# Patient Record
Sex: MALE | Race: WHITE | Employment: OTHER | URBAN - METROPOLITAN AREA
[De-identification: names, ages, dates, MRNs, and addresses within clinical notes are randomized per-mention and may not be internally consistent; named-entity substitution may affect disease eponyms.]

---

## 2021-01-01 ENCOUNTER — HOSPITAL ENCOUNTER (OUTPATIENT)
Dept: INFUSION THERAPY | Age: 76
Discharge: HOME | End: 2021-11-12
Payer: COMMERCIAL

## 2021-01-01 ENCOUNTER — TELEPHONE (OUTPATIENT)
Dept: ONCOLOGY | Age: 76
End: 2021-01-01

## 2021-01-01 ENCOUNTER — HOSPITAL ENCOUNTER (INPATIENT)
Age: 76
LOS: 1 days | DRG: 853 | End: 2021-12-14
Attending: EMERGENCY MEDICINE | Admitting: FAMILY MEDICINE
Payer: COMMERCIAL

## 2021-01-01 ENCOUNTER — APPOINTMENT (OUTPATIENT)
Dept: GENERAL RADIOLOGY | Age: 76
DRG: 853 | End: 2021-01-01
Payer: COMMERCIAL

## 2021-01-01 ENCOUNTER — HOSPITAL ENCOUNTER (OUTPATIENT)
Dept: MRI IMAGING | Age: 76
Discharge: HOME | End: 2021-06-10
Payer: COMMERCIAL

## 2021-01-01 ENCOUNTER — HOSPITAL ENCOUNTER (OUTPATIENT)
Dept: INFUSION THERAPY | Age: 76
Discharge: HOME | End: 2021-10-29
Payer: COMMERCIAL

## 2021-01-01 ENCOUNTER — OFFICE VISIT (OUTPATIENT)
Dept: ONCOLOGY | Age: 76
End: 2021-01-01
Payer: COMMERCIAL

## 2021-01-01 ENCOUNTER — HOSPITAL ENCOUNTER (OUTPATIENT)
Dept: RADIATION ONCOLOGY | Age: 76
Discharge: HOME | End: 2021-09-01
Payer: COMMERCIAL

## 2021-01-01 ENCOUNTER — TELEPHONE (OUTPATIENT)
Dept: INFUSION THERAPY | Age: 76
End: 2021-01-01

## 2021-01-01 ENCOUNTER — HOSPITAL ENCOUNTER (OUTPATIENT)
Dept: GENERAL RADIOLOGY | Age: 76
Discharge: HOME | End: 2021-08-17
Payer: COMMERCIAL

## 2021-01-01 ENCOUNTER — HOSPITAL ENCOUNTER (OUTPATIENT)
Dept: INFUSION THERAPY | Age: 76
Discharge: HOME | End: 2021-12-03
Payer: COMMERCIAL

## 2021-01-01 ENCOUNTER — OFFICE VISIT (OUTPATIENT)
Dept: SURGERY | Age: 76
End: 2021-01-01
Payer: COMMERCIAL

## 2021-01-01 ENCOUNTER — ANESTHESIA (OUTPATIENT)
Dept: OPERATING ROOM | Age: 76
DRG: 853 | End: 2021-01-01
Payer: COMMERCIAL

## 2021-01-01 ENCOUNTER — HOSPITAL ENCOUNTER (OUTPATIENT)
Dept: INFUSION THERAPY | Age: 76
Discharge: HOME | End: 2021-08-04
Payer: COMMERCIAL

## 2021-01-01 ENCOUNTER — CLINICAL DOCUMENTATION (OUTPATIENT)
Dept: INFUSION THERAPY | Age: 76
End: 2021-01-01

## 2021-01-01 ENCOUNTER — CLINICAL DOCUMENTATION (OUTPATIENT)
Dept: ONCOLOGY | Age: 76
End: 2021-01-01

## 2021-01-01 ENCOUNTER — HOSPITAL ENCOUNTER (OUTPATIENT)
Dept: INFUSION THERAPY | Age: 76
Discharge: HOME | End: 2021-12-07
Payer: COMMERCIAL

## 2021-01-01 ENCOUNTER — HOSPITAL ENCOUNTER (OUTPATIENT)
Dept: CT IMAGING | Age: 76
Discharge: HOME | End: 2021-08-17
Payer: COMMERCIAL

## 2021-01-01 ENCOUNTER — HOSPITAL ENCOUNTER (OUTPATIENT)
Dept: INFUSION THERAPY | Age: 76
Discharge: HOME | End: 2021-10-22
Payer: COMMERCIAL

## 2021-01-01 ENCOUNTER — APPOINTMENT (OUTPATIENT)
Dept: RADIATION ONCOLOGY | Age: 76
End: 2021-01-01
Attending: RADIOLOGY
Payer: COMMERCIAL

## 2021-01-01 ENCOUNTER — INITIAL CONSULT (OUTPATIENT)
Dept: ONCOLOGY | Age: 76
End: 2021-01-01
Payer: COMMERCIAL

## 2021-01-01 ENCOUNTER — HOSPITAL ENCOUNTER (INPATIENT)
Age: 76
LOS: 1 days | Discharge: HOME | DRG: 386 | End: 2021-11-29
Attending: STUDENT IN AN ORGANIZED HEALTH CARE EDUCATION/TRAINING PROGRAM | Admitting: INTERNAL MEDICINE
Payer: COMMERCIAL

## 2021-01-01 ENCOUNTER — HOSPITAL ENCOUNTER (OUTPATIENT)
Dept: INFUSION THERAPY | Age: 76
Discharge: HOME | End: 2021-10-01
Payer: COMMERCIAL

## 2021-01-01 ENCOUNTER — HOSPITAL ENCOUNTER (OUTPATIENT)
Dept: PET IMAGING | Age: 76
Discharge: HOME | End: 2021-08-02
Payer: COMMERCIAL

## 2021-01-01 ENCOUNTER — HOSPITAL ENCOUNTER (OUTPATIENT)
Dept: INFUSION THERAPY | Age: 76
Discharge: HOME | End: 2021-09-22
Payer: COMMERCIAL

## 2021-01-01 ENCOUNTER — HOSPITAL ENCOUNTER (OUTPATIENT)
Dept: INFUSION THERAPY | Age: 76
Discharge: HOME | End: 2021-09-17
Payer: COMMERCIAL

## 2021-01-01 ENCOUNTER — HOSPITAL ENCOUNTER (OUTPATIENT)
Age: 76
Setting detail: SPECIMEN
Discharge: HOME | End: 2021-05-12
Payer: COMMERCIAL

## 2021-01-01 ENCOUNTER — HOSPITAL ENCOUNTER (OUTPATIENT)
Dept: RADIATION ONCOLOGY | Age: 76
Discharge: HOME | End: 2021-09-15
Attending: RADIOLOGY
Payer: COMMERCIAL

## 2021-01-01 ENCOUNTER — ANESTHESIA EVENT (OUTPATIENT)
Dept: OPERATING ROOM | Age: 76
DRG: 853 | End: 2021-01-01
Payer: COMMERCIAL

## 2021-01-01 ENCOUNTER — HOSPITAL ENCOUNTER (OUTPATIENT)
Dept: RADIATION ONCOLOGY | Age: 76
Discharge: HOME | End: 2021-10-06
Attending: RADIOLOGY
Payer: COMMERCIAL

## 2021-01-01 ENCOUNTER — HOSPITAL ENCOUNTER (OUTPATIENT)
Dept: GENERAL RADIOLOGY | Age: 76
Discharge: HOME | End: 2021-12-03
Payer: COMMERCIAL

## 2021-01-01 ENCOUNTER — TELEPHONE (OUTPATIENT)
Dept: SURGERY | Age: 76
End: 2021-01-01

## 2021-01-01 ENCOUNTER — HOSPITAL ENCOUNTER (OUTPATIENT)
Dept: INFUSION THERAPY | Age: 76
Discharge: HOME | End: 2021-09-20
Payer: COMMERCIAL

## 2021-01-01 ENCOUNTER — APPOINTMENT (OUTPATIENT)
Dept: CT IMAGING | Age: 76
DRG: 386 | End: 2021-01-01
Payer: COMMERCIAL

## 2021-01-01 ENCOUNTER — HOSPITAL ENCOUNTER (OUTPATIENT)
Dept: INFUSION THERAPY | Age: 76
Discharge: HOME | End: 2021-12-01
Payer: COMMERCIAL

## 2021-01-01 ENCOUNTER — HOSPITAL ENCOUNTER (OUTPATIENT)
Dept: INFUSION THERAPY | Age: 76
Discharge: HOME | End: 2021-09-07
Payer: COMMERCIAL

## 2021-01-01 ENCOUNTER — HOSPITAL ENCOUNTER (OUTPATIENT)
Age: 76
Discharge: HOME | End: 2021-12-03
Payer: COMMERCIAL

## 2021-01-01 ENCOUNTER — APPOINTMENT (OUTPATIENT)
Dept: CT IMAGING | Age: 76
DRG: 853 | End: 2021-01-01
Payer: COMMERCIAL

## 2021-01-01 ENCOUNTER — APPOINTMENT (OUTPATIENT)
Dept: INFUSION THERAPY | Age: 76
End: 2021-01-01
Payer: COMMERCIAL

## 2021-01-01 ENCOUNTER — HOSPITAL ENCOUNTER (OUTPATIENT)
Dept: INFUSION THERAPY | Age: 76
Discharge: HOME | End: 2021-07-21
Payer: COMMERCIAL

## 2021-01-01 ENCOUNTER — HOSPITAL ENCOUNTER (OUTPATIENT)
Dept: RADIATION ONCOLOGY | Age: 76
Discharge: HOME | End: 2021-09-21
Attending: RADIOLOGY
Payer: COMMERCIAL

## 2021-01-01 ENCOUNTER — HOSPITAL ENCOUNTER (OUTPATIENT)
Dept: MRI IMAGING | Age: 76
Discharge: HOME | End: 2021-06-18
Payer: COMMERCIAL

## 2021-01-01 ENCOUNTER — NURSE ONLY (OUTPATIENT)
Dept: ONCOLOGY | Age: 76
End: 2021-01-01

## 2021-01-01 ENCOUNTER — HOSPITAL ENCOUNTER (OUTPATIENT)
Dept: INFUSION THERAPY | Age: 76
Discharge: HOME | End: 2021-08-20
Payer: COMMERCIAL

## 2021-01-01 ENCOUNTER — HOSPITAL ENCOUNTER (OUTPATIENT)
Dept: CT IMAGING | Age: 76
Discharge: HOME | End: 2021-06-10
Payer: COMMERCIAL

## 2021-01-01 ENCOUNTER — HOSPITAL ENCOUNTER (OUTPATIENT)
Dept: RADIATION ONCOLOGY | Age: 76
Discharge: HOME | End: 2021-10-08
Attending: RADIOLOGY
Payer: COMMERCIAL

## 2021-01-01 ENCOUNTER — HOSPITAL ENCOUNTER (OUTPATIENT)
Dept: RADIATION ONCOLOGY | Age: 76
Discharge: HOME | End: 2021-07-22
Payer: COMMERCIAL

## 2021-01-01 ENCOUNTER — HOSPITAL ENCOUNTER (OUTPATIENT)
Dept: INFUSION THERAPY | Age: 76
Discharge: HOME | End: 2021-11-01
Payer: COMMERCIAL

## 2021-01-01 ENCOUNTER — HOSPITAL ENCOUNTER (OUTPATIENT)
Dept: INTERVENTIONAL RADIOLOGY/VASCULAR | Age: 76
Discharge: HOME | End: 2021-08-10
Payer: COMMERCIAL

## 2021-01-01 ENCOUNTER — HOSPITAL ENCOUNTER (OUTPATIENT)
Dept: CT IMAGING | Age: 76
Discharge: HOME | End: 2021-05-25
Payer: COMMERCIAL

## 2021-01-01 ENCOUNTER — HOSPITAL ENCOUNTER (OUTPATIENT)
Dept: INFUSION THERAPY | Age: 76
Discharge: HOME | End: 2021-06-24
Payer: COMMERCIAL

## 2021-01-01 ENCOUNTER — HOSPITAL ENCOUNTER (OUTPATIENT)
Dept: RADIATION ONCOLOGY | Age: 76
Discharge: HOME | End: 2021-10-11
Attending: RADIOLOGY
Payer: COMMERCIAL

## 2021-01-01 ENCOUNTER — HOSPITAL ENCOUNTER (OUTPATIENT)
Dept: INFUSION THERAPY | Age: 76
Discharge: HOME | End: 2021-10-04
Payer: COMMERCIAL

## 2021-01-01 ENCOUNTER — CLINICAL DOCUMENTATION (OUTPATIENT)
Dept: RADIATION ONCOLOGY | Age: 76
End: 2021-01-01

## 2021-01-01 ENCOUNTER — HOSPITAL ENCOUNTER (OUTPATIENT)
Dept: RADIATION ONCOLOGY | Age: 76
Discharge: HOME | End: 2021-09-09
Attending: RADIOLOGY
Payer: COMMERCIAL

## 2021-01-01 ENCOUNTER — APPOINTMENT (OUTPATIENT)
Dept: GENERAL RADIOLOGY | Age: 76
DRG: 386 | End: 2021-01-01
Payer: COMMERCIAL

## 2021-01-01 ENCOUNTER — HOSPITAL ENCOUNTER (OUTPATIENT)
Dept: INFUSION THERAPY | Age: 76
Discharge: HOME | End: 2021-06-01
Payer: COMMERCIAL

## 2021-01-01 ENCOUNTER — HOSPITAL ENCOUNTER (OUTPATIENT)
Dept: INFUSION THERAPY | Age: 76
Discharge: HOME | End: 2021-11-03
Payer: COMMERCIAL

## 2021-01-01 VITALS
HEART RATE: 70 BPM | HEIGHT: 69 IN | WEIGHT: 195.4 LBS | BODY MASS INDEX: 28.94 KG/M2 | DIASTOLIC BLOOD PRESSURE: 58 MMHG | OXYGEN SATURATION: 98 % | SYSTOLIC BLOOD PRESSURE: 118 MMHG | RESPIRATION RATE: 16 BRPM | TEMPERATURE: 96.9 F

## 2021-01-01 VITALS
DIASTOLIC BLOOD PRESSURE: 54 MMHG | SYSTOLIC BLOOD PRESSURE: 90 MMHG | OXYGEN SATURATION: 94 % | OXYGEN SATURATION: 99 % | HEIGHT: 71 IN | WEIGHT: 200.8 LBS | DIASTOLIC BLOOD PRESSURE: 50 MMHG | HEART RATE: 64 BPM | BODY MASS INDEX: 28.11 KG/M2 | SYSTOLIC BLOOD PRESSURE: 98 MMHG | TEMPERATURE: 96.3 F | RESPIRATION RATE: 14 BRPM | TEMPERATURE: 95.4 F

## 2021-01-01 VITALS — HEIGHT: 70 IN | BODY MASS INDEX: 30.21 KG/M2 | WEIGHT: 211 LBS

## 2021-01-01 VITALS
HEIGHT: 63 IN | DIASTOLIC BLOOD PRESSURE: 51 MMHG | SYSTOLIC BLOOD PRESSURE: 91 MMHG | BODY MASS INDEX: 33.66 KG/M2 | TEMPERATURE: 96.7 F | RESPIRATION RATE: 12 BRPM | HEART RATE: 76 BPM | OXYGEN SATURATION: 98 % | WEIGHT: 190 LBS

## 2021-01-01 VITALS
TEMPERATURE: 97.3 F | DIASTOLIC BLOOD PRESSURE: 51 MMHG | HEART RATE: 73 BPM | OXYGEN SATURATION: 96 % | HEIGHT: 71 IN | BODY MASS INDEX: 28.53 KG/M2 | SYSTOLIC BLOOD PRESSURE: 86 MMHG | WEIGHT: 203.8 LBS | RESPIRATION RATE: 16 BRPM

## 2021-01-01 VITALS
BODY MASS INDEX: 27.3 KG/M2 | DIASTOLIC BLOOD PRESSURE: 62 MMHG | HEART RATE: 82 BPM | SYSTOLIC BLOOD PRESSURE: 104 MMHG | HEIGHT: 71 IN | RESPIRATION RATE: 16 BRPM | TEMPERATURE: 98.2 F | OXYGEN SATURATION: 99 % | WEIGHT: 195 LBS

## 2021-01-01 VITALS
HEART RATE: 69 BPM | HEIGHT: 71 IN | SYSTOLIC BLOOD PRESSURE: 134 MMHG | BODY MASS INDEX: 30.52 KG/M2 | RESPIRATION RATE: 16 BRPM | TEMPERATURE: 98.3 F | DIASTOLIC BLOOD PRESSURE: 66 MMHG | WEIGHT: 218 LBS | OXYGEN SATURATION: 96 %

## 2021-01-01 VITALS
DIASTOLIC BLOOD PRESSURE: 52 MMHG | HEIGHT: 70 IN | HEART RATE: 53 BPM | OXYGEN SATURATION: 99 % | SYSTOLIC BLOOD PRESSURE: 108 MMHG | WEIGHT: 197.3 LBS | BODY MASS INDEX: 28.24 KG/M2

## 2021-01-01 VITALS
HEIGHT: 71 IN | BODY MASS INDEX: 30.04 KG/M2 | DIASTOLIC BLOOD PRESSURE: 56 MMHG | SYSTOLIC BLOOD PRESSURE: 104 MMHG | OXYGEN SATURATION: 94 % | WEIGHT: 214.6 LBS | TEMPERATURE: 97 F | RESPIRATION RATE: 16 BRPM | HEART RATE: 72 BPM

## 2021-01-01 VITALS
BODY MASS INDEX: 31.87 KG/M2 | WEIGHT: 215.83 LBS | DIASTOLIC BLOOD PRESSURE: 45 MMHG | SYSTOLIC BLOOD PRESSURE: 98 MMHG | OXYGEN SATURATION: 92 % | HEART RATE: 25 BPM | TEMPERATURE: 97.6 F

## 2021-01-01 VITALS
DIASTOLIC BLOOD PRESSURE: 66 MMHG | OXYGEN SATURATION: 99 % | RESPIRATION RATE: 16 BRPM | SYSTOLIC BLOOD PRESSURE: 94 MMHG | BODY MASS INDEX: 29.96 KG/M2 | HEIGHT: 71 IN | WEIGHT: 214 LBS | HEART RATE: 86 BPM | TEMPERATURE: 96.9 F

## 2021-01-01 VITALS
WEIGHT: 195 LBS | RESPIRATION RATE: 16 BRPM | SYSTOLIC BLOOD PRESSURE: 118 MMHG | OXYGEN SATURATION: 98 % | BODY MASS INDEX: 28.88 KG/M2 | DIASTOLIC BLOOD PRESSURE: 58 MMHG | HEART RATE: 70 BPM | TEMPERATURE: 96.9 F | HEIGHT: 69 IN

## 2021-01-01 VITALS
TEMPERATURE: 97.3 F | BODY MASS INDEX: 30.3 KG/M2 | OXYGEN SATURATION: 98 % | WEIGHT: 216.4 LBS | DIASTOLIC BLOOD PRESSURE: 61 MMHG | SYSTOLIC BLOOD PRESSURE: 111 MMHG | HEART RATE: 64 BPM | HEIGHT: 71 IN

## 2021-01-01 VITALS
TEMPERATURE: 96.9 F | BODY MASS INDEX: 30.97 KG/M2 | OXYGEN SATURATION: 97 % | DIASTOLIC BLOOD PRESSURE: 61 MMHG | RESPIRATION RATE: 16 BRPM | HEART RATE: 91 BPM | SYSTOLIC BLOOD PRESSURE: 112 MMHG | HEIGHT: 71 IN | WEIGHT: 221.2 LBS

## 2021-01-01 VITALS
HEART RATE: 68 BPM | OXYGEN SATURATION: 94 % | DIASTOLIC BLOOD PRESSURE: 52 MMHG | TEMPERATURE: 96.1 F | WEIGHT: 194.6 LBS | BODY MASS INDEX: 27.86 KG/M2 | HEIGHT: 70 IN | SYSTOLIC BLOOD PRESSURE: 98 MMHG | RESPIRATION RATE: 16 BRPM

## 2021-01-01 VITALS
SYSTOLIC BLOOD PRESSURE: 113 MMHG | HEART RATE: 83 BPM | WEIGHT: 181.34 LBS | BODY MASS INDEX: 25.96 KG/M2 | OXYGEN SATURATION: 98 % | RESPIRATION RATE: 16 BRPM | TEMPERATURE: 96.3 F | DIASTOLIC BLOOD PRESSURE: 67 MMHG | HEIGHT: 70 IN

## 2021-01-01 VITALS
DIASTOLIC BLOOD PRESSURE: 55 MMHG | RESPIRATION RATE: 16 BRPM | BODY MASS INDEX: 28.53 KG/M2 | HEART RATE: 61 BPM | OXYGEN SATURATION: 96 % | TEMPERATURE: 97.3 F | WEIGHT: 203.8 LBS | HEIGHT: 71 IN | SYSTOLIC BLOOD PRESSURE: 108 MMHG

## 2021-01-01 VITALS
HEART RATE: 61 BPM | RESPIRATION RATE: 20 BRPM | SYSTOLIC BLOOD PRESSURE: 181 MMHG | OXYGEN SATURATION: 97 % | WEIGHT: 218 LBS | BODY MASS INDEX: 30.52 KG/M2 | TEMPERATURE: 96.8 F | HEIGHT: 71 IN | DIASTOLIC BLOOD PRESSURE: 75 MMHG

## 2021-01-01 VITALS
WEIGHT: 217.8 LBS | TEMPERATURE: 96.1 F | RESPIRATION RATE: 16 BRPM | HEART RATE: 90 BPM | OXYGEN SATURATION: 95 % | BODY MASS INDEX: 30.49 KG/M2 | DIASTOLIC BLOOD PRESSURE: 57 MMHG | SYSTOLIC BLOOD PRESSURE: 100 MMHG | HEIGHT: 71 IN

## 2021-01-01 VITALS
DIASTOLIC BLOOD PRESSURE: 52 MMHG | HEIGHT: 69 IN | BODY MASS INDEX: 27.37 KG/M2 | OXYGEN SATURATION: 96 % | TEMPERATURE: 97.8 F | WEIGHT: 184.8 LBS | HEART RATE: 79 BPM | SYSTOLIC BLOOD PRESSURE: 109 MMHG

## 2021-01-01 VITALS
HEART RATE: 70 BPM | OXYGEN SATURATION: 98 % | WEIGHT: 186.31 LBS | HEIGHT: 63 IN | DIASTOLIC BLOOD PRESSURE: 64 MMHG | SYSTOLIC BLOOD PRESSURE: 112 MMHG | BODY MASS INDEX: 33.01 KG/M2

## 2021-01-01 VITALS
DIASTOLIC BLOOD PRESSURE: 57 MMHG | HEIGHT: 69 IN | TEMPERATURE: 96.6 F | BODY MASS INDEX: 27.4 KG/M2 | SYSTOLIC BLOOD PRESSURE: 124 MMHG | HEART RATE: 89 BPM | RESPIRATION RATE: 18 BRPM | WEIGHT: 185 LBS

## 2021-01-01 DIAGNOSIS — C34.12 PRIMARY ADENOCARCINOMA OF UPPER LOBE OF LEFT LUNG (HCC): Primary | ICD-10-CM

## 2021-01-01 DIAGNOSIS — C20 RECTAL CANCER (HCC): ICD-10-CM

## 2021-01-01 DIAGNOSIS — C34.12 PRIMARY ADENOCARCINOMA OF UPPER LOBE OF LEFT LUNG (HCC): ICD-10-CM

## 2021-01-01 DIAGNOSIS — C20 MALIGNANT NEOPLASM OF RECTUM (HCC): ICD-10-CM

## 2021-01-01 DIAGNOSIS — R10.9 ABDOMINAL PAIN, UNSPECIFIED ABDOMINAL LOCATION: ICD-10-CM

## 2021-01-01 DIAGNOSIS — C20 RECTAL CANCER (HCC): Primary | ICD-10-CM

## 2021-01-01 DIAGNOSIS — C80.1 CANCER (HCC): ICD-10-CM

## 2021-01-01 DIAGNOSIS — Z71.89 ADVANCE DIRECTIVE DISCUSSED WITH PATIENT: Primary | ICD-10-CM

## 2021-01-01 DIAGNOSIS — D64.9 ANEMIA, UNSPECIFIED TYPE: ICD-10-CM

## 2021-01-01 DIAGNOSIS — K63.1 BOWEL PERFORATION (HCC): Primary | ICD-10-CM

## 2021-01-01 DIAGNOSIS — A09 DIARRHEA OF INFECTIOUS ORIGIN: Primary | ICD-10-CM

## 2021-01-01 DIAGNOSIS — C20 MALIGNANT NEOPLASM OF RECTUM (HCC): Primary | ICD-10-CM

## 2021-01-01 DIAGNOSIS — E87.20 LACTIC ACIDOSIS: ICD-10-CM

## 2021-01-01 DIAGNOSIS — K59.1 FUNCTIONAL DIARRHEA: ICD-10-CM

## 2021-01-01 DIAGNOSIS — R91.1 LESION OF LUNG: ICD-10-CM

## 2021-01-01 DIAGNOSIS — E87.6 HYPOKALEMIA: ICD-10-CM

## 2021-01-01 DIAGNOSIS — K52.9 COLITIS: Primary | ICD-10-CM

## 2021-01-01 DIAGNOSIS — R91.1 LUNG NODULE: ICD-10-CM

## 2021-01-01 DIAGNOSIS — K52.9 COLITIS: ICD-10-CM

## 2021-01-01 DIAGNOSIS — R60.0 EDEMA OF LEFT LOWER EXTREMITY: ICD-10-CM

## 2021-01-01 LAB
ABO/RH: NORMAL
ADENOVIRUS F 40 41 PCR: NOT DETECTED
ALBUMIN SERPL-MCNC: 2.4 GM/DL (ref 3.4–5)
ALBUMIN SERPL-MCNC: 2.8 GM/DL (ref 3.4–5)
ALBUMIN SERPL-MCNC: 3.1 GM/DL (ref 3.4–5)
ALBUMIN SERPL-MCNC: 3.4 GM/DL (ref 3.4–5)
ALBUMIN SERPL-MCNC: 3.6 GM/DL (ref 3.4–5)
ALBUMIN SERPL-MCNC: 3.7 GM/DL (ref 3.4–5)
ALBUMIN SERPL-MCNC: 3.7 GM/DL (ref 3.4–5)
ALBUMIN SERPL-MCNC: 4 GM/DL (ref 3.4–5)
ALP BLD-CCNC: 103 IU/L (ref 40–129)
ALP BLD-CCNC: 50 IU/L (ref 40–129)
ALP BLD-CCNC: 70 IU/L (ref 40–129)
ALP BLD-CCNC: 72 IU/L (ref 40–129)
ALP BLD-CCNC: 75 IU/L (ref 40–128)
ALP BLD-CCNC: 75 IU/L (ref 40–128)
ALP BLD-CCNC: 76 IU/L (ref 40–129)
ALP BLD-CCNC: 78 IU/L (ref 40–129)
ALP BLD-CCNC: 95 IU/L (ref 40–129)
ALP BLD-CCNC: 97 IU/L (ref 40–129)
ALT SERPL-CCNC: 11 U/L (ref 10–40)
ALT SERPL-CCNC: 15 U/L (ref 10–40)
ALT SERPL-CCNC: 5 U/L (ref 10–40)
ALT SERPL-CCNC: 6 U/L (ref 10–40)
ALT SERPL-CCNC: 6 U/L (ref 10–40)
ALT SERPL-CCNC: 7 U/L (ref 10–40)
ALT SERPL-CCNC: 8 U/L (ref 10–40)
ALT SERPL-CCNC: 8 U/L (ref 10–40)
ANION GAP SERPL CALCULATED.3IONS-SCNC: 10 MMOL/L (ref 4–16)
ANION GAP SERPL CALCULATED.3IONS-SCNC: 11 MMOL/L (ref 4–16)
ANION GAP SERPL CALCULATED.3IONS-SCNC: 12 MMOL/L (ref 4–16)
ANION GAP SERPL CALCULATED.3IONS-SCNC: 13 MMOL/L (ref 4–16)
ANION GAP SERPL CALCULATED.3IONS-SCNC: 14 MMOL/L (ref 4–16)
ANION GAP SERPL CALCULATED.3IONS-SCNC: 2 MMOL/L (ref 4–16)
ANION GAP SERPL CALCULATED.3IONS-SCNC: 7 MMOL/L (ref 4–16)
ANION GAP SERPL CALCULATED.3IONS-SCNC: 8 MMOL/L (ref 4–16)
ANION GAP SERPL CALCULATED.3IONS-SCNC: 9 MMOL/L (ref 4–16)
ANISOCYTOSIS: ABNORMAL
ANTIBODY SCREEN: NEGATIVE
APTT: 31.2 SECONDS (ref 25.1–37.1)
AST SERPL-CCNC: 11 IU/L (ref 15–37)
AST SERPL-CCNC: 14 IU/L (ref 15–37)
AST SERPL-CCNC: 14 IU/L (ref 15–37)
AST SERPL-CCNC: 15 IU/L (ref 15–37)
AST SERPL-CCNC: 15 IU/L (ref 15–37)
AST SERPL-CCNC: 16 IU/L (ref 15–37)
AST SERPL-CCNC: 21 IU/L (ref 15–37)
AST SERPL-CCNC: 21 IU/L (ref 15–37)
AST SERPL-CCNC: 23 IU/L (ref 15–37)
AST SERPL-CCNC: 27 IU/L (ref 15–37)
ASTROVIRUS PCR: NOT DETECTED
BACTERIA: NEGATIVE /HPF
BANDED NEUTROPHILS ABSOLUTE COUNT: 0.23 K/CU MM
BANDED NEUTROPHILS ABSOLUTE COUNT: 1.91 K/CU MM
BANDED NEUTROPHILS ABSOLUTE COUNT: 2.12 K/CU MM
BANDED NEUTROPHILS ABSOLUTE COUNT: 4.93 K/CU MM
BANDED NEUTROPHILS RELATIVE PERCENT: 10 % (ref 5–11)
BANDED NEUTROPHILS RELATIVE PERCENT: 22 % (ref 5–11)
BANDED NEUTROPHILS RELATIVE PERCENT: 23 % (ref 5–11)
BANDED NEUTROPHILS RELATIVE PERCENT: 61 % (ref 5–11)
BASOPHILS ABSOLUTE: 0 K/CU MM
BASOPHILS ABSOLUTE: 0.1 K/CU MM
BASOPHILS RELATIVE PERCENT: 0.5 % (ref 0–1)
BASOPHILS RELATIVE PERCENT: 0.5 % (ref 0–1)
BASOPHILS RELATIVE PERCENT: 0.6 % (ref 0–1)
BASOPHILS RELATIVE PERCENT: 0.8 % (ref 0–1)
BASOPHILS RELATIVE PERCENT: 0.9 % (ref 0–1)
BASOPHILS RELATIVE PERCENT: 0.9 % (ref 0–1)
BASOPHILS RELATIVE PERCENT: 1 % (ref 0–1)
BASOPHILS RELATIVE PERCENT: 1.1 % (ref 0–1)
BILIRUB SERPL-MCNC: 0.4 MG/DL (ref 0–1)
BILIRUB SERPL-MCNC: 0.5 MG/DL (ref 0–1)
BILIRUB SERPL-MCNC: 0.6 MG/DL (ref 0–1)
BILIRUB SERPL-MCNC: 0.6 MG/DL (ref 0–1)
BILIRUB SERPL-MCNC: 0.8 MG/DL (ref 0–1)
BILIRUBIN URINE: NEGATIVE MG/DL
BLOOD, URINE: NEGATIVE
BUN BLDV-MCNC: 14 MG/DL (ref 6–23)
BUN BLDV-MCNC: 17 MG/DL (ref 6–23)
BUN BLDV-MCNC: 19 MG/DL (ref 6–23)
BUN BLDV-MCNC: 20 MG/DL (ref 6–23)
BUN BLDV-MCNC: 22 MG/DL (ref 6–23)
BUN BLDV-MCNC: 22 MG/DL (ref 6–23)
BUN BLDV-MCNC: 23 MG/DL (ref 6–23)
BUN BLDV-MCNC: 26 MG/DL (ref 6–23)
BUN BLDV-MCNC: 28 MG/DL (ref 6–23)
BUN BLDV-MCNC: 33 MG/DL (ref 6–23)
BUN BLDV-MCNC: 48 MG/DL (ref 6–23)
BURR CELLS: ABNORMAL
CALCIUM IONIZED: 4.28 MG/DL (ref 4.48–5.28)
CALCIUM SERPL-MCNC: 7.4 MG/DL (ref 8.3–10.6)
CALCIUM SERPL-MCNC: 8.2 MG/DL (ref 8.3–10.6)
CALCIUM SERPL-MCNC: 8.2 MG/DL (ref 8.3–10.6)
CALCIUM SERPL-MCNC: 8.4 MG/DL (ref 8.3–10.6)
CALCIUM SERPL-MCNC: 8.4 MG/DL (ref 8.3–10.6)
CALCIUM SERPL-MCNC: 8.5 MG/DL (ref 8.3–10.6)
CALCIUM SERPL-MCNC: 8.6 MG/DL (ref 8.3–10.6)
CALCIUM SERPL-MCNC: 8.6 MG/DL (ref 8.3–10.6)
CALCIUM SERPL-MCNC: 8.7 MG/DL (ref 8.3–10.6)
CALCIUM SERPL-MCNC: 8.9 MG/DL (ref 8.3–10.6)
CALCIUM SERPL-MCNC: 8.9 MG/DL (ref 8.3–10.6)
CAMPYLOBACTER PCR: NOT DETECTED
CEA: 3.5 NG/ML
CEA: 4.2 NG/ML
CHLORIDE BLD-SCNC: 100 MMOL/L (ref 99–110)
CHLORIDE BLD-SCNC: 101 MMOL/L (ref 99–110)
CHLORIDE BLD-SCNC: 102 MMOL/L (ref 99–110)
CHLORIDE BLD-SCNC: 103 MMOL/L (ref 99–110)
CHLORIDE BLD-SCNC: 103 MMOL/L (ref 99–110)
CHLORIDE BLD-SCNC: 104 MMOL/L (ref 99–110)
CHLORIDE BLD-SCNC: 105 MMOL/L (ref 99–110)
CHLORIDE BLD-SCNC: 105 MMOL/L (ref 99–110)
CHLORIDE BLD-SCNC: 106 MMOL/L (ref 99–110)
CHLORIDE BLD-SCNC: 98 MMOL/L (ref 99–110)
CHLORIDE BLD-SCNC: 99 MMOL/L (ref 99–110)
CLARITY: CLEAR
CLOSTRIDIUM DIFFICILE, PCR: NORMAL
CO2: 14 MMOL/L (ref 21–32)
CO2: 23 MMOL/L (ref 21–32)
CO2: 23 MMOL/L (ref 21–32)
CO2: 25 MMOL/L (ref 21–32)
CO2: 25 MMOL/L (ref 21–32)
CO2: 26 MMOL/L (ref 21–32)
CO2: 26 MMOL/L (ref 21–32)
CO2: 27 MMOL/L (ref 21–32)
CO2: 28 MMOL/L (ref 21–32)
CO2: 28 MMOL/L (ref 21–32)
CO2: 30 MMOL/L (ref 21–32)
COLOR: YELLOW
CREAT SERPL-MCNC: 0.8 MG/DL (ref 0.9–1.3)
CREAT SERPL-MCNC: 0.8 MG/DL (ref 0.9–1.3)
CREAT SERPL-MCNC: 0.9 MG/DL (ref 0.9–1.3)
CREAT SERPL-MCNC: 0.9 MG/DL (ref 0.9–1.3)
CREAT SERPL-MCNC: 1 MG/DL (ref 0.9–1.3)
CREAT SERPL-MCNC: 1.1 MG/DL (ref 0.9–1.3)
CREAT SERPL-MCNC: 1.1 MG/DL (ref 0.9–1.3)
CREAT SERPL-MCNC: 1.2 MG/DL (ref 0.9–1.3)
CREAT SERPL-MCNC: 1.3 MG/DL (ref 0.9–1.3)
CREAT SERPL-MCNC: 1.4 MG/DL (ref 0.9–1.3)
CREAT SERPL-MCNC: 1.4 MG/DL (ref 0.9–1.3)
CRYPTOSPORIDIUM PCR: NOT DETECTED
CYCLOSPORA CAYETANENSIS PCR: NOT DETECTED
DIFFERENTIAL TYPE: ABNORMAL
DOHLE BODIES: PRESENT
DOHLE BODIES: PRESENT
E COLI 0157 PCR: NOT DETECTED
E COLI ENTEROAGGREGATIVE PCR: NOT DETECTED
E COLI ENTEROPATHOGENIC PCR: NOT DETECTED
E COLI ENTEROTOXIGENIC PCR: NOT DETECTED
E COLI SHIGA LIKE TOXIN PCR: NOT DETECTED
E COLI SHIGELLA/ENTEROINVASIVE PCR: NOT DETECTED
ENTAMOEBA HISTOLYTICA PCR: NOT DETECTED
EOSINOPHILS ABSOLUTE: 0.1 K/CU MM
EOSINOPHILS ABSOLUTE: 0.2 K/CU MM
EOSINOPHILS RELATIVE PERCENT: 1 % (ref 0–3)
EOSINOPHILS RELATIVE PERCENT: 2.9 % (ref 0–3)
EOSINOPHILS RELATIVE PERCENT: 3 % (ref 0–3)
EOSINOPHILS RELATIVE PERCENT: 3.4 % (ref 0–3)
EOSINOPHILS RELATIVE PERCENT: 3.6 % (ref 0–3)
EOSINOPHILS RELATIVE PERCENT: 3.6 % (ref 0–3)
EOSINOPHILS RELATIVE PERCENT: 3.8 % (ref 0–3)
EOSINOPHILS RELATIVE PERCENT: 4.4 % (ref 0–3)
EOSINOPHILS RELATIVE PERCENT: 4.5 % (ref 0–3)
FERRITIN: 145 NG/ML (ref 30–400)
FERRITIN: 186 NG/ML (ref 30–400)
FERRITIN: 195 NG/ML (ref 30–400)
FERRITIN: 301 NG/ML (ref 30–400)
FOLATE: 4.2 NG/ML (ref 3.1–17.5)
GFR AFRICAN AMERICAN: 53 ML/MIN/1.73M2
GFR AFRICAN AMERICAN: 53 ML/MIN/1.73M2
GFR AFRICAN AMERICAN: 59 ML/MIN/1.73M2
GFR AFRICAN AMERICAN: 59 ML/MIN/1.73M2
GFR AFRICAN AMERICAN: >60 ML/MIN/1.73M2
GFR NON-AFRICAN AMERICAN: 44 ML/MIN/1.73M2
GFR NON-AFRICAN AMERICAN: 44 ML/MIN/1.73M2
GFR NON-AFRICAN AMERICAN: 49 ML/MIN/1.73M2
GFR NON-AFRICAN AMERICAN: 49 ML/MIN/1.73M2
GFR NON-AFRICAN AMERICAN: 54 ML/MIN/1.73M2
GFR NON-AFRICAN AMERICAN: 59 ML/MIN/1.73M2
GFR NON-AFRICAN AMERICAN: >60 ML/MIN/1.73M2
GIARDIA LAMBLIA PCR: NOT DETECTED
GLUCOSE BLD-MCNC: 100 MG/DL (ref 70–99)
GLUCOSE BLD-MCNC: 101 MG/DL (ref 70–99)
GLUCOSE BLD-MCNC: 102 MG/DL (ref 70–99)
GLUCOSE BLD-MCNC: 102 MG/DL (ref 70–99)
GLUCOSE BLD-MCNC: 106 MG/DL (ref 70–99)
GLUCOSE BLD-MCNC: 113 MG/DL (ref 70–99)
GLUCOSE BLD-MCNC: 117 MG/DL (ref 70–99)
GLUCOSE BLD-MCNC: 161 MG/DL (ref 70–99)
GLUCOSE BLD-MCNC: 76 MG/DL (ref 70–99)
GLUCOSE BLD-MCNC: 83 MG/DL (ref 70–99)
GLUCOSE BLD-MCNC: 84 MG/DL (ref 70–99)
GLUCOSE BLD-MCNC: 86 MG/DL (ref 70–99)
GLUCOSE, URINE: NEGATIVE MG/DL
HCT VFR BLD CALC: 30.9 % (ref 42–52)
HCT VFR BLD CALC: 32.3 % (ref 42–52)
HCT VFR BLD CALC: 32.7 % (ref 42–52)
HCT VFR BLD CALC: 33.6 % (ref 42–52)
HCT VFR BLD CALC: 33.6 % (ref 42–52)
HCT VFR BLD CALC: 34.3 % (ref 42–52)
HCT VFR BLD CALC: 35.3 % (ref 42–52)
HCT VFR BLD CALC: 36.2 % (ref 42–52)
HCT VFR BLD CALC: 36.3 % (ref 42–52)
HCT VFR BLD CALC: 36.5 % (ref 42–52)
HCT VFR BLD CALC: 37.8 % (ref 42–52)
HCT VFR BLD CALC: 39.7 % (ref 42–52)
HCT VFR BLD CALC: 41.6 % (ref 42–52)
HEMOGLOBIN: 10.1 GM/DL (ref 13.5–18)
HEMOGLOBIN: 10.5 GM/DL (ref 13.5–18)
HEMOGLOBIN: 10.5 GM/DL (ref 13.5–18)
HEMOGLOBIN: 10.7 GM/DL (ref 13.5–18)
HEMOGLOBIN: 10.9 GM/DL (ref 13.5–18)
HEMOGLOBIN: 11.1 GM/DL (ref 13.5–18)
HEMOGLOBIN: 11.1 GM/DL (ref 13.5–18)
HEMOGLOBIN: 11.2 GM/DL (ref 13.5–18)
HEMOGLOBIN: 11.9 GM/DL (ref 13.5–18)
HEMOGLOBIN: 12 GM/DL (ref 13.5–18)
HEMOGLOBIN: 13.5 GM/DL (ref 13.5–18)
HEMOGLOBIN: 9.8 GM/DL (ref 13.5–18)
HEMOGLOBIN: 9.9 GM/DL (ref 13.5–18)
IMMATURE NEUTROPHIL %: 1.9 % (ref 0–0.43)
INR BLD: 1.07 INDEX
IONIZED CA: 1.07 MMOL/L (ref 1.12–1.32)
IRON: 51 UG/DL (ref 59–158)
IRON: 71 UG/DL (ref 59–158)
IRON: 76 UG/DL (ref 59–158)
KETONES, URINE: NEGATIVE MG/DL
LACTATE: 1.3 MMOL/L (ref 0.4–2)
LACTATE: 4.1 MMOL/L (ref 0.4–2)
LACTATE: 8.2 MMOL/L (ref 0.4–2)
LEUKOCYTE ESTERASE, URINE: NEGATIVE
LIPASE: 11 IU/L (ref 13–60)
LIPASE: 13 IU/L (ref 13–60)
LYMPHOCYTES ABSOLUTE: 0.7 K/CU MM
LYMPHOCYTES ABSOLUTE: 0.8 K/CU MM
LYMPHOCYTES ABSOLUTE: 0.9 K/CU MM
LYMPHOCYTES ABSOLUTE: 1.1 K/CU MM
LYMPHOCYTES ABSOLUTE: 1.3 K/CU MM
LYMPHOCYTES ABSOLUTE: 1.3 K/CU MM
LYMPHOCYTES ABSOLUTE: 1.4 K/CU MM
LYMPHOCYTES ABSOLUTE: 1.5 K/CU MM
LYMPHOCYTES ABSOLUTE: 1.5 K/CU MM
LYMPHOCYTES ABSOLUTE: 1.8 K/CU MM
LYMPHOCYTES RELATIVE PERCENT: 16 % (ref 24–44)
LYMPHOCYTES RELATIVE PERCENT: 16.2 % (ref 24–44)
LYMPHOCYTES RELATIVE PERCENT: 16.9 % (ref 24–44)
LYMPHOCYTES RELATIVE PERCENT: 23.2 % (ref 24–44)
LYMPHOCYTES RELATIVE PERCENT: 25.8 % (ref 24–44)
LYMPHOCYTES RELATIVE PERCENT: 30 % (ref 24–44)
LYMPHOCYTES RELATIVE PERCENT: 31.9 % (ref 24–44)
LYMPHOCYTES RELATIVE PERCENT: 32.3 % (ref 24–44)
LYMPHOCYTES RELATIVE PERCENT: 50.4 % (ref 24–44)
LYMPHOCYTES RELATIVE PERCENT: 61.4 % (ref 24–44)
LYMPHOCYTES RELATIVE PERCENT: 9 % (ref 24–44)
LYMPHOCYTES RELATIVE PERCENT: 9 % (ref 24–44)
MACROCYTES: ABNORMAL
MACROCYTES: ABNORMAL
MAGNESIUM: 1.6 MG/DL (ref 1.8–2.4)
MAGNESIUM: 1.8 MG/DL (ref 1.8–2.4)
MAGNESIUM: 1.9 MG/DL (ref 1.8–2.4)
MAGNESIUM: 2 MG/DL (ref 1.8–2.4)
MAGNESIUM: 2 MG/DL (ref 1.8–2.4)
MAGNESIUM: 2.1 MG/DL (ref 1.8–2.4)
MAGNESIUM: 2.1 MG/DL (ref 1.8–2.4)
MCH RBC QN AUTO: 31 PG (ref 27–31)
MCH RBC QN AUTO: 31.1 PG (ref 27–31)
MCH RBC QN AUTO: 31.2 PG (ref 27–31)
MCH RBC QN AUTO: 31.6 PG (ref 27–31)
MCH RBC QN AUTO: 31.7 PG (ref 27–31)
MCH RBC QN AUTO: 32 PG (ref 27–31)
MCH RBC QN AUTO: 32 PG (ref 27–31)
MCH RBC QN AUTO: 32.3 PG (ref 27–31)
MCH RBC QN AUTO: 32.7 PG (ref 27–31)
MCHC RBC AUTO-ENTMCNC: 29.5 % (ref 32–36)
MCHC RBC AUTO-ENTMCNC: 30.1 % (ref 32–36)
MCHC RBC AUTO-ENTMCNC: 30.2 % (ref 32–36)
MCHC RBC AUTO-ENTMCNC: 30.4 % (ref 32–36)
MCHC RBC AUTO-ENTMCNC: 30.7 % (ref 32–36)
MCHC RBC AUTO-ENTMCNC: 30.9 % (ref 32–36)
MCHC RBC AUTO-ENTMCNC: 31.3 % (ref 32–36)
MCHC RBC AUTO-ENTMCNC: 31.3 % (ref 32–36)
MCHC RBC AUTO-ENTMCNC: 31.5 % (ref 32–36)
MCHC RBC AUTO-ENTMCNC: 31.7 % (ref 32–36)
MCHC RBC AUTO-ENTMCNC: 31.7 % (ref 32–36)
MCHC RBC AUTO-ENTMCNC: 32.4 % (ref 32–36)
MCHC RBC AUTO-ENTMCNC: 32.5 % (ref 32–36)
MCV RBC AUTO: 100 FL (ref 78–100)
MCV RBC AUTO: 100.3 FL (ref 78–100)
MCV RBC AUTO: 102.4 FL (ref 78–100)
MCV RBC AUTO: 103.2 FL (ref 78–100)
MCV RBC AUTO: 103.4 FL (ref 78–100)
MCV RBC AUTO: 103.5 FL (ref 78–100)
MCV RBC AUTO: 103.7 FL (ref 78–100)
MCV RBC AUTO: 106.1 FL (ref 78–100)
MCV RBC AUTO: 107 FL (ref 78–100)
MCV RBC AUTO: 111 FL (ref 78–100)
MCV RBC AUTO: 95.6 FL (ref 78–100)
MCV RBC AUTO: 97.7 FL (ref 78–100)
MCV RBC AUTO: 98.1 FL (ref 78–100)
METAMYELOCYTES ABSOLUTE COUNT: 0.09 K/CU MM
METAMYELOCYTES ABSOLUTE COUNT: 0.17 K/CU MM
METAMYELOCYTES ABSOLUTE COUNT: 0.57 K/CU MM
METAMYELOCYTES PERCENT: 1 %
METAMYELOCYTES PERCENT: 2 %
METAMYELOCYTES PERCENT: 7 %
MONOCYTES ABSOLUTE: 0.1 K/CU MM
MONOCYTES ABSOLUTE: 0.2 K/CU MM
MONOCYTES ABSOLUTE: 0.3 K/CU MM
MONOCYTES ABSOLUTE: 0.4 K/CU MM
MONOCYTES ABSOLUTE: 0.4 K/CU MM
MONOCYTES ABSOLUTE: 0.5 K/CU MM
MONOCYTES ABSOLUTE: 0.6 K/CU MM
MONOCYTES ABSOLUTE: 0.7 K/CU MM
MONOCYTES ABSOLUTE: 0.9 K/CU MM
MONOCYTES ABSOLUTE: 1.2 K/CU MM
MONOCYTES RELATIVE PERCENT: 10 % (ref 0–4)
MONOCYTES RELATIVE PERCENT: 10.5 % (ref 0–4)
MONOCYTES RELATIVE PERCENT: 10.6 % (ref 0–4)
MONOCYTES RELATIVE PERCENT: 10.9 % (ref 0–4)
MONOCYTES RELATIVE PERCENT: 11.8 % (ref 0–4)
MONOCYTES RELATIVE PERCENT: 13 % (ref 0–4)
MONOCYTES RELATIVE PERCENT: 15 % (ref 0–4)
MONOCYTES RELATIVE PERCENT: 5 % (ref 0–4)
MONOCYTES RELATIVE PERCENT: 6 % (ref 0–4)
MONOCYTES RELATIVE PERCENT: 7.9 % (ref 0–4)
MONOCYTES RELATIVE PERCENT: 9 % (ref 0–4)
MONOCYTES RELATIVE PERCENT: 9.4 % (ref 0–4)
NITRITE URINE, QUANTITATIVE: NEGATIVE
NOROVIRUS GI GII PCR: NOT DETECTED
NUCLEATED RBC %: 0 %
NUCLEATED RBC %: 0 %
NUCLEATED RED BLOOD CELLS: 5
OVALOCYTES: ABNORMAL
PCT TRANSFERRIN: 27 % (ref 10–44)
PCT TRANSFERRIN: 30 % (ref 10–44)
PCT TRANSFERRIN: 39 % (ref 10–44)
PDW BLD-RTO: 13.5 % (ref 11.7–14.9)
PDW BLD-RTO: 13.7 % (ref 11.7–14.9)
PDW BLD-RTO: 14 % (ref 11.7–14.9)
PDW BLD-RTO: 14.3 % (ref 11.7–14.9)
PDW BLD-RTO: 14.3 % (ref 11.7–14.9)
PDW BLD-RTO: 14.6 % (ref 11.7–14.9)
PDW BLD-RTO: 14.9 % (ref 11.7–14.9)
PDW BLD-RTO: 15.4 % (ref 11.7–14.9)
PDW BLD-RTO: 15.5 % (ref 11.7–14.9)
PDW BLD-RTO: 15.9 % (ref 11.7–14.9)
PDW BLD-RTO: 16.2 % (ref 11.7–14.9)
PDW BLD-RTO: 16.6 % (ref 11.7–14.9)
PDW BLD-RTO: 17.2 % (ref 11.7–14.9)
PH, URINE: 5 (ref 5–8)
PHOSPHORUS: 3.7 MG/DL (ref 2.5–4.9)
PLATELET # BLD: 116 K/CU MM (ref 140–440)
PLATELET # BLD: 118 K/CU MM (ref 140–440)
PLATELET # BLD: 132 K/CU MM (ref 140–440)
PLATELET # BLD: 135 K/CU MM (ref 140–440)
PLATELET # BLD: 140 K/CU MM (ref 140–440)
PLATELET # BLD: 142 K/CU MM (ref 140–440)
PLATELET # BLD: 146 K/CU MM (ref 140–440)
PLATELET # BLD: 158 K/CU MM (ref 140–440)
PLATELET # BLD: 165 K/CU MM (ref 140–440)
PLATELET # BLD: 189 K/CU MM (ref 140–440)
PLATELET # BLD: 223 K/CU MM (ref 140–440)
PLATELET # BLD: 242 K/CU MM (ref 140–440)
PLATELET # BLD: 247 K/CU MM (ref 140–440)
PLESIOMONAS SHIGELLOIDES PCR: NOT DETECTED
PLT MORPHOLOGY: ABNORMAL
PMV BLD AUTO: 10 FL (ref 7.5–11.1)
PMV BLD AUTO: 10.1 FL (ref 7.5–11.1)
PMV BLD AUTO: 10.1 FL (ref 7.5–11.1)
PMV BLD AUTO: 10.2 FL (ref 7.5–11.1)
PMV BLD AUTO: 10.4 FL (ref 7.5–11.1)
PMV BLD AUTO: 10.8 FL (ref 7.5–11.1)
PMV BLD AUTO: 12.1 FL (ref 7.5–11.1)
PMV BLD AUTO: 9.2 FL (ref 7.5–11.1)
PMV BLD AUTO: 9.4 FL (ref 7.5–11.1)
PMV BLD AUTO: 9.7 FL (ref 7.5–11.1)
PMV BLD AUTO: 9.7 FL (ref 7.5–11.1)
PMV BLD AUTO: 9.8 FL (ref 7.5–11.1)
PMV BLD AUTO: 9.9 FL (ref 7.5–11.1)
POC BUN: 15 MG/DL (ref 8–26)
POC CALCIUM: 1.18 MMOL/L (ref 1.12–1.32)
POC CHLORIDE: 105 MMOL/L (ref 98–109)
POC CO2: 29 MMOL/L (ref 21–32)
POC CREATININE: 1.5 MG/DL (ref 0.9–1.3)
POC CREATININE: 1.5 MG/DL (ref 0.9–1.3)
POLYCHROMASIA: ABNORMAL
POTASSIUM SERPL-SCNC: 2.8 MMOL/L (ref 3.5–5.1)
POTASSIUM SERPL-SCNC: 3.4 MMOL/L (ref 3.5–5.1)
POTASSIUM SERPL-SCNC: 3.5 MMOL/L (ref 3.5–5.1)
POTASSIUM SERPL-SCNC: 4 MMOL/L (ref 3.5–4.5)
POTASSIUM SERPL-SCNC: 4.2 MMOL/L (ref 3.5–5.1)
POTASSIUM SERPL-SCNC: 4.2 MMOL/L (ref 3.5–5.1)
POTASSIUM SERPL-SCNC: 4.3 MMOL/L (ref 3.5–5.1)
POTASSIUM SERPL-SCNC: 4.3 MMOL/L (ref 3.5–5.1)
POTASSIUM SERPL-SCNC: 4.6 MMOL/L (ref 3.5–5.1)
POTASSIUM SERPL-SCNC: 4.9 MMOL/L (ref 3.5–5.1)
PROTEIN UA: NEGATIVE MG/DL
PROTHROMBIN TIME: 13.8 SECONDS (ref 11.7–14.5)
RBC # BLD: 3.09 M/CU MM (ref 4.6–6.2)
RBC # BLD: 3.13 M/CU MM (ref 4.6–6.2)
RBC # BLD: 3.16 M/CU MM (ref 4.6–6.2)
RBC # BLD: 3.25 M/CU MM (ref 4.6–6.2)
RBC # BLD: 3.27 M/CU MM (ref 4.6–6.2)
RBC # BLD: 3.28 M/CU MM (ref 4.6–6.2)
RBC # BLD: 3.44 M/CU MM (ref 4.6–6.2)
RBC # BLD: 3.49 M/CU MM (ref 4.6–6.2)
RBC # BLD: 3.51 M/CU MM (ref 4.6–6.2)
RBC # BLD: 3.6 M/CU MM (ref 4.6–6.2)
RBC # BLD: 3.71 M/CU MM (ref 4.6–6.2)
RBC # BLD: 3.77 M/CU MM (ref 4.6–6.2)
RBC # BLD: 4.35 M/CU MM (ref 4.6–6.2)
RBC URINE: ABNORMAL /HPF (ref 0–3)
ROTAVIRUS A PCR: NOT DETECTED
SALMONELLA PCR: NOT DETECTED
SAPOVIRUS PCR: NOT DETECTED
SARS-COV-2, NAAT: NOT DETECTED
SEGMENTED NEUTROPHILS ABSOLUTE COUNT: 0.5 K/CU MM
SEGMENTED NEUTROPHILS ABSOLUTE COUNT: 0.8 K/CU MM
SEGMENTED NEUTROPHILS ABSOLUTE COUNT: 1.3 K/CU MM
SEGMENTED NEUTROPHILS ABSOLUTE COUNT: 1.3 K/CU MM
SEGMENTED NEUTROPHILS ABSOLUTE COUNT: 2.2 K/CU MM
SEGMENTED NEUTROPHILS ABSOLUTE COUNT: 2.4 K/CU MM
SEGMENTED NEUTROPHILS ABSOLUTE COUNT: 2.4 K/CU MM
SEGMENTED NEUTROPHILS ABSOLUTE COUNT: 3.1 K/CU MM
SEGMENTED NEUTROPHILS ABSOLUTE COUNT: 3.5 K/CU MM
SEGMENTED NEUTROPHILS ABSOLUTE COUNT: 4.3 K/CU MM
SEGMENTED NEUTROPHILS ABSOLUTE COUNT: 4.9 K/CU MM
SEGMENTED NEUTROPHILS ABSOLUTE COUNT: 4.9 K/CU MM
SEGMENTED NEUTROPHILS RELATIVE PERCENT: 16 % (ref 36–66)
SEGMENTED NEUTROPHILS RELATIVE PERCENT: 22.8 % (ref 36–66)
SEGMENTED NEUTROPHILS RELATIVE PERCENT: 30.8 % (ref 36–66)
SEGMENTED NEUTROPHILS RELATIVE PERCENT: 47 % (ref 36–66)
SEGMENTED NEUTROPHILS RELATIVE PERCENT: 51.2 % (ref 36–66)
SEGMENTED NEUTROPHILS RELATIVE PERCENT: 55 % (ref 36–66)
SEGMENTED NEUTROPHILS RELATIVE PERCENT: 55.2 % (ref 36–66)
SEGMENTED NEUTROPHILS RELATIVE PERCENT: 57 % (ref 36–66)
SEGMENTED NEUTROPHILS RELATIVE PERCENT: 58.3 % (ref 36–66)
SEGMENTED NEUTROPHILS RELATIVE PERCENT: 64.4 % (ref 36–66)
SEGMENTED NEUTROPHILS RELATIVE PERCENT: 67.8 % (ref 36–66)
SEGMENTED NEUTROPHILS RELATIVE PERCENT: 68.9 % (ref 36–66)
SODIUM BLD-SCNC: 132 MMOL/L (ref 135–145)
SODIUM BLD-SCNC: 133 MMOL/L (ref 135–145)
SODIUM BLD-SCNC: 133 MMOL/L (ref 135–145)
SODIUM BLD-SCNC: 136 MMOL/L (ref 135–145)
SODIUM BLD-SCNC: 137 MMOL/L (ref 135–145)
SODIUM BLD-SCNC: 137 MMOL/L (ref 135–145)
SODIUM BLD-SCNC: 138 MMOL/L (ref 135–145)
SODIUM BLD-SCNC: 139 MMOL/L (ref 135–145)
SODIUM BLD-SCNC: 139 MMOL/L (ref 135–145)
SODIUM BLD-SCNC: 140 MMOL/L (ref 135–145)
SODIUM BLD-SCNC: 140 MMOL/L (ref 138–146)
SODIUM BLD-SCNC: 141 MMOL/L (ref 135–145)
SOURCE, BLOOD GAS: ABNORMAL
SOURCE: NORMAL
SPECIFIC GRAVITY UA: 1.02 (ref 1–1.03)
TARGET CELLS: ABNORMAL
TOTAL IMMATURE NEUTOROPHIL: 0.09 K/CU MM
TOTAL IRON BINDING CAPACITY: 182 UG/DL (ref 250–450)
TOTAL IRON BINDING CAPACITY: 192 UG/DL (ref 250–450)
TOTAL IRON BINDING CAPACITY: 254 UG/DL (ref 250–450)
TOTAL NUCLEATED RBC: 0 K/CU MM
TOTAL NUCLEATED RBC: 0 K/CU MM
TOTAL PROTEIN: 3.8 GM/DL (ref 6.4–8.2)
TOTAL PROTEIN: 5.2 GM/DL (ref 6.4–8.2)
TOTAL PROTEIN: 5.5 GM/DL (ref 6.4–8.2)
TOTAL PROTEIN: 5.6 GM/DL (ref 6.4–8.2)
TOTAL PROTEIN: 5.7 GM/DL (ref 6.4–8.2)
TOTAL PROTEIN: 5.8 GM/DL (ref 6.4–8.2)
TOXIC GRANULATION: PRESENT
TRICHOMONAS: ABNORMAL /HPF
UNSATURATED IRON BINDING CAPACITY: 111 UG/DL (ref 110–370)
UNSATURATED IRON BINDING CAPACITY: 141 UG/DL (ref 110–370)
UNSATURATED IRON BINDING CAPACITY: 178 UG/DL (ref 110–370)
UROBILINOGEN, URINE: 2 MG/DL (ref 0.2–1)
VIBRIO CHOLERAE PCR: NOT DETECTED
VIBRIO PCR: NOT DETECTED
VITAMIN B-12: >2000 PG/ML (ref 211–911)
WBC # BLD: 2.3 K/CU MM (ref 4–10.5)
WBC # BLD: 2.3 K/CU MM (ref 4–10.5)
WBC # BLD: 2.7 K/CU MM (ref 4–10.5)
WBC # BLD: 3.9 K/CU MM (ref 4–10.5)
WBC # BLD: 4.2 K/CU MM (ref 4–10.5)
WBC # BLD: 4.2 K/CU MM (ref 4–10.5)
WBC # BLD: 4.5 K/CU MM (ref 4–10.5)
WBC # BLD: 4.7 K/CU MM (ref 4–10.5)
WBC # BLD: 5.2 K/CU MM (ref 4–10.5)
WBC # BLD: 7.6 K/CU MM (ref 4–10.5)
WBC # BLD: 8.1 K/CU MM (ref 4–10.5)
WBC # BLD: 8.7 K/CU MM (ref 4–10.5)
WBC # BLD: 9.2 K/CU MM (ref 4–10.5)
WBC # BLD: ABNORMAL 10*3/UL
WBC UA: ABNORMAL /HPF (ref 0–2)
YERSINIA ENTEROCOLITICA PCR: NOT DETECTED

## 2021-01-01 PROCEDURE — 2580000003 HC RX 258: Performed by: PHYSICIAN ASSISTANT

## 2021-01-01 PROCEDURE — 2700000000 HC OXYGEN THERAPY PER DAY

## 2021-01-01 PROCEDURE — 6360000002 HC RX W HCPCS: Performed by: FAMILY MEDICINE

## 2021-01-01 PROCEDURE — 96375 TX/PRO/DX INJ NEW DRUG ADDON: CPT

## 2021-01-01 PROCEDURE — 88305 TISSUE EXAM BY PATHOLOGIST: CPT

## 2021-01-01 PROCEDURE — 77300 RADIATION THERAPY DOSE PLAN: CPT | Performed by: RADIOLOGY

## 2021-01-01 PROCEDURE — G0378 HOSPITAL OBSERVATION PER HR: HCPCS

## 2021-01-01 PROCEDURE — 72197 MRI PELVIS W/O & W/DYE: CPT

## 2021-01-01 PROCEDURE — 80053 COMPREHEN METABOLIC PANEL: CPT

## 2021-01-01 PROCEDURE — 85025 COMPLETE CBC W/AUTO DIFF WBC: CPT

## 2021-01-01 PROCEDURE — 81001 URINALYSIS AUTO W/SCOPE: CPT

## 2021-01-01 PROCEDURE — 7100000000 HC PACU RECOVERY - FIRST 15 MIN: Performed by: SURGERY

## 2021-01-01 PROCEDURE — 6360000002 HC RX W HCPCS: Performed by: NURSE PRACTITIONER

## 2021-01-01 PROCEDURE — 71260 CT THORAX DX C+: CPT

## 2021-01-01 PROCEDURE — 99204 OFFICE O/P NEW MOD 45 MIN: CPT | Performed by: RADIOLOGY

## 2021-01-01 PROCEDURE — 83735 ASSAY OF MAGNESIUM: CPT

## 2021-01-01 PROCEDURE — 3600000014 HC SURGERY LEVEL 4 ADDTL 15MIN: Performed by: SURGERY

## 2021-01-01 PROCEDURE — 99204 OFFICE O/P NEW MOD 45 MIN: CPT | Performed by: INTERNAL MEDICINE

## 2021-01-01 PROCEDURE — A9552 F18 FDG: HCPCS | Performed by: INTERNAL MEDICINE

## 2021-01-01 PROCEDURE — 82330 ASSAY OF CALCIUM: CPT

## 2021-01-01 PROCEDURE — 2580000003 HC RX 258: Performed by: RADIOLOGY

## 2021-01-01 PROCEDURE — 77373 STRTCTC BDY RAD THER TX DLVR: CPT | Performed by: RADIOLOGY

## 2021-01-01 PROCEDURE — 2580000003 HC RX 258: Performed by: INTERNAL MEDICINE

## 2021-01-01 PROCEDURE — 96368 THER/DIAG CONCURRENT INF: CPT

## 2021-01-01 PROCEDURE — 2500000003 HC RX 250 WO HCPCS: Performed by: ANESTHESIOLOGY

## 2021-01-01 PROCEDURE — 82378 CARCINOEMBRYONIC ANTIGEN: CPT

## 2021-01-01 PROCEDURE — A9579 GAD-BASE MR CONTRAST NOS,1ML: HCPCS | Performed by: INTERNAL MEDICINE

## 2021-01-01 PROCEDURE — 85027 COMPLETE CBC AUTOMATED: CPT

## 2021-01-01 PROCEDURE — 2580000003 HC RX 258: Performed by: FAMILY MEDICINE

## 2021-01-01 PROCEDURE — 6360000002 HC RX W HCPCS

## 2021-01-01 PROCEDURE — 82607 VITAMIN B-12: CPT

## 2021-01-01 PROCEDURE — 83540 ASSAY OF IRON: CPT

## 2021-01-01 PROCEDURE — 77301 RADIOTHERAPY DOSE PLAN IMRT: CPT | Performed by: RADIOLOGY

## 2021-01-01 PROCEDURE — 73030 X-RAY EXAM OF SHOULDER: CPT

## 2021-01-01 PROCEDURE — 96523 IRRIG DRUG DELIVERY DEVICE: CPT

## 2021-01-01 PROCEDURE — 83690 ASSAY OF LIPASE: CPT

## 2021-01-01 PROCEDURE — 96374 THER/PROPH/DIAG INJ IV PUSH: CPT

## 2021-01-01 PROCEDURE — 77263 THER RADIOLOGY TX PLNG CPLX: CPT | Performed by: RADIOLOGY

## 2021-01-01 PROCEDURE — 6360000004 HC RX CONTRAST MEDICATION: Performed by: INTERNAL MEDICINE

## 2021-01-01 PROCEDURE — 7100000010 HC PHASE II RECOVERY - FIRST 15 MIN

## 2021-01-01 PROCEDURE — 96361 HYDRATE IV INFUSION ADD-ON: CPT

## 2021-01-01 PROCEDURE — C1788 PORT, INDWELLING, IMP: HCPCS

## 2021-01-01 PROCEDURE — 99211 OFF/OP EST MAY X REQ PHY/QHP: CPT

## 2021-01-01 PROCEDURE — 71045 X-RAY EXAM CHEST 1 VIEW: CPT

## 2021-01-01 PROCEDURE — 3430000000 HC RX DIAGNOSTIC RADIOPHARMACEUTICAL: Performed by: INTERNAL MEDICINE

## 2021-01-01 PROCEDURE — 96417 CHEMO IV INFUS EACH ADDL SEQ: CPT

## 2021-01-01 PROCEDURE — 77001 FLUOROGUIDE FOR VEIN DEVICE: CPT

## 2021-01-01 PROCEDURE — 2500000003 HC RX 250 WO HCPCS: Performed by: NURSE ANESTHETIST, CERTIFIED REGISTERED

## 2021-01-01 PROCEDURE — 83550 IRON BINDING TEST: CPT

## 2021-01-01 PROCEDURE — 2500000003 HC RX 250 WO HCPCS: Performed by: INTERNAL MEDICINE

## 2021-01-01 PROCEDURE — 96411 CHEMO IV PUSH ADDL DRUG: CPT

## 2021-01-01 PROCEDURE — 88108 CYTOPATH CONCENTRATE TECH: CPT

## 2021-01-01 PROCEDURE — 96365 THER/PROPH/DIAG IV INF INIT: CPT

## 2021-01-01 PROCEDURE — 88333 PATH CONSLTJ SURG CYTO XM 1: CPT | Performed by: PATHOLOGY

## 2021-01-01 PROCEDURE — 99214 OFFICE O/P EST MOD 30 MIN: CPT | Performed by: INTERNAL MEDICINE

## 2021-01-01 PROCEDURE — 83605 ASSAY OF LACTIC ACID: CPT

## 2021-01-01 PROCEDURE — 87070 CULTURE OTHR SPECIMN AEROBIC: CPT

## 2021-01-01 PROCEDURE — 44160 REMOVAL OF COLON: CPT | Performed by: PHYSICIAN ASSISTANT

## 2021-01-01 PROCEDURE — 3700000001 HC ADD 15 MINUTES (ANESTHESIA): Performed by: SURGERY

## 2021-01-01 PROCEDURE — 2709999900 HC NON-CHARGEABLE SUPPLY

## 2021-01-01 PROCEDURE — 74177 CT ABD & PELVIS W/CONTRAST: CPT

## 2021-01-01 PROCEDURE — 82746 ASSAY OF FOLIC ACID SERUM: CPT

## 2021-01-01 PROCEDURE — 77293 RESPIRATOR MOTION MGMT SIMUL: CPT | Performed by: RADIOLOGY

## 2021-01-01 PROCEDURE — 85007 BL SMEAR W/DIFF WBC COUNT: CPT

## 2021-01-01 PROCEDURE — 99285 EMERGENCY DEPT VISIT HI MDM: CPT

## 2021-01-01 PROCEDURE — 44310 ILEOSTOMY/JEJUNOSTOMY: CPT | Performed by: SURGERY

## 2021-01-01 PROCEDURE — 6360000002 HC RX W HCPCS: Performed by: EMERGENCY MEDICINE

## 2021-01-01 PROCEDURE — 99214 OFFICE O/P EST MOD 30 MIN: CPT | Performed by: SURGERY

## 2021-01-01 PROCEDURE — C9113 INJ PANTOPRAZOLE SODIUM, VIA: HCPCS | Performed by: FAMILY MEDICINE

## 2021-01-01 PROCEDURE — 86901 BLOOD TYPING SEROLOGIC RH(D): CPT

## 2021-01-01 PROCEDURE — 2580000003 HC RX 258: Performed by: ANESTHESIOLOGY

## 2021-01-01 PROCEDURE — 0D1B0Z4 BYPASS ILEUM TO CUTANEOUS, OPEN APPROACH: ICD-10-PCS | Performed by: SURGERY

## 2021-01-01 PROCEDURE — 82728 ASSAY OF FERRITIN: CPT

## 2021-01-01 PROCEDURE — 6360000002 HC RX W HCPCS: Performed by: PHYSICIAN ASSISTANT

## 2021-01-01 PROCEDURE — 44160 REMOVAL OF COLON: CPT | Performed by: SURGERY

## 2021-01-01 PROCEDURE — 6360000002 HC RX W HCPCS: Performed by: INTERNAL MEDICINE

## 2021-01-01 PROCEDURE — 6360000002 HC RX W HCPCS: Performed by: ANESTHESIOLOGY

## 2021-01-01 PROCEDURE — 2580000003 HC RX 258: Performed by: EMERGENCY MEDICINE

## 2021-01-01 PROCEDURE — 99202 OFFICE O/P NEW SF 15 MIN: CPT

## 2021-01-01 PROCEDURE — 76937 US GUIDE VASCULAR ACCESS: CPT

## 2021-01-01 PROCEDURE — 74176 CT ABD & PELVIS W/O CONTRAST: CPT

## 2021-01-01 PROCEDURE — 77334 RADIATION TREATMENT AID(S): CPT | Performed by: RADIOLOGY

## 2021-01-01 PROCEDURE — 36415 COLL VENOUS BLD VENIPUNCTURE: CPT

## 2021-01-01 PROCEDURE — 94761 N-INVAS EAR/PLS OXIMETRY MLT: CPT

## 2021-01-01 PROCEDURE — 88341 IMHCHEM/IMCYTCHM EA ADD ANTB: CPT | Performed by: PATHOLOGY

## 2021-01-01 PROCEDURE — 99213 OFFICE O/P EST LOW 20 MIN: CPT

## 2021-01-01 PROCEDURE — 77336 RADIATION PHYSICS CONSULT: CPT | Performed by: RADIOLOGY

## 2021-01-01 PROCEDURE — 77290 THER RAD SIMULAJ FIELD CPLX: CPT | Performed by: RADIOLOGY

## 2021-01-01 PROCEDURE — 2000000000 HC ICU R&B

## 2021-01-01 PROCEDURE — 2500000003 HC RX 250 WO HCPCS: Performed by: NURSE PRACTITIONER

## 2021-01-01 PROCEDURE — 87075 CULTR BACTERIA EXCEPT BLOOD: CPT

## 2021-01-01 PROCEDURE — 77338 DESIGN MLC DEVICE FOR IMRT: CPT | Performed by: RADIOLOGY

## 2021-01-01 PROCEDURE — 2720000010 HC SURG SUPPLY STERILE: Performed by: SURGERY

## 2021-01-01 PROCEDURE — 87635 SARS-COV-2 COVID-19 AMP PRB: CPT

## 2021-01-01 PROCEDURE — 96415 CHEMO IV INFUSION ADDL HR: CPT

## 2021-01-01 PROCEDURE — 86900 BLOOD TYPING SEROLOGIC ABO: CPT

## 2021-01-01 PROCEDURE — C1894 INTRO/SHEATH, NON-LASER: HCPCS

## 2021-01-01 PROCEDURE — C1751 CATH, INF, PER/CENT/MIDLINE: HCPCS | Performed by: SURGERY

## 2021-01-01 PROCEDURE — 6360000002 HC RX W HCPCS: Performed by: SURGERY

## 2021-01-01 PROCEDURE — 87077 CULTURE AEROBIC IDENTIFY: CPT

## 2021-01-01 PROCEDURE — APPNB180 APP NON BILLABLE TIME > 60 MINS: Performed by: PHYSICIAN ASSISTANT

## 2021-01-01 PROCEDURE — 97605 NEG PRS WND THER DME<=50SQCM: CPT | Performed by: SURGERY

## 2021-01-01 PROCEDURE — 99999 PR OFFICE/OUTPT VISIT,PROCEDURE ONLY: CPT | Performed by: RADIOLOGY

## 2021-01-01 PROCEDURE — 99291 CRITICAL CARE FIRST HOUR: CPT | Performed by: SURGERY

## 2021-01-01 PROCEDURE — 6360000002 HC RX W HCPCS: Performed by: NURSE ANESTHETIST, CERTIFIED REGISTERED

## 2021-01-01 PROCEDURE — 2580000003 HC RX 258: Performed by: NURSE PRACTITIONER

## 2021-01-01 PROCEDURE — 36561 INSERT TUNNELED CV CATH: CPT

## 2021-01-01 PROCEDURE — 96413 CHEMO IV INFUSION 1 HR: CPT

## 2021-01-01 PROCEDURE — 7100000011 HC PHASE II RECOVERY - ADDTL 15 MIN

## 2021-01-01 PROCEDURE — 1200000000 HC SEMI PRIVATE

## 2021-01-01 PROCEDURE — P9047 ALBUMIN (HUMAN), 25%, 50ML: HCPCS

## 2021-01-01 PROCEDURE — 2580000003 HC RX 258: Performed by: NURSE ANESTHETIST, CERTIFIED REGISTERED

## 2021-01-01 PROCEDURE — 78815 PET IMAGE W/CT SKULL-THIGH: CPT

## 2021-01-01 PROCEDURE — P9045 ALBUMIN (HUMAN), 5%, 250 ML: HCPCS | Performed by: NURSE ANESTHETIST, CERTIFIED REGISTERED

## 2021-01-01 PROCEDURE — 3600000004 HC SURGERY LEVEL 4 BASE: Performed by: SURGERY

## 2021-01-01 PROCEDURE — 6360000004 HC RX CONTRAST MEDICATION: Performed by: PHYSICIAN ASSISTANT

## 2021-01-01 PROCEDURE — 96360 HYDRATION IV INFUSION INIT: CPT

## 2021-01-01 PROCEDURE — 87324 CLOSTRIDIUM AG IA: CPT

## 2021-01-01 PROCEDURE — 44310 ILEOSTOMY/JEJUNOSTOMY: CPT | Performed by: PHYSICIAN ASSISTANT

## 2021-01-01 PROCEDURE — 88342 IMHCHEM/IMCYTCHM 1ST ANTB: CPT | Performed by: PATHOLOGY

## 2021-01-01 PROCEDURE — 88307 TISSUE EXAM BY PATHOLOGIST: CPT

## 2021-01-01 PROCEDURE — 7100000001 HC PACU RECOVERY - ADDTL 15 MIN: Performed by: SURGERY

## 2021-01-01 PROCEDURE — 88334 PATH CONSLTJ SURG CYTO XM EA: CPT | Performed by: PATHOLOGY

## 2021-01-01 PROCEDURE — 3700000000 HC ANESTHESIA ATTENDED CARE: Performed by: SURGERY

## 2021-01-01 PROCEDURE — 87205 SMEAR GRAM STAIN: CPT

## 2021-01-01 PROCEDURE — 99215 OFFICE O/P EST HI 40 MIN: CPT | Performed by: NURSE PRACTITIONER

## 2021-01-01 PROCEDURE — 84100 ASSAY OF PHOSPHORUS: CPT

## 2021-01-01 PROCEDURE — 51798 US URINE CAPACITY MEASURE: CPT

## 2021-01-01 PROCEDURE — P9047 ALBUMIN (HUMAN), 25%, 50ML: HCPCS | Performed by: FAMILY MEDICINE

## 2021-01-01 PROCEDURE — 86850 RBC ANTIBODY SCREEN: CPT

## 2021-01-01 PROCEDURE — 85730 THROMBOPLASTIN TIME PARTIAL: CPT

## 2021-01-01 PROCEDURE — 32408 CORE NDL BX LNG/MED PERQ: CPT

## 2021-01-01 PROCEDURE — 6370000000 HC RX 637 (ALT 250 FOR IP): Performed by: RADIOLOGY

## 2021-01-01 PROCEDURE — 0DTF0ZZ RESECTION OF RIGHT LARGE INTESTINE, OPEN APPROACH: ICD-10-PCS | Performed by: SURGERY

## 2021-01-01 PROCEDURE — 85610 PROTHROMBIN TIME: CPT

## 2021-01-01 PROCEDURE — 87186 SC STD MICRODIL/AGAR DIL: CPT

## 2021-01-01 PROCEDURE — 99204 OFFICE O/P NEW MOD 45 MIN: CPT | Performed by: SURGERY

## 2021-01-01 PROCEDURE — 6360000002 HC RX W HCPCS: Performed by: RADIOLOGY

## 2021-01-01 PROCEDURE — 77435 SBRT MANAGEMENT: CPT | Performed by: RADIOLOGY

## 2021-01-01 PROCEDURE — 02HV33Z INSERTION OF INFUSION DEVICE INTO SUPERIOR VENA CAVA, PERCUTANEOUS APPROACH: ICD-10-PCS | Performed by: SURGERY

## 2021-01-01 PROCEDURE — 87507 IADNA-DNA/RNA PROBE TQ 12-25: CPT

## 2021-01-01 PROCEDURE — 88342 IMHCHEM/IMCYTCHM 1ST ANTB: CPT

## 2021-01-01 PROCEDURE — 96367 TX/PROPH/DG ADDL SEQ IV INF: CPT

## 2021-01-01 PROCEDURE — 77470 SPECIAL RADIATION TREATMENT: CPT | Performed by: RADIOLOGY

## 2021-01-01 PROCEDURE — 2709999900 HC NON-CHARGEABLE SUPPLY: Performed by: SURGERY

## 2021-01-01 PROCEDURE — 80048 BASIC METABOLIC PNL TOTAL CA: CPT

## 2021-01-01 PROCEDURE — 88305 TISSUE EXAM BY PATHOLOGIST: CPT | Performed by: PATHOLOGY

## 2021-01-01 RX ORDER — HEPARIN SODIUM (PORCINE) LOCK FLUSH IV SOLN 100 UNIT/ML 100 UNIT/ML
500 SOLUTION INTRAVENOUS PRN
Status: CANCELLED | OUTPATIENT
Start: 2021-01-01

## 2021-01-01 RX ORDER — ONDANSETRON HYDROCHLORIDE 8 MG/1
8 TABLET, FILM COATED ORAL EVERY 8 HOURS PRN
Qty: 30 TABLET | Refills: 1 | Status: SHIPPED | OUTPATIENT
Start: 2021-01-01

## 2021-01-01 RX ORDER — ONDANSETRON 2 MG/ML
4 INJECTION INTRAMUSCULAR; INTRAVENOUS ONCE
Status: COMPLETED | OUTPATIENT
Start: 2021-01-01 | End: 2021-01-01

## 2021-01-01 RX ORDER — KETOROLAC TROMETHAMINE 30 MG/ML
30 INJECTION, SOLUTION INTRAMUSCULAR; INTRAVENOUS EVERY 6 HOURS PRN
Status: DISCONTINUED | OUTPATIENT
Start: 2021-01-01 | End: 2021-01-01

## 2021-01-01 RX ORDER — DEXTROSE MONOHYDRATE 50 MG/ML
INJECTION, SOLUTION INTRAVENOUS ONCE
Status: DISCONTINUED | OUTPATIENT
Start: 2021-01-01 | End: 2021-01-01 | Stop reason: HOSPADM

## 2021-01-01 RX ORDER — METHYLPREDNISOLONE SODIUM SUCCINATE 125 MG/2ML
125 INJECTION, POWDER, LYOPHILIZED, FOR SOLUTION INTRAMUSCULAR; INTRAVENOUS ONCE
Status: CANCELLED | OUTPATIENT
Start: 2021-01-01 | End: 2021-01-01

## 2021-01-01 RX ORDER — ONDANSETRON 2 MG/ML
4 INJECTION INTRAMUSCULAR; INTRAVENOUS EVERY 6 HOURS PRN
Status: DISCONTINUED | OUTPATIENT
Start: 2021-01-01 | End: 2021-01-01 | Stop reason: HOSPADM

## 2021-01-01 RX ORDER — ONDANSETRON 2 MG/ML
8 INJECTION INTRAMUSCULAR; INTRAVENOUS ONCE
Status: COMPLETED | OUTPATIENT
Start: 2021-01-01 | End: 2021-01-01

## 2021-01-01 RX ORDER — 0.9 % SODIUM CHLORIDE 0.9 %
1000 INTRAVENOUS SOLUTION INTRAVENOUS ONCE
Status: COMPLETED | OUTPATIENT
Start: 2021-01-01 | End: 2021-01-01

## 2021-01-01 RX ORDER — DRONABINOL 2.5 MG/1
2.5 CAPSULE ORAL
Qty: 60 CAPSULE | Refills: 0 | Status: SHIPPED | OUTPATIENT
Start: 2021-01-01 | End: 2021-12-31

## 2021-01-01 RX ORDER — DEXAMETHASONE 4 MG/1
TABLET ORAL
Qty: 16 TABLET | Refills: 3 | Status: SHIPPED | OUTPATIENT
Start: 2021-01-01

## 2021-01-01 RX ORDER — CLORAZEPATE DIPOTASSIUM 7.5 MG/1
TABLET ORAL
COMMUNITY
Start: 2021-01-01

## 2021-01-01 RX ORDER — SODIUM CHLORIDE 0.9 % (FLUSH) 0.9 %
5-40 SYRINGE (ML) INJECTION PRN
Status: CANCELLED | OUTPATIENT
Start: 2021-01-01

## 2021-01-01 RX ORDER — GABAPENTIN 300 MG/1
300 CAPSULE ORAL NIGHTLY
Status: DISCONTINUED | OUTPATIENT
Start: 2021-01-01 | End: 2021-01-01 | Stop reason: HOSPADM

## 2021-01-01 RX ORDER — MIDAZOLAM HYDROCHLORIDE 2 MG/2ML
0.5 INJECTION, SOLUTION INTRAMUSCULAR; INTRAVENOUS ONCE
Status: COMPLETED | OUTPATIENT
Start: 2021-01-01 | End: 2021-01-01

## 2021-01-01 RX ORDER — HYDROCODONE BITARTRATE AND ACETAMINOPHEN 5; 325 MG/1; MG/1
1 TABLET ORAL EVERY 8 HOURS PRN
Qty: 45 TABLET | Refills: 0 | Status: SHIPPED | OUTPATIENT
Start: 2021-01-01 | End: 2021-01-01

## 2021-01-01 RX ORDER — SODIUM CHLORIDE 9 MG/ML
25 INJECTION, SOLUTION INTRAVENOUS PRN
Status: CANCELLED | OUTPATIENT
Start: 2021-01-01

## 2021-01-01 RX ORDER — NOREPINEPHRINE BIT/0.9 % NACL 16MG/250ML
2-100 INFUSION BOTTLE (ML) INTRAVENOUS CONTINUOUS
Status: DISCONTINUED | OUTPATIENT
Start: 2021-01-01 | End: 2021-01-01 | Stop reason: CLARIF

## 2021-01-01 RX ORDER — ALBUMIN (HUMAN) 12.5 G/50ML
SOLUTION INTRAVENOUS
Status: COMPLETED
Start: 2021-01-01 | End: 2021-01-01

## 2021-01-01 RX ORDER — MORPHINE SULFATE 2 MG/ML
2 INJECTION, SOLUTION INTRAMUSCULAR; INTRAVENOUS EVERY 4 HOURS PRN
Status: DISCONTINUED | OUTPATIENT
Start: 2021-01-01 | End: 2021-01-01 | Stop reason: HOSPADM

## 2021-01-01 RX ORDER — POTASSIUM CHLORIDE 7.45 MG/ML
40 INJECTION INTRAVENOUS ONCE
Status: COMPLETED | OUTPATIENT
Start: 2021-01-01 | End: 2021-01-01

## 2021-01-01 RX ORDER — HEPARIN SODIUM (PORCINE) LOCK FLUSH IV SOLN 100 UNIT/ML 100 UNIT/ML
500 SOLUTION INTRAVENOUS PRN
Status: DISCONTINUED | OUTPATIENT
Start: 2021-01-01 | End: 2021-01-01 | Stop reason: HOSPADM

## 2021-01-01 RX ORDER — FENTANYL CITRATE 50 UG/ML
50 INJECTION, SOLUTION INTRAMUSCULAR; INTRAVENOUS ONCE
Status: DISCONTINUED | OUTPATIENT
Start: 2021-01-01 | End: 2021-01-01 | Stop reason: HOSPADM

## 2021-01-01 RX ORDER — PROPOFOL 10 MG/ML
INJECTION, EMULSION INTRAVENOUS PRN
Status: DISCONTINUED | OUTPATIENT
Start: 2021-01-01 | End: 2021-01-01 | Stop reason: SDUPTHER

## 2021-01-01 RX ORDER — IPRATROPIUM BROMIDE AND ALBUTEROL SULFATE 2.5; .5 MG/3ML; MG/3ML
1 SOLUTION RESPIRATORY (INHALATION) EVERY 4 HOURS PRN
Status: DISCONTINUED | OUTPATIENT
Start: 2021-01-01 | End: 2021-01-01 | Stop reason: HOSPADM

## 2021-01-01 RX ORDER — ONDANSETRON HYDROCHLORIDE 8 MG/1
8 TABLET, FILM COATED ORAL EVERY 8 HOURS PRN
Qty: 30 TABLET | Refills: 1 | Status: SHIPPED | OUTPATIENT
Start: 2021-01-01 | End: 2021-01-01 | Stop reason: SDUPTHER

## 2021-01-01 RX ORDER — DEXAMETHASONE SODIUM PHOSPHATE 4 MG/ML
8 INJECTION, SOLUTION INTRA-ARTICULAR; INTRALESIONAL; INTRAMUSCULAR; INTRAVENOUS; SOFT TISSUE ONCE
Status: COMPLETED | OUTPATIENT
Start: 2021-01-01 | End: 2021-01-01

## 2021-01-01 RX ORDER — LOPERAMIDE HYDROCHLORIDE 2 MG/1
CAPSULE ORAL
Qty: 60 CAPSULE | Refills: 1 | Status: SHIPPED | OUTPATIENT
Start: 2021-01-01

## 2021-01-01 RX ORDER — 0.9 % SODIUM CHLORIDE 0.9 %
1000 INTRAVENOUS SOLUTION INTRAVENOUS ONCE
Status: CANCELLED | OUTPATIENT
Start: 2021-01-01 | End: 2021-01-01

## 2021-01-01 RX ORDER — SODIUM CHLORIDE 0.9 % (FLUSH) 0.9 %
5-40 SYRINGE (ML) INJECTION EVERY 12 HOURS SCHEDULED
Status: DISCONTINUED | OUTPATIENT
Start: 2021-01-01 | End: 2021-01-01 | Stop reason: HOSPADM

## 2021-01-01 RX ORDER — OXYCODONE HCL 40 MG/1
40 TABLET, FILM COATED, EXTENDED RELEASE ORAL EVERY 12 HOURS
COMMUNITY
End: 2021-01-01

## 2021-01-01 RX ORDER — POLYETHYLENE GLYCOL 3350 17 G/17G
17 POWDER, FOR SOLUTION ORAL DAILY PRN
Status: DISCONTINUED | OUTPATIENT
Start: 2021-01-01 | End: 2021-01-01 | Stop reason: HOSPADM

## 2021-01-01 RX ORDER — HYDROCODONE BITARTRATE AND ACETAMINOPHEN 5; 325 MG/1; MG/1
1 TABLET ORAL EVERY 6 HOURS PRN
Qty: 120 TABLET | Refills: 0 | Status: SHIPPED | OUTPATIENT
Start: 2021-01-01 | End: 2021-01-01

## 2021-01-01 RX ORDER — PRAVASTATIN SODIUM 40 MG
TABLET ORAL
COMMUNITY
Start: 2021-01-01 | End: 2021-01-01 | Stop reason: HOSPADM

## 2021-01-01 RX ORDER — CIPROFLOXACIN 2 MG/ML
400 INJECTION, SOLUTION INTRAVENOUS EVERY 12 HOURS
Status: DISCONTINUED | OUTPATIENT
Start: 2021-01-01 | End: 2021-01-01 | Stop reason: HOSPADM

## 2021-01-01 RX ORDER — DEXAMETHASONE 4 MG/1
TABLET ORAL
Qty: 16 TABLET | Refills: 0 | Status: SHIPPED | OUTPATIENT
Start: 2021-01-01 | End: 2021-01-01 | Stop reason: SDUPTHER

## 2021-01-01 RX ORDER — SODIUM CHLORIDE 9 MG/ML
25 INJECTION, SOLUTION INTRAVENOUS PRN
Status: DISCONTINUED | OUTPATIENT
Start: 2021-01-01 | End: 2021-01-01 | Stop reason: HOSPADM

## 2021-01-01 RX ORDER — DEXAMETHASONE SODIUM PHOSPHATE 10 MG/ML
8 INJECTION, SOLUTION INTRAMUSCULAR; INTRAVENOUS ONCE
Status: CANCELLED
Start: 2021-01-01 | End: 2021-01-01

## 2021-01-01 RX ORDER — ATENOLOL 25 MG/1
TABLET ORAL
COMMUNITY
Start: 2021-01-01

## 2021-01-01 RX ORDER — ASPIRIN 81 MG/1
81 TABLET, CHEWABLE ORAL DAILY
Status: DISCONTINUED | OUTPATIENT
Start: 2021-01-01 | End: 2021-01-01 | Stop reason: HOSPADM

## 2021-01-01 RX ORDER — ALBUMIN, HUMAN INJ 5% 5 %
12.5 SOLUTION INTRAVENOUS ONCE
Status: CANCELLED | OUTPATIENT
Start: 2021-01-01 | End: 2021-01-01

## 2021-01-01 RX ORDER — ATORVASTATIN CALCIUM 20 MG/1
20 TABLET, FILM COATED ORAL DAILY
Status: DISCONTINUED | OUTPATIENT
Start: 2021-01-01 | End: 2021-01-01 | Stop reason: HOSPADM

## 2021-01-01 RX ORDER — HYDROCODONE BITARTRATE AND ACETAMINOPHEN 5; 325 MG/1; MG/1
1 TABLET ORAL EVERY 8 HOURS PRN
Qty: 45 TABLET | Refills: 0 | Status: SHIPPED | OUTPATIENT
Start: 2021-01-01 | End: 2021-12-18

## 2021-01-01 RX ORDER — MORPHINE SULFATE 2 MG/ML
4 INJECTION, SOLUTION INTRAMUSCULAR; INTRAVENOUS EVERY 30 MIN PRN
Status: DISCONTINUED | OUTPATIENT
Start: 2021-01-01 | End: 2021-01-01

## 2021-01-01 RX ORDER — METRONIDAZOLE 500 MG/1
500 TABLET ORAL 3 TIMES DAILY
Qty: 30 TABLET | Refills: 0 | Status: SHIPPED | OUTPATIENT
Start: 2021-01-01 | End: 2021-01-01

## 2021-01-01 RX ORDER — SODIUM CHLORIDE, SODIUM LACTATE, POTASSIUM CHLORIDE, CALCIUM CHLORIDE 600; 310; 30; 20 MG/100ML; MG/100ML; MG/100ML; MG/100ML
INJECTION, SOLUTION INTRAVENOUS CONTINUOUS
Status: DISCONTINUED | OUTPATIENT
Start: 2021-01-01 | End: 2021-01-01 | Stop reason: HOSPADM

## 2021-01-01 RX ORDER — TRAZODONE HYDROCHLORIDE 100 MG/1
TABLET ORAL
COMMUNITY
Start: 2021-01-01

## 2021-01-01 RX ORDER — OLANZAPINE 5 MG/1
TABLET ORAL
Qty: 24 TABLET | Refills: 0 | Status: SHIPPED | OUTPATIENT
Start: 2021-01-01

## 2021-01-01 RX ORDER — HYDROCODONE BITARTRATE AND ACETAMINOPHEN 5; 325 MG/1; MG/1
1 TABLET ORAL EVERY 6 HOURS PRN
Status: DISCONTINUED | OUTPATIENT
Start: 2021-01-01 | End: 2021-01-01 | Stop reason: HOSPADM

## 2021-01-01 RX ORDER — SODIUM CHLORIDE 0.9 % (FLUSH) 0.9 %
10 SYRINGE (ML) INJECTION PRN
Status: COMPLETED | OUTPATIENT
Start: 2021-01-01 | End: 2021-01-01

## 2021-01-01 RX ORDER — ONDANSETRON 2 MG/ML
4 INJECTION INTRAMUSCULAR; INTRAVENOUS EVERY 30 MIN PRN
Status: DISCONTINUED | OUTPATIENT
Start: 2021-01-01 | End: 2021-01-01 | Stop reason: SDUPTHER

## 2021-01-01 RX ORDER — KETOROLAC TROMETHAMINE 30 MG/ML
15 INJECTION, SOLUTION INTRAMUSCULAR; INTRAVENOUS EVERY 6 HOURS PRN
Status: DISCONTINUED | OUTPATIENT
Start: 2021-01-01 | End: 2021-01-01 | Stop reason: HOSPADM

## 2021-01-01 RX ORDER — SODIUM CHLORIDE 0.9 % (FLUSH) 0.9 %
10 SYRINGE (ML) INJECTION PRN
Status: DISCONTINUED | OUTPATIENT
Start: 2021-01-01 | End: 2021-01-01 | Stop reason: HOSPADM

## 2021-01-01 RX ORDER — HYDROMORPHONE HCL 110MG/55ML
1 PATIENT CONTROLLED ANALGESIA SYRINGE INTRAVENOUS
Status: DISCONTINUED | OUTPATIENT
Start: 2021-01-01 | End: 2021-01-01 | Stop reason: HOSPADM

## 2021-01-01 RX ORDER — SODIUM CHLORIDE 0.9 % (FLUSH) 0.9 %
5-40 SYRINGE (ML) INJECTION PRN
Status: DISCONTINUED | OUTPATIENT
Start: 2021-01-01 | End: 2021-01-01 | Stop reason: HOSPADM

## 2021-01-01 RX ORDER — HEPARIN SODIUM (PORCINE) LOCK FLUSH IV SOLN 100 UNIT/ML 100 UNIT/ML
SOLUTION INTRAVENOUS
Status: COMPLETED
Start: 2021-01-01 | End: 2021-01-01

## 2021-01-01 RX ORDER — SODIUM CHLORIDE 9 MG/ML
INJECTION, SOLUTION INTRAVENOUS CONTINUOUS
Status: DISCONTINUED | OUTPATIENT
Start: 2021-01-01 | End: 2021-01-01

## 2021-01-01 RX ORDER — MAGNESIUM SULFATE IN WATER 40 MG/ML
2000 INJECTION, SOLUTION INTRAVENOUS PRN
Status: DISCONTINUED | OUTPATIENT
Start: 2021-01-01 | End: 2021-01-01 | Stop reason: HOSPADM

## 2021-01-01 RX ORDER — FENTANYL CITRATE 50 UG/ML
25 INJECTION, SOLUTION INTRAMUSCULAR; INTRAVENOUS ONCE
Status: COMPLETED | OUTPATIENT
Start: 2021-01-01 | End: 2021-01-01

## 2021-01-01 RX ORDER — ONDANSETRON 4 MG/1
4 TABLET, ORALLY DISINTEGRATING ORAL EVERY 8 HOURS PRN
Status: DISCONTINUED | OUTPATIENT
Start: 2021-01-01 | End: 2021-01-01 | Stop reason: HOSPADM

## 2021-01-01 RX ORDER — ETOMIDATE 2 MG/ML
INJECTION INTRAVENOUS PRN
Status: DISCONTINUED | OUTPATIENT
Start: 2021-01-01 | End: 2021-01-01 | Stop reason: SDUPTHER

## 2021-01-01 RX ORDER — CIPROFLOXACIN 500 MG/1
500 TABLET, FILM COATED ORAL 2 TIMES DAILY
Qty: 20 TABLET | Refills: 0 | Status: SHIPPED | OUTPATIENT
Start: 2021-01-01 | End: 2021-01-01

## 2021-01-01 RX ORDER — ROCURONIUM BROMIDE 10 MG/ML
INJECTION, SOLUTION INTRAVENOUS PRN
Status: DISCONTINUED | OUTPATIENT
Start: 2021-01-01 | End: 2021-01-01 | Stop reason: SDUPTHER

## 2021-01-01 RX ORDER — ATENOLOL 50 MG/1
50 TABLET ORAL ONCE
Status: COMPLETED | OUTPATIENT
Start: 2021-01-01 | End: 2021-01-01

## 2021-01-01 RX ORDER — PHENYLEPHRINE HYDROCHLORIDE 10 MG/ML
INJECTION INTRAVENOUS PRN
Status: DISCONTINUED | OUTPATIENT
Start: 2021-01-01 | End: 2021-01-01 | Stop reason: SDUPTHER

## 2021-01-01 RX ORDER — PANTOPRAZOLE SODIUM 40 MG/10ML
40 INJECTION, POWDER, LYOPHILIZED, FOR SOLUTION INTRAVENOUS DAILY
Status: DISCONTINUED | OUTPATIENT
Start: 2021-01-01 | End: 2021-01-01 | Stop reason: HOSPADM

## 2021-01-01 RX ORDER — ONDANSETRON 2 MG/ML
8 INJECTION INTRAMUSCULAR; INTRAVENOUS ONCE
Status: CANCELLED
Start: 2021-01-01 | End: 2021-01-01

## 2021-01-01 RX ORDER — PALONOSETRON 0.05 MG/ML
0.25 INJECTION, SOLUTION INTRAVENOUS ONCE
Status: COMPLETED | OUTPATIENT
Start: 2021-01-01 | End: 2021-01-01

## 2021-01-01 RX ORDER — HYDROCODONE BITARTRATE AND ACETAMINOPHEN 5; 325 MG/1; MG/1
1 TABLET ORAL EVERY 6 HOURS PRN
Qty: 120 TABLET | Refills: 0 | Status: CANCELLED | OUTPATIENT
Start: 2021-01-01 | End: 2021-01-01

## 2021-01-01 RX ORDER — FLUDEOXYGLUCOSE F 18 200 MCI/ML
17.04 INJECTION, SOLUTION INTRAVENOUS
Status: COMPLETED | OUTPATIENT
Start: 2021-01-01 | End: 2021-01-01

## 2021-01-01 RX ORDER — NOREPINEPHRINE BIT/0.9 % NACL 16MG/250ML
2-100 INFUSION BOTTLE (ML) INTRAVENOUS CONTINUOUS
Status: DISCONTINUED | OUTPATIENT
Start: 2021-01-01 | End: 2021-01-01 | Stop reason: SDUPTHER

## 2021-01-01 RX ORDER — LORAZEPAM 2 MG/ML
1 INJECTION INTRAMUSCULAR EVERY 4 HOURS PRN
Status: DISCONTINUED | OUTPATIENT
Start: 2021-01-01 | End: 2021-01-01 | Stop reason: HOSPADM

## 2021-01-01 RX ORDER — NICOTINE POLACRILEX 2 MG
GUM BUCCAL
COMMUNITY
Start: 2021-01-01

## 2021-01-01 RX ORDER — DIPHENOXYLATE HYDROCHLORIDE AND ATROPINE SULFATE 2.5; .025 MG/1; MG/1
1 TABLET ORAL 4 TIMES DAILY PRN
Qty: 60 TABLET | Refills: 1 | Status: SHIPPED | OUTPATIENT
Start: 2021-01-01 | End: 2021-12-18

## 2021-01-01 RX ORDER — LACTOBACILLUS RHAMNOSUS GG 10B CELL
1 CAPSULE ORAL DAILY
Status: DISCONTINUED | OUTPATIENT
Start: 2021-01-01 | End: 2021-01-01 | Stop reason: HOSPADM

## 2021-01-01 RX ORDER — LIDOCAINE HYDROCHLORIDE 20 MG/ML
INJECTION, SOLUTION INTRAVENOUS PRN
Status: DISCONTINUED | OUTPATIENT
Start: 2021-01-01 | End: 2021-01-01 | Stop reason: SDUPTHER

## 2021-01-01 RX ORDER — MIDAZOLAM HYDROCHLORIDE 2 MG/2ML
1 INJECTION, SOLUTION INTRAMUSCULAR; INTRAVENOUS ONCE
Status: DISCONTINUED | OUTPATIENT
Start: 2021-01-01 | End: 2021-01-01 | Stop reason: HOSPADM

## 2021-01-01 RX ORDER — PHENYLEPHRINE HCL IN 0.9% NACL 1 MG/10 ML
SYRINGE (ML) INTRAVENOUS PRN
Status: DISCONTINUED | OUTPATIENT
Start: 2021-01-01 | End: 2021-01-01 | Stop reason: SDUPTHER

## 2021-01-01 RX ORDER — HYDROCODONE BITARTRATE AND ACETAMINOPHEN 5; 325 MG/1; MG/1
1 TABLET ORAL EVERY 6 HOURS PRN
Qty: 120 TABLET | Refills: 0 | Status: SHIPPED | OUTPATIENT
Start: 2021-01-01 | End: 2021-01-01 | Stop reason: SDUPTHER

## 2021-01-01 RX ORDER — SODIUM CHLORIDE 9 MG/ML
INJECTION, SOLUTION INTRAVENOUS CONTINUOUS
Status: DISCONTINUED | OUTPATIENT
Start: 2021-01-01 | End: 2021-01-01 | Stop reason: SDUPTHER

## 2021-01-01 RX ORDER — POTASSIUM CHLORIDE 29.8 MG/ML
20 INJECTION INTRAVENOUS ONCE
Status: COMPLETED | OUTPATIENT
Start: 2021-01-01 | End: 2021-01-01

## 2021-01-01 RX ORDER — CEPHALEXIN 500 MG/1
500 CAPSULE ORAL 3 TIMES DAILY
Qty: 30 CAPSULE | Refills: 0 | Status: SHIPPED | OUTPATIENT
Start: 2021-01-01

## 2021-01-01 RX ORDER — MORPHINE SULFATE 2 MG/ML
2 INJECTION, SOLUTION INTRAMUSCULAR; INTRAVENOUS ONCE
Status: DISCONTINUED | OUTPATIENT
Start: 2021-01-01 | End: 2021-01-01 | Stop reason: HOSPADM

## 2021-01-01 RX ORDER — VASOPRESSIN 20 U/ML
INJECTION PARENTERAL PRN
Status: DISCONTINUED | OUTPATIENT
Start: 2021-01-01 | End: 2021-01-01 | Stop reason: SDUPTHER

## 2021-01-01 RX ORDER — DIPHENOXYLATE HYDROCHLORIDE AND ATROPINE SULFATE 2.5; .025 MG/1; MG/1
1 TABLET ORAL 4 TIMES DAILY PRN
Qty: 60 TABLET | Refills: 1 | Status: SHIPPED | OUTPATIENT
Start: 2021-01-01 | End: 2021-01-01 | Stop reason: SDUPTHER

## 2021-01-01 RX ORDER — CIPROFLOXACIN 250 MG/1
500 TABLET, FILM COATED ORAL 2 TIMES DAILY
Qty: 40 TABLET | Refills: 0 | Status: SHIPPED | OUTPATIENT
Start: 2021-01-01 | End: 2021-01-01

## 2021-01-01 RX ORDER — ACETAMINOPHEN 325 MG/1
650 TABLET ORAL EVERY 6 HOURS PRN
Status: DISCONTINUED | OUTPATIENT
Start: 2021-01-01 | End: 2021-01-01 | Stop reason: HOSPADM

## 2021-01-01 RX ORDER — FENTANYL CITRATE 50 UG/ML
INJECTION, SOLUTION INTRAMUSCULAR; INTRAVENOUS PRN
Status: DISCONTINUED | OUTPATIENT
Start: 2021-01-01 | End: 2021-01-01 | Stop reason: SDUPTHER

## 2021-01-01 RX ORDER — ACETAMINOPHEN 650 MG/1
650 SUPPOSITORY RECTAL EVERY 6 HOURS PRN
Status: DISCONTINUED | OUTPATIENT
Start: 2021-01-01 | End: 2021-01-01 | Stop reason: HOSPADM

## 2021-01-01 RX ORDER — POTASSIUM CHLORIDE 29.8 MG/ML
20 INJECTION INTRAVENOUS PRN
Status: DISCONTINUED | OUTPATIENT
Start: 2021-01-01 | End: 2021-01-01 | Stop reason: HOSPADM

## 2021-01-01 RX ORDER — CHOLESTYRAMINE 4 G/9G
1 POWDER, FOR SUSPENSION ORAL 2 TIMES DAILY
Qty: 90 PACKET | Refills: 3 | Status: SHIPPED | OUTPATIENT
Start: 2021-01-01

## 2021-01-01 RX ORDER — NOREPINEPHRINE BIT/0.9 % NACL 16MG/250ML
2-100 INFUSION BOTTLE (ML) INTRAVENOUS CONTINUOUS
Status: DISCONTINUED | OUTPATIENT
Start: 2021-01-01 | End: 2021-01-01 | Stop reason: HOSPADM

## 2021-01-01 RX ORDER — LEVOFLOXACIN 5 MG/ML
INJECTION, SOLUTION INTRAVENOUS PRN
Status: DISCONTINUED | OUTPATIENT
Start: 2021-01-01 | End: 2021-01-01 | Stop reason: SDUPTHER

## 2021-01-01 RX ORDER — ATENOLOL 25 MG/1
50 TABLET ORAL DAILY
Status: DISCONTINUED | OUTPATIENT
Start: 2021-01-01 | End: 2021-01-01 | Stop reason: HOSPADM

## 2021-01-01 RX ORDER — CILOSTAZOL 50 MG/1
50 TABLET ORAL 2 TIMES DAILY
Status: DISCONTINUED | OUTPATIENT
Start: 2021-01-01 | End: 2021-01-01 | Stop reason: HOSPADM

## 2021-01-01 RX ORDER — SODIUM CHLORIDE 9 MG/ML
INJECTION, SOLUTION INTRAVENOUS ONCE
Status: DISCONTINUED | OUTPATIENT
Start: 2021-01-01 | End: 2021-01-01 | Stop reason: HOSPADM

## 2021-01-01 RX ORDER — ALBUMIN (HUMAN) 12.5 G/50ML
25 SOLUTION INTRAVENOUS ONCE
Status: COMPLETED | OUTPATIENT
Start: 2021-01-01 | End: 2021-01-01

## 2021-01-01 RX ORDER — CAPECITABINE 500 MG/1
1500 TABLET, FILM COATED ORAL 2 TIMES DAILY
Qty: 150 TABLET | Refills: 0 | Status: SHIPPED | OUTPATIENT
Start: 2021-01-01 | End: 2021-12-26

## 2021-01-01 RX ORDER — 0.9 % SODIUM CHLORIDE 0.9 %
500 INTRAVENOUS SOLUTION INTRAVENOUS ONCE
Status: COMPLETED | OUTPATIENT
Start: 2021-01-01 | End: 2021-01-01

## 2021-01-01 RX ORDER — DIPHENHYDRAMINE HYDROCHLORIDE 50 MG/ML
50 INJECTION INTRAMUSCULAR; INTRAVENOUS ONCE
Status: CANCELLED | OUTPATIENT
Start: 2021-01-01 | End: 2021-01-01

## 2021-01-01 RX ORDER — POTASSIUM CHLORIDE AND SODIUM CHLORIDE 900; 300 MG/100ML; MG/100ML
INJECTION, SOLUTION INTRAVENOUS ONCE
Status: DISCONTINUED | OUTPATIENT
Start: 2021-01-01 | End: 2021-01-01 | Stop reason: ALTCHOICE

## 2021-01-01 RX ORDER — OLANZAPINE 5 MG/1
TABLET ORAL
Qty: 24 TABLET | Refills: 0 | Status: SHIPPED | OUTPATIENT
Start: 2021-01-01 | End: 2021-01-01 | Stop reason: SDUPTHER

## 2021-01-01 RX ORDER — POTASSIUM CHLORIDE 7.45 MG/ML
10 INJECTION INTRAVENOUS PRN
Status: DISCONTINUED | OUTPATIENT
Start: 2021-01-01 | End: 2021-01-01 | Stop reason: HOSPADM

## 2021-01-01 RX ORDER — ALBUMIN, HUMAN INJ 5% 5 %
SOLUTION INTRAVENOUS PRN
Status: DISCONTINUED | OUTPATIENT
Start: 2021-01-01 | End: 2021-01-01 | Stop reason: SDUPTHER

## 2021-01-01 RX ORDER — EPINEPHRINE 1 MG/ML
0.3 INJECTION, SOLUTION, CONCENTRATE INTRAVENOUS PRN
Status: CANCELLED | OUTPATIENT
Start: 2021-01-01

## 2021-01-01 RX ORDER — SODIUM CHLORIDE 9 MG/ML
INJECTION, SOLUTION INTRAVENOUS CONTINUOUS
Status: CANCELLED | OUTPATIENT
Start: 2021-01-01

## 2021-01-01 RX ORDER — SUCCINYLCHOLINE/SOD CL,ISO/PF 100 MG/5ML
SYRINGE (ML) INTRAVENOUS PRN
Status: DISCONTINUED | OUTPATIENT
Start: 2021-01-01 | End: 2021-01-01 | Stop reason: SDUPTHER

## 2021-01-01 RX ORDER — SODIUM CHLORIDE 9 MG/ML
INJECTION, SOLUTION INTRAVENOUS ONCE
Status: COMPLETED | OUTPATIENT
Start: 2021-01-01 | End: 2021-01-01

## 2021-01-01 RX ADMIN — ALBUMIN (HUMAN) 12.5 G: 12.5 INJECTION, SOLUTION INTRAVENOUS at 09:52

## 2021-01-01 RX ADMIN — Medication 100 MG: at 09:21

## 2021-01-01 RX ADMIN — SODIUM CHLORIDE 1000 ML: 9 INJECTION, SOLUTION INTRAVENOUS at 04:27

## 2021-01-01 RX ADMIN — SODIUM CHLORIDE, POTASSIUM CHLORIDE, SODIUM LACTATE AND CALCIUM CHLORIDE 125 ML/HR: 600; 310; 30; 20 INJECTION, SOLUTION INTRAVENOUS at 15:27

## 2021-01-01 RX ADMIN — CIPROFLOXACIN 400 MG: 2 INJECTION, SOLUTION INTRAVENOUS at 21:33

## 2021-01-01 RX ADMIN — HEPARIN 500 UNITS: 100 SYRINGE at 13:28

## 2021-01-01 RX ADMIN — PHENYLEPHRINE HYDROCHLORIDE 220 MCG/MIN: 10 INJECTION INTRAVENOUS at 08:18

## 2021-01-01 RX ADMIN — ONDANSETRON 4 MG: 2 INJECTION INTRAMUSCULAR; INTRAVENOUS at 04:26

## 2021-01-01 RX ADMIN — HEPARIN 500 UNITS: 100 SYRINGE at 11:50

## 2021-01-01 RX ADMIN — LEVOFLOXACIN 500 MG: 5 INJECTION, SOLUTION INTRAVENOUS at 09:21

## 2021-01-01 RX ADMIN — HEPARIN SODIUM (PORCINE) LOCK FLUSH IV SOLN 100 UNIT/ML 500 UNITS: 100 SOLUTION at 12:31

## 2021-01-01 RX ADMIN — FLUDEOXYGLUCOSE F 18 17.04 MILLICURIE: 200 INJECTION, SOLUTION INTRAVENOUS at 14:49

## 2021-01-01 RX ADMIN — KETOROLAC TROMETHAMINE 30 MG: 30 INJECTION, SOLUTION INTRAMUSCULAR at 14:35

## 2021-01-01 RX ADMIN — GADOTERIDOL 20 ML: 279.3 INJECTION, SOLUTION INTRAVENOUS at 19:05

## 2021-01-01 RX ADMIN — PROPOFOL 10 MG: 10 INJECTION, EMULSION INTRAVENOUS at 09:32

## 2021-01-01 RX ADMIN — MORPHINE SULFATE 2 MG: 2 INJECTION, SOLUTION INTRAMUSCULAR; INTRAVENOUS at 04:48

## 2021-01-01 RX ADMIN — PHENYLEPHRINE HYDROCHLORIDE 200 MCG: 10 INJECTION INTRAVENOUS at 09:31

## 2021-01-01 RX ADMIN — MORPHINE SULFATE 4 MG: 2 INJECTION, SOLUTION INTRAMUSCULAR; INTRAVENOUS at 04:27

## 2021-01-01 RX ADMIN — Medication 70 MCG/MIN: at 02:32

## 2021-01-01 RX ADMIN — ALBUMIN (HUMAN) 25 G: 0.25 INJECTION, SOLUTION INTRAVENOUS at 19:40

## 2021-01-01 RX ADMIN — FENTANYL CITRATE 25 MCG: 50 INJECTION, SOLUTION INTRAMUSCULAR; INTRAVENOUS at 09:32

## 2021-01-01 RX ADMIN — PHENYLEPHRINE HYDROCHLORIDE 50 MCG/MIN: 10 INJECTION INTRAVENOUS at 09:44

## 2021-01-01 RX ADMIN — VASOPRESSIN 1 UNITS: 20 INJECTION INTRAVENOUS at 10:48

## 2021-01-01 RX ADMIN — PIPERACILLIN AND TAZOBACTAM 3375 MG: 3; .375 INJECTION, POWDER, FOR SOLUTION INTRAVENOUS at 07:10

## 2021-01-01 RX ADMIN — POTASSIUM CHLORIDE 20 MEQ: 29.8 INJECTION INTRAVENOUS at 16:37

## 2021-01-01 RX ADMIN — HYDROMORPHONE HYDROCHLORIDE 1 MG: 2 INJECTION, SOLUTION INTRAMUSCULAR; INTRAVENOUS; SUBCUTANEOUS at 10:38

## 2021-01-01 RX ADMIN — FAMOTIDINE 20 MG: 10 INJECTION, SOLUTION INTRAVENOUS at 12:47

## 2021-01-01 RX ADMIN — FENTANYL CITRATE 25 MCG: 0.05 INJECTION, SOLUTION INTRAMUSCULAR; INTRAVENOUS at 14:05

## 2021-01-01 RX ADMIN — PANTOPRAZOLE SODIUM 40 MG: 40 INJECTION, POWDER, FOR SOLUTION INTRAVENOUS at 15:49

## 2021-01-01 RX ADMIN — LEUCOVORIN CALCIUM 836 MG: 10 INJECTION INTRAMUSCULAR; INTRAVENOUS at 09:31

## 2021-01-01 RX ADMIN — SODIUM CHLORIDE 1000 ML: 9 INJECTION, SOLUTION INTRAVENOUS at 11:22

## 2021-01-01 RX ADMIN — PIPERACILLIN AND TAZOBACTAM 3375 MG: 3; .375 INJECTION, POWDER, LYOPHILIZED, FOR SOLUTION INTRAVENOUS at 15:53

## 2021-01-01 RX ADMIN — SUGAMMADEX 100 MG: 100 INJECTION, SOLUTION INTRAVENOUS at 10:58

## 2021-01-01 RX ADMIN — FLUOROURACIL 825 MG: 50 INJECTION, SOLUTION INTRAVENOUS at 11:48

## 2021-01-01 RX ADMIN — PHENYLEPHRINE HYDROCHLORIDE 135 MCG/MIN: 10 INJECTION INTRAVENOUS at 20:03

## 2021-01-01 RX ADMIN — IOPAMIDOL 81 ML: 755 INJECTION, SOLUTION INTRAVENOUS at 15:18

## 2021-01-01 RX ADMIN — FLUOROURACIL 825 MG: 50 INJECTION, SOLUTION INTRAVENOUS at 13:14

## 2021-01-01 RX ADMIN — POTASSIUM CHLORIDE 10 MEQ: 7.46 INJECTION, SOLUTION INTRAVENOUS at 05:46

## 2021-01-01 RX ADMIN — FENTANYL CITRATE 25 MCG: 50 INJECTION, SOLUTION INTRAMUSCULAR; INTRAVENOUS at 09:21

## 2021-01-01 RX ADMIN — LEUCOVORIN CALCIUM 850 MG: 500 INJECTION, POWDER, LYOPHILIZED, FOR SOLUTION INTRAMUSCULAR; INTRAVENOUS at 10:51

## 2021-01-01 RX ADMIN — ALBUMIN (HUMAN) 12.5 G: 0.25 INJECTION, SOLUTION INTRAVENOUS at 08:41

## 2021-01-01 RX ADMIN — SODIUM CHLORIDE, PRESERVATIVE FREE 10 ML: 5 INJECTION INTRAVENOUS at 08:22

## 2021-01-01 RX ADMIN — KETOROLAC TROMETHAMINE 30 MG: 30 INJECTION, SOLUTION INTRAMUSCULAR at 02:08

## 2021-01-01 RX ADMIN — PHENYLEPHRINE HYDROCHLORIDE 160 MCG/MIN: 10 INJECTION INTRAVENOUS at 05:23

## 2021-01-01 RX ADMIN — SODIUM CHLORIDE, PRESERVATIVE FREE 10 ML: 5 INJECTION INTRAVENOUS at 14:48

## 2021-01-01 RX ADMIN — SODIUM CHLORIDE, POTASSIUM CHLORIDE, SODIUM LACTATE AND CALCIUM CHLORIDE: 600; 310; 30; 20 INJECTION, SOLUTION INTRAVENOUS at 18:29

## 2021-01-01 RX ADMIN — MIDAZOLAM HYDROCHLORIDE 0.5 MG: 1 INJECTION, SOLUTION INTRAMUSCULAR; INTRAVENOUS at 09:03

## 2021-01-01 RX ADMIN — SODIUM CHLORIDE, PRESERVATIVE FREE 20 ML: 5 INJECTION INTRAVENOUS at 10:51

## 2021-01-01 RX ADMIN — PALONOSETRON 0.25 MG: 0.25 INJECTION, SOLUTION INTRAVENOUS at 08:52

## 2021-01-01 RX ADMIN — FENTANYL CITRATE 25 MCG: 50 INJECTION, SOLUTION INTRAMUSCULAR; INTRAVENOUS at 09:03

## 2021-01-01 RX ADMIN — HEPARIN SODIUM (PORCINE) LOCK FLUSH IV SOLN 100 UNIT/ML 500 UNITS: 100 SOLUTION at 10:52

## 2021-01-01 RX ADMIN — Medication 100 MCG/MIN: at 09:09

## 2021-01-01 RX ADMIN — KETOROLAC TROMETHAMINE 30 MG: 30 INJECTION, SOLUTION INTRAMUSCULAR at 07:56

## 2021-01-01 RX ADMIN — IOPAMIDOL 75 ML: 755 INJECTION, SOLUTION INTRAVENOUS at 05:38

## 2021-01-01 RX ADMIN — OXALIPLATIN 180 MG: 5 INJECTION, SOLUTION INTRAVENOUS at 10:51

## 2021-01-01 RX ADMIN — Medication 63 MCG/MIN: at 22:53

## 2021-01-01 RX ADMIN — KETOROLAC TROMETHAMINE 30 MG: 30 INJECTION, SOLUTION INTRAMUSCULAR at 20:25

## 2021-01-01 RX ADMIN — DEXAMETHASONE SODIUM PHOSPHATE 12 MG: 4 INJECTION INTRA-ARTICULAR; INTRALESIONAL; INTRAMUSCULAR; INTRAVENOUS; SOFT TISSUE at 08:51

## 2021-01-01 RX ADMIN — PHENYLEPHRINE HYDROCHLORIDE 300 MCG/MIN: 10 INJECTION INTRAVENOUS at 11:44

## 2021-01-01 RX ADMIN — SODIUM CHLORIDE, PRESERVATIVE FREE 10 ML: 5 INJECTION INTRAVENOUS at 11:50

## 2021-01-01 RX ADMIN — Medication 4 MCG/MIN: at 11:56

## 2021-01-01 RX ADMIN — SODIUM CHLORIDE, PRESERVATIVE FREE 10 ML: 5 INJECTION INTRAVENOUS at 13:28

## 2021-01-01 RX ADMIN — ENOXAPARIN SODIUM 40 MG: 100 INJECTION SUBCUTANEOUS at 08:22

## 2021-01-01 RX ADMIN — PHENYLEPHRINE HYDROCHLORIDE 150 MCG/MIN: 10 INJECTION INTRAVENOUS at 02:30

## 2021-01-01 RX ADMIN — POTASSIUM CHLORIDE 20 MEQ: 29.8 INJECTION INTRAVENOUS at 08:40

## 2021-01-01 RX ADMIN — SODIUM CHLORIDE: 9 INJECTION, SOLUTION INTRAVENOUS at 08:50

## 2021-01-01 RX ADMIN — PHENYLEPHRINE HYDROCHLORIDE 300 MCG/MIN: 10 INJECTION INTRAVENOUS at 10:06

## 2021-01-01 RX ADMIN — SODIUM CHLORIDE, PRESERVATIVE FREE 10 ML: 5 INJECTION INTRAVENOUS at 11:45

## 2021-01-01 RX ADMIN — ETOMIDATE 5 MG: 2 INJECTION, SOLUTION INTRAVENOUS at 09:32

## 2021-01-01 RX ADMIN — PIPERACILLIN AND TAZOBACTAM 3375 MG: 3; .375 INJECTION, POWDER, LYOPHILIZED, FOR SOLUTION INTRAVENOUS at 08:31

## 2021-01-01 RX ADMIN — SODIUM CHLORIDE: 9 INJECTION, SOLUTION INTRAVENOUS at 10:13

## 2021-01-01 RX ADMIN — OXALIPLATIN 178 MG: 5 INJECTION, SOLUTION INTRAVENOUS at 09:31

## 2021-01-01 RX ADMIN — PANTOPRAZOLE SODIUM 40 MG: 40 INJECTION, POWDER, FOR SOLUTION INTRAVENOUS at 07:56

## 2021-01-01 RX ADMIN — VASOPRESSIN 0.04 UNITS/MIN: 20 INJECTION INTRAVENOUS at 22:43

## 2021-01-01 RX ADMIN — POTASSIUM CHLORIDE 20 MEQ: 29.8 INJECTION INTRAVENOUS at 09:23

## 2021-01-01 RX ADMIN — PALONOSETRON 0.25 MG: 0.25 INJECTION, SOLUTION INTRAVENOUS at 10:11

## 2021-01-01 RX ADMIN — METRONIDAZOLE 500 MG: 500 INJECTION, SOLUTION INTRAVENOUS at 21:33

## 2021-01-01 RX ADMIN — IOPAMIDOL 75 ML: 755 INJECTION, SOLUTION INTRAVENOUS at 11:48

## 2021-01-01 RX ADMIN — SODIUM CHLORIDE 1000 ML: 9 INJECTION, SOLUTION INTRAVENOUS at 09:03

## 2021-01-01 RX ADMIN — SUGAMMADEX 100 MG: 100 INJECTION, SOLUTION INTRAVENOUS at 11:03

## 2021-01-01 RX ADMIN — PHENYLEPHRINE HYDROCHLORIDE 200 MCG: 10 INJECTION INTRAVENOUS at 09:35

## 2021-01-01 RX ADMIN — SODIUM CHLORIDE 1000 ML: 9 INJECTION, SOLUTION INTRAVENOUS at 10:25

## 2021-01-01 RX ADMIN — SODIUM CHLORIDE 1000 ML: 9 INJECTION, SOLUTION INTRAVENOUS at 06:15

## 2021-01-01 RX ADMIN — VASOPRESSIN 0.04 UNITS/MIN: 20 INJECTION INTRAVENOUS at 05:11

## 2021-01-01 RX ADMIN — DEXTROSE MONOHYDRATE: 50 INJECTION, SOLUTION INTRAVENOUS at 09:31

## 2021-01-01 RX ADMIN — SODIUM CHLORIDE 1000 ML: 9 INJECTION, SOLUTION INTRAVENOUS at 07:03

## 2021-01-01 RX ADMIN — PIPERACILLIN AND TAZOBACTAM 3375 MG: 3; .375 INJECTION, POWDER, LYOPHILIZED, FOR SOLUTION INTRAVENOUS at 23:01

## 2021-01-01 RX ADMIN — METRONIDAZOLE 500 MG: 500 INJECTION, SOLUTION INTRAVENOUS at 09:46

## 2021-01-01 RX ADMIN — PHENYLEPHRINE HYDROCHLORIDE 70 MCG/MIN: 10 INJECTION INTRAVENOUS at 15:27

## 2021-01-01 RX ADMIN — ROCURONIUM BROMIDE 50 MG: 10 INJECTION INTRAVENOUS at 10:08

## 2021-01-01 RX ADMIN — Medication 5 MCG/MIN: at 10:52

## 2021-01-01 RX ADMIN — PHENYLEPHRINE HYDROCHLORIDE 200 MCG: 10 INJECTION INTRAVENOUS at 09:27

## 2021-01-01 RX ADMIN — Medication 200 MCG: at 09:32

## 2021-01-01 RX ADMIN — Medication 58 MCG/MIN: at 18:20

## 2021-01-01 RX ADMIN — HEPARIN 500 UNITS: 100 SYRINGE at 10:52

## 2021-01-01 RX ADMIN — Medication 10 ML: at 10:21

## 2021-01-01 RX ADMIN — SODIUM CHLORIDE 500 ML: 9 INJECTION, SOLUTION INTRAVENOUS at 08:55

## 2021-01-01 RX ADMIN — LORAZEPAM 1 MG: 2 INJECTION INTRAMUSCULAR; INTRAVENOUS at 11:25

## 2021-01-01 RX ADMIN — SODIUM CHLORIDE 1000 ML: 9 INJECTION, SOLUTION INTRAVENOUS at 12:22

## 2021-01-01 RX ADMIN — VASOPRESSIN 2 UNITS: 20 INJECTION INTRAVENOUS at 10:20

## 2021-01-01 RX ADMIN — POTASSIUM CHLORIDE 20 MEQ: 29.8 INJECTION INTRAVENOUS at 17:39

## 2021-01-01 RX ADMIN — ONDANSETRON 8 MG: 2 INJECTION INTRAMUSCULAR; INTRAVENOUS at 12:47

## 2021-01-01 RX ADMIN — Medication 80 MCG/MIN: at 05:53

## 2021-01-01 RX ADMIN — DEXAMETHASONE SODIUM PHOSPHATE 12 MG: 4 INJECTION, SOLUTION INTRAMUSCULAR; INTRAVENOUS at 10:14

## 2021-01-01 RX ADMIN — SODIUM CHLORIDE, PRESERVATIVE FREE 10 ML: 5 INJECTION INTRAVENOUS at 21:00

## 2021-01-01 RX ADMIN — DEXTROSE MONOHYDRATE: 50 INJECTION, SOLUTION INTRAVENOUS at 10:48

## 2021-01-01 RX ADMIN — ONDANSETRON 4 MG: 2 INJECTION INTRAMUSCULAR; INTRAVENOUS at 07:56

## 2021-01-01 RX ADMIN — MORPHINE SULFATE 2 MG: 2 INJECTION, SOLUTION INTRAMUSCULAR; INTRAVENOUS at 22:57

## 2021-01-01 RX ADMIN — DEXAMETHASONE SODIUM PHOSPHATE 8 MG: 4 INJECTION, SOLUTION INTRAMUSCULAR; INTRAVENOUS at 11:27

## 2021-01-01 RX ADMIN — HEPARIN 500 UNITS: 100 SYRINGE at 12:31

## 2021-01-01 RX ADMIN — DEXAMETHASONE SODIUM PHOSPHATE 8 MG: 4 INJECTION, SOLUTION INTRAMUSCULAR; INTRAVENOUS at 12:47

## 2021-01-01 RX ADMIN — LIDOCAINE HYDROCHLORIDE 50 MG: 20 INJECTION, SOLUTION INTRAVENOUS at 09:33

## 2021-01-01 RX ADMIN — ATENOLOL 50 MG: 50 TABLET ORAL at 12:40

## 2021-01-01 RX ADMIN — PHENYLEPHRINE HYDROCHLORIDE 150 MCG/MIN: 10 INJECTION INTRAVENOUS at 23:26

## 2021-01-01 RX ADMIN — FAMOTIDINE 20 MG: 10 INJECTION, SOLUTION INTRAVENOUS at 11:26

## 2021-01-01 RX ADMIN — ONDANSETRON 4 MG: 2 INJECTION INTRAMUSCULAR; INTRAVENOUS at 14:04

## 2021-01-01 RX ADMIN — SODIUM CHLORIDE 1000 ML: 9 INJECTION, SOLUTION INTRAVENOUS at 14:03

## 2021-01-01 ASSESSMENT — PAIN SCALES - GENERAL
PAINLEVEL_OUTOF10: 10
PAINLEVEL_OUTOF10: 6
PAINLEVEL_OUTOF10: 10
PAINLEVEL_OUTOF10: 9
PAINLEVEL_OUTOF10: 10
PAINLEVEL_OUTOF10: 7
PAINLEVEL_OUTOF10: 10
PAINLEVEL_OUTOF10: 0
PAINLEVEL_OUTOF10: 9
PAINLEVEL_OUTOF10: 10
PAINLEVEL_OUTOF10: 10
PAINLEVEL_OUTOF10: 3
PAINLEVEL_OUTOF10: 7
PAINLEVEL_OUTOF10: 10
PAINLEVEL_OUTOF10: 9
PAINLEVEL_OUTOF10: 0
PAINLEVEL_OUTOF10: 10

## 2021-01-01 ASSESSMENT — PULMONARY FUNCTION TESTS
PIF_VALUE: 18
PIF_VALUE: 18
PIF_VALUE: 26
PIF_VALUE: 18
PIF_VALUE: 18
PIF_VALUE: 16
PIF_VALUE: 18
PIF_VALUE: 23
PIF_VALUE: 19
PIF_VALUE: 27
PIF_VALUE: 16
PIF_VALUE: 18
PIF_VALUE: 30
PIF_VALUE: 18
PIF_VALUE: 18
PIF_VALUE: 17
PIF_VALUE: 2
PIF_VALUE: 18
PIF_VALUE: 23
PIF_VALUE: 19
PIF_VALUE: 18
PIF_VALUE: 18
PIF_VALUE: 17
PIF_VALUE: 23
PIF_VALUE: 2
PIF_VALUE: 2
PIF_VALUE: 17
PIF_VALUE: 18
PIF_VALUE: 30
PIF_VALUE: 7
PIF_VALUE: 18
PIF_VALUE: 19
PIF_VALUE: 2
PIF_VALUE: 2
PIF_VALUE: 30
PIF_VALUE: 2
PIF_VALUE: 17
PIF_VALUE: 23
PIF_VALUE: 20
PIF_VALUE: 23
PIF_VALUE: 23
PIF_VALUE: 19
PIF_VALUE: 19
PIF_VALUE: 16
PIF_VALUE: 15
PIF_VALUE: 19
PIF_VALUE: 16
PIF_VALUE: 18
PIF_VALUE: 2
PIF_VALUE: 18
PIF_VALUE: 18
PIF_VALUE: 16
PIF_VALUE: 17
PIF_VALUE: 11
PIF_VALUE: 18
PIF_VALUE: 19
PIF_VALUE: 18
PIF_VALUE: 1
PIF_VALUE: 17
PIF_VALUE: 17
PIF_VALUE: 16
PIF_VALUE: 2
PIF_VALUE: 19
PIF_VALUE: 23
PIF_VALUE: 2
PIF_VALUE: 24
PIF_VALUE: 18
PIF_VALUE: 16
PIF_VALUE: 17
PIF_VALUE: 2
PIF_VALUE: 17
PIF_VALUE: 17
PIF_VALUE: 16
PIF_VALUE: 18
PIF_VALUE: 30
PIF_VALUE: 16
PIF_VALUE: 30
PIF_VALUE: 17
PIF_VALUE: 17
PIF_VALUE: 16
PIF_VALUE: 18
PIF_VALUE: 23
PIF_VALUE: 19
PIF_VALUE: 2
PIF_VALUE: 23
PIF_VALUE: 17
PIF_VALUE: 17
PIF_VALUE: 19
PIF_VALUE: 20
PIF_VALUE: 18
PIF_VALUE: 2
PIF_VALUE: 24
PIF_VALUE: 18
PIF_VALUE: 18
PIF_VALUE: 19
PIF_VALUE: 24
PIF_VALUE: 18
PIF_VALUE: 23
PIF_VALUE: 16
PIF_VALUE: 2
PIF_VALUE: 2
PIF_VALUE: 30
PIF_VALUE: 2
PIF_VALUE: 16
PIF_VALUE: 30
PIF_VALUE: 30
PIF_VALUE: 19
PIF_VALUE: 30
PIF_VALUE: 23
PIF_VALUE: 25
PIF_VALUE: 20
PIF_VALUE: 18
PIF_VALUE: 18
PIF_VALUE: 30
PIF_VALUE: 30
PIF_VALUE: 18
PIF_VALUE: 18

## 2021-01-01 ASSESSMENT — ENCOUNTER SYMPTOMS
PHOTOPHOBIA: 0
ABDOMINAL PAIN: 1
BACK PAIN: 0
EYES NEGATIVE: 1
ALLERGIC/IMMUNOLOGIC NEGATIVE: 1
EYE ITCHING: 0
EYE PAIN: 0
NAUSEA: 1
ANAL BLEEDING: 0
PHOTOPHOBIA: 0
VOMITING: 0
STRIDOR: 0
RECTAL PAIN: 1
SORE THROAT: 0
VOMITING: 0
BACK PAIN: 0
STRIDOR: 0
APNEA: 0
EYE ITCHING: 0
ABDOMINAL DISTENTION: 1
SHORTNESS OF BREATH: 1
RHINORRHEA: 0
EYE REDNESS: 0
APNEA: 0
NAUSEA: 1
COUGH: 0
SHORTNESS OF BREATH: 0
RESPIRATORY NEGATIVE: 1
ANAL BLEEDING: 0
RECTAL PAIN: 0
COLOR CHANGE: 0
CONSTIPATION: 1
CHOKING: 0
BACK PAIN: 0
SORE THROAT: 0
CHOKING: 0
ABDOMINAL PAIN: 1
CONSTIPATION: 0
DIARRHEA: 1
COLOR CHANGE: 0
EYE REDNESS: 0

## 2021-01-01 ASSESSMENT — PAIN - FUNCTIONAL ASSESSMENT: PAIN_FUNCTIONAL_ASSESSMENT: 0-10

## 2021-01-01 ASSESSMENT — PAIN DESCRIPTION - LOCATION
LOCATION: RECTUM
LOCATION: ABDOMEN
LOCATION: SHOULDER
LOCATION: ABDOMEN
LOCATION: RECTUM
LOCATION: ABDOMEN
LOCATION: ABDOMEN

## 2021-01-01 ASSESSMENT — PAIN DESCRIPTION - DESCRIPTORS
DESCRIPTORS: SHARP
DESCRIPTORS: PRESSURE
DESCRIPTORS: CONSTANT;ACHING
DESCRIPTORS: SORE

## 2021-01-01 ASSESSMENT — PAIN DESCRIPTION - PAIN TYPE
TYPE: SURGICAL PAIN
TYPE: SURGICAL PAIN
TYPE: ACUTE PAIN
TYPE: SURGICAL PAIN
TYPE: SURGICAL PAIN

## 2021-01-01 ASSESSMENT — PAIN DESCRIPTION - PROGRESSION: CLINICAL_PROGRESSION: NOT CHANGED

## 2021-01-01 ASSESSMENT — PATIENT HEALTH QUESTIONNAIRE - PHQ9
SUM OF ALL RESPONSES TO PHQ9 QUESTIONS 1 & 2: 0
SUM OF ALL RESPONSES TO PHQ QUESTIONS 1-9: 0
SUM OF ALL RESPONSES TO PHQ QUESTIONS 1-9: 0
2. FEELING DOWN, DEPRESSED OR HOPELESS: 0
1. LITTLE INTEREST OR PLEASURE IN DOING THINGS: 0
SUM OF ALL RESPONSES TO PHQ QUESTIONS 1-9: 0

## 2021-01-01 ASSESSMENT — PAIN DESCRIPTION - FREQUENCY: FREQUENCY: CONTINUOUS

## 2021-06-01 PROBLEM — C20 RECTAL CANCER (HCC): Status: ACTIVE | Noted: 2021-01-01

## 2021-06-01 NOTE — PROGRESS NOTES
MA Rooming Questions  Patient: Gisselle Conrad  MRN: [id number]    Date: 6/1/2021        New patient      PHQ-9 Total Score: 0 (6/1/2021 10:59 AM)       PHQ-9 Given to (if applicable):               PHQ-9 Score (if applicable):                     [] Positive     []  Negative              Does question #9 need addressed (if applicable)                     [] Yes    []  No               Erling Sicard, CMA

## 2021-06-01 NOTE — TELEPHONE ENCOUNTER
Called patient regarding his CT scan on 06.15.2021 at [facility] arr 1230. Left prep instructions along with direct number for further questions.

## 2021-06-01 NOTE — PROGRESS NOTES
Patient Name:  Antonio Lane  Patient :  1945  Patient MRN:  [id number]     Primary Oncologist: Nav Gutierres MD  Referring Provider: Kem Robbins MD     Date of Service: 2021      Reason for Consult:  Rectal cancer     Chief Complaint:    Chief Complaint   Patient presents with   [facility] Patient       Encounter Diagnosis   Name Primary?  Rectal cancer [facility])         HPI:   21: Arrived with his wife to the clinic today he reported diarrhea for the past 6 weeks. One episode of dark blood in the stool. No constipation. Reported tenesmus. Reported suprapubic pain for the past 6 weeks usually occurs at night, lasting for 5 minutes and then resolve spontaneously. Denied any  symptoms. Appetite is okay but has lost about 25 pounds in the past 6 weeks and gained about few pounds. Denied any nausea or vomiting. Denied any chest pain, increase shortness of breath, palpitations or dizziness. 2021: EGD with normal esophagus, normal GE junction. Erosions in the mucosa was noted in the antrum. Duodenum normal.  Colonoscopy with an infiltrating fungating nonbleeding mass of malignant appearance in the rectum. Mass was about 8 cm from the anal verge. Was unable to pass the scope. 21:Final Pathologic Diagnosis:   A.  Rectum, biopsy:   -  INVASIVE MODERATELY DIFFERENTIATED ADENOCARCINOMA IS   IDENTIFIED (see comment, see stains report). Cori Anaya, antrum and body, biopsy:   -  Benign gastric mucosa containing minimal chronic   inflammation (see comment, see stains report). -  Helicobacter pylori microorganisms are not identified.       C.  Small bowel, second part duodenum, biopsy:   -  Benign duodenal mucosa containing no significant   histopathologic abnormality.    -  Normal villi are identified.       21: CT abdomen and pelvis:  Irregular mural thickening of the rectum, in keeping with the patient's known   history of rectal carcinoma.       There localized mesial rectal lymph nodes and implants, which are concerning   for localized spread of the disease.       No distant metastatic disease is seen within the abdomen pelvis. Past Medical History:     PAD, HTN, HLP, Insomnia                                                         Past Surgery History:    Bilateral carpal tunnel syn                                                                            Social History:   Lives with his wife. 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 Reported smoking 1 pack/day for 20 years both 35 years ago. Used to consume a lot of beer about 35 years ago. No other illicit drug abuse. Family History:    Brother was diagnosed with polyps which were removed he is not sure whether he was diagnosed with cancer. No Known Allergies    Current Outpatient Medications on File Prior to Visit   Medication Sig Dispense Refill    oxyCODONE (OXYCONTIN) 40 MG extended release tablet Take 40 mg by mouth every 12 hours.  traZODone (DESYREL) 100 MG tablet       simvastatin (ZOCOR) 40 MG tablet Take 40 mg by mouth nightly.  furosemide (LASIX) 20 MG tablet Take 20 mg by mouth daily.  atenolol (TENORMIN) 50 MG tablet Take 50 mg by mouth daily.  potassium chloride (KLOR-CON) 20 MEQ packet Take 20 mEq by mouth daily.  gabapentin (NEURONTIN) 300 MG capsule Take 300 mg by mouth nightly.  aspirin 81 MG chewable tablet Take 81 mg by mouth daily.  cilostazol (PLETAL) 50 MG tablet Take 1 tablet by mouth 2 times daily. 60 tablet 3     No current facility-administered medications on file prior to visit.         Review of Systems:    Constitutional:   No fever, No chills, No night sweats. Energy level fair  Eyes:  No diplopia, No transient or permanent loss of vision, No scotomata. ENT / Mouth:  No epistaxis, No dysphagia, No hoarseness, No oral ulcers, No gingival bleeding. No sore throat, No postnasal drip, No nasal drip, No mouth pain, No sinus pain, No tinnitus, Normal hearing. Cardiovascular:  No chest pain, No palpitations, No syncope, No upper extremity edema, No lower extremity edema, No calf discomfort. Respiratory:  No cough. No hemoptysis, No pleurisy, No wheezing, No dyspnea. Gastrointestinal: As per HPI  Urinary:  No dysuria, No hematuria, No urinary incontinence. Musculoskeletal:  No muscle pain, No swollen joints, No joint redness, No bone pain, No spine tenderness. Skin:  No rash, No nodules, No pruritus, No lesions. Neurologic:  No confusion, No seizures, No syncope, No tremor, No speech change, No headache, No hiccups, No abnormal gait, No sensory changes, No weakness. Psychiatric:  No depression, No anxiety, Concentration normal.  Endocrine:  No polyuria, No polydipsia, No hot flashes, No thyroid symptoms. Hematologic:  No epistaxis, No gingival bleeding, No petechiae, No ecchymosis. Lymphatic:  No lymphadenopathy, No lymphedema. Allergy / Immunologic:  No eczema, No frequent mucous infections, No frequent respiratory infections, No recurrent urticarial, No frequent skin infections.      Vital Signs: /61 (Site: Right Upper Arm, Position: Sitting, Cuff Size: Large Adult)   Pulse 91   Temp 96.9 °F (36.1 °C) (Infrared)   Resp 16   Ht 5' 11\" (1.803 m)   Wt 221 lb 3.2 oz (100.3 kg)   SpO2 97%   BMI 30.85 kg/m²      CONSTITUTIONAL: awake, alert, cooperative, no apparent distress   EYES: PEBBLES, Pallor+  ENT: ATNC  NECK: No JVD  HEMATOLOGIC/LYMPHATIC: no cervical, supraclavicular or axillary lymphadenopathy   LUNGS: CTAB  CARDIOVASCULAR: s1s2 rrr no murmurs  ABDOMEN: soft ntnd bs

## 2021-06-08 NOTE — TELEPHONE ENCOUNTER
Phoned pt and reviewed apt date/time for MRI and CT scan as well as first chemo treatment. Pt expressed understanding.

## 2021-06-10 NOTE — TELEPHONE ENCOUNTER
Called patient regarding the change in MRI, and gave him prep instruction. Also informed him of his treatment planning on 06.11.2021 at 1pm. Left direct number for further information.

## 2021-06-11 NOTE — TELEPHONE ENCOUNTER
Phoned pt to inquire about missed apt. Pt stated that he did not know about the apt and had not received a message. Pt stated that he went to have CT and MRI yesterday but they could not do the MRI because he had not had an enema. He stated that he did not know about the enema and had not received a message. Pt agreed to come for treatment planning on Monday and NN sent request for MRI to be rescheduled ASAP.

## 2021-06-14 NOTE — TELEPHONE ENCOUNTER
Called patient and left patient a message about his rescheduled MRI. It is today 06.14.2021, at [facility] arr 1430. Left fleet enema instructions and my direct number for questions.

## 2021-06-23 NOTE — TELEPHONE ENCOUNTER
Pt is going to the [facility] on 6/25 9:15 arrival for a 9:45 appt-- left message for pt with all preps and told to call with any questions

## 2021-06-24 NOTE — PROGRESS NOTES
Pt here for treatment planning. Discussed treatment plan, potential side effects, prevention, and management. Pt expressed understanding and signed consent. Pt given a copy of signed consent/treatment plan, chemo ed folder, and drug monograph. Pt does not have a port yet so will hold off on drawing labs until the day before his treatment. Verified apt for treatment start.

## 2021-06-24 NOTE — PROGRESS NOTES
Patient Name: Princess Escobar    Patient : 1945     Patient MRN: [id number]       Date: 2021                                                                                                   CHEMOTHERAPY TREATMENT PLAN    Care Team  Medical Oncologist: Anthony Amaya MD  Surgeon:   Radiation Oncologist:   Primary Care Physician: Vale Blanco MD     Learning Needs Assessment  Before the treatment plan was discussed and the consent was signed, the care team assessed the patient's learning needs. This includes their ability to assume responsibility for managing their therapy. Diagnosis      Rectal Cancer     The Goal of My Therapy is    (  ) To cure my cancer  (X) To shrink the tumor prior to surgery  (  ) Increase my chance of cure after surgery  (  ) Help me live as long as possible with the highest quality of life. I know that a cure is not possible. Prognosis    (X) Curable  (  ) Palliative    Plan Of Treatment    Intent:  (X) Neoadjuvant   (  ) Adjuvant   (  ) Control    Treatment Regimen  (including supportive meds)                             Frequency                                               Duration     Folfox      Expected Response to Treatment    ( X) This therapy could cure your disease  (  ) This therapy has a good chance of helping you live 1 year or more compared to without it  (  ) This therapy has a good chance of helping you feel better or making you live months longer compared to without it     Quality Of Life    (  ) Expect that you will be able to continue all normal activities  (x) Expect that you will have some side effects but should be able to maintain normal activities  (  ) Expect that you will be unable to work but able to perform light duties at home  (  ) Expect that you will be able to perform light duties and will need assistance with self care    Treatment Benefits and Harms     1.     Patient taught on all common and rare toxicities regarding their treatment, disease process and drug regimen. This includes the short and long-         term effects of therapy (including infertility risks when appropriate). This also includes drug-drug and drug-food interactions when appropriate. Patient was educated when to call [facility] with adverse effects and symptoms. 2.    Patient was taught safe handling and storage of medications in the home (when appropriate) and procedures for handling body sections and             waste in the home. 3.    Patient was taught on planned for missed doses and follow-up plans during treatment, including jeff of provider visits and labs. 4.    Patient signed consent on treatment and drug information sheets were given. Alternative To Recommended Treatment    (  ) Palliative or Hospice  (  ) Second Opinion  ( X Other    Patient Care Management     Advance Care Planning completed: Paperwork provided    (X) Yes   (  ) No   Pain Care Plan entered (as applicable):    ( )  Yes   (  ) No   Comments:  PHQ-9 completed (Follow-up plan as applicable):   (  ) Yes   (  ) No   Comments:  Distress needs addressed with the patient:    (X) Yes   (  ) No   Distress areas needing addressed further:     Patient was educated on the expectations and their responsibility to schedule their follow-up appointments. The patient was also educated that if they refuse to show-up to their appointment or refuse to schedule a follow-up appointment that it is their responsibility to call [facility] in a timely manner to schedule their next appointment. The patient was educated on [facility] policy and that the patient is ultimately responsible for monitoring their appointments and adhering to the schedule. (X) Yes   (   ) No    Estimated Out of Pocket Costs discussed per the patient per financial navigator. Patient Consented and taught per Nurse Navigator. .  Today, time spent with the patient discussing the intent and schedule of their treatment.  The patient was given a copy of their treatment summary. Questions and concerns were addressed with the patient. Time spent with the patient face to face today was 60 minutes, counseling and coordination of care dominated more than 50% of this patient encounter. Presented for treatment planning for FOLFOX. We discussed potential side effects including but not limited to: myelosuppression, mucositis, diarrhea, nausea and vomiting, allergic reaction, light sensitivity, visual changes, headache, sensitivity to set, swelling, hand-foot syndrome, neuropathy, cold sensitivity, fatigue, taste changes, and loss of appetite. She is asked to call our office for temperature 100.4 or greater. Olanzapine 5 mg HS for four nights starting the night before chemotherapy. She was also given 4 mg dexamethasone to take daily for four days starting the day after chemotherapy. He verbalized understanding and is willing to proceed with treatment. We will obtain labs today. We reviewed advance directives. He will bring in forms once completed. Counseling: declines    [place]: declines    Fatigue: encouraged adequate sleep, hydration, nutrition and exercise    Sleep: discussed sleep hygiene    Numbness/tingling - secondary to surgery for carpal tunnel with formation of scar tissue     Worry- declines referral for counseling    BP (!) 100/57 (Site: Left Upper Arm, Position: Sitting, Cuff Size: Medium Adult)   Pulse 90   Temp 96.1 °F (35.6 °C) (Infrared)   Resp 16   Ht 5' 11\" (1.803 m)   Wt 217 lb 12.8 oz (98.8 kg)   SpO2 95%   BMI 30.38 kg/m²     Physical Exam  Vitals reviewed. HENT:      Head: Normocephalic. Mouth/Throat:      Mouth: Mucous membranes are moist.   Eyes:      Extraocular Movements: Extraocular movements intact. Conjunctiva/sclera: Conjunctivae normal.   Cardiovascular:      Rate and Rhythm: Regular rhythm. Heart sounds: Normal heart sounds.    Pulmonary:      Effort: Pulmonary effort is normal.

## 2021-06-24 NOTE — PATIENT INSTRUCTIONS
Melatonin over the counter before bed    MEDICATION ORDERS FOR HIGHLY EMETOGENIC CHEMOTHERAPY      1.  Zofran - every 8 hours as needed for nausea or vomiting. 2.  Dexamethasone - take with breakfast for 4 days starting the day after each chemotherapy treatment. 3.  Olanzapine - Take one tablet for 4 nights in a row starting the the evening before each chemotherapy treatment.

## 2021-06-25 NOTE — TELEPHONE ENCOUNTER
On the phone with patient wife Dejon Barahona she states patient is ill and won't be able to make it to his PET scan. She is trying to get patient to the phone so he can tell me how he is feeling. Patient has had diarrhea all night he is keeping hydrated.

## 2021-07-13 NOTE — TELEPHONE ENCOUNTER
Attempted to call pt to check on status since he has missed several visits. Message left requesting return call.

## 2021-07-21 NOTE — PROGRESS NOTES
Patient Name:  Matheus Gandara  Patient :  1945  Patient MRN:  [id number]     Primary Oncologist: Michael Huff MD  Referring Provider: Malina Lyle MD     Date of Service: 2021      Reason for Consult:  Rectal cancer     Chief Complaint:    Chief Complaint   Patient presents with    Follow-up       Encounter Diagnoses   Name Primary?  Rectal cancer ([address] Yes    Malignant neoplasm of rectum ([facility] [address]         HPI:   21: Arrived with his wife to the clinic today he reported diarrhea for the past 6 weeks. One episode of dark blood in the stool. No constipation. Reported tenesmus. Reported suprapubic pain for the past 6 weeks usually occurs at night, lasting for 5 minutes and then resolve spontaneously. Denied any  symptoms. Appetite is okay but has lost about 25 pounds in the past 6 weeks and gained about few pounds. Denied any nausea or vomiting. Denied any chest pain, increase shortness of breath, palpitations or dizziness. 2021: EGD with normal esophagus, normal GE junction. Erosions in the mucosa was noted in the antrum. Duodenum normal.  Colonoscopy with an infiltrating fungating nonbleeding mass of malignant appearance in the rectum. Mass was about 8 cm from the anal verge. Was unable to pass the scope. 21:Final Pathologic Diagnosis:   A.  Rectum, biopsy:   -  INVASIVE MODERATELY DIFFERENTIATED ADENOCARCINOMA IS   IDENTIFIED (see comment, see stains report). Rhae Limerick, antrum and body, biopsy:   -  Benign gastric mucosa containing minimal chronic   inflammation (see comment, see stains report). -  Helicobacter pylori microorganisms are not identified.       C.  Small bowel, second part duodenum, biopsy:   -  Benign duodenal mucosa containing no significant   histopathologic abnormality.    -  Normal villi are identified.       21: CT abdomen and pelvis:  Irregular mural thickening of the rectum, in keeping with the patient's known   history of rectal carcinoma.       There localized mesial rectal lymph nodes and implants, which are concerning   for localized spread of the disease.       No distant metastatic disease is seen within the abdomen pelvis. 6/10/21: CT chest:  1. Scattered noncalcified solid pulmonary nodules the largest measuring up to   9 mm in left upper lobe.  No prior exams are available for comparison. Metastatic disease cannot be excluded. 2. No other acute cardiopulmonary findings. 6/18/21: MRI pelvis:  1. Approximate 4.6 cm mass of the rectum extending 1 cm beyond the muscularis   propria, T3c.   2. The MRI circumferential resection margin is approximately 10 mm as   described above. 3. Apparent noncontiguous extramural venous invasion as well as to suspicious   mesorectal lymph nodes, N1c. Past Medical History:     PAD, HTN, HLP, Insomnia                                                         Past Surgery History:    Bilateral carpal tunnel syn                                                                            Social History:   Lives with his wife. 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 Reported smoking 1 pack/day for 20 years both 35 years ago. Used to consume a lot of beer about 35 years ago. No other illicit drug abuse. Family History:    Brother was diagnosed with polyps which were removed he is not sure whether he was diagnosed with cancer.                                                                                          No Known Allergies    Current Outpatient Medications on File Prior to Visit   Medication Sig Dispense Refill    OLANZapine (ZYPREXA) 5 MG tablet One tablet po HS for four nights starting the night before chemotherapy 24 tablet 0  ondansetron (ZOFRAN) 8 MG tablet Take 1 tablet by mouth every 8 hours as needed for Nausea or Vomiting 30 tablet 1    dexamethasone (DECADRON) 4 MG tablet 1 tablet PO daily for 4 days starting the day after chemotherapy. 16 tablet 0    oxyCODONE (OXYCONTIN) 40 MG extended release tablet Take 40 mg by mouth every 12 hours.  traZODone (DESYREL) 100 MG tablet       simvastatin (ZOCOR) 40 MG tablet Take 40 mg by mouth nightly.  furosemide (LASIX) 20 MG tablet Take 20 mg by mouth daily.  atenolol (TENORMIN) 50 MG tablet Take 50 mg by mouth daily.  potassium chloride (KLOR-CON) 20 MEQ packet Take 20 mEq by mouth daily.  gabapentin (NEURONTIN) 300 MG capsule Take 300 mg by mouth nightly.  aspirin 81 MG chewable tablet Take 81 mg by mouth daily.  cilostazol (PLETAL) 50 MG tablet Take 1 tablet by mouth 2 times daily. 60 tablet 3     No current facility-administered medications on file prior to visit. Interval history: Arrived alone to the clinic today. Reported pain in the rectal area. Passing mostly gas. Very scanty loose stool. No abdominal pain. No nausea, emesis. No GI bleeding. Decent appetite. Lost 7 lbs since last visit. Fatigue. No chest pain, increased sob, palpitations or any dizziness. Review of Systems:  As per interval history, otherwise ros neg.       Vital Signs: BP (!) 104/56 (Site: Right Upper Arm, Position: Sitting, Cuff Size: Large Adult)   Pulse 72   Temp 97 °F (36.1 °C) (Temporal)   Resp 16   Ht 5' 11\" (1.803 m)   Wt 214 lb 9.6 oz (97.3 kg)   SpO2 94%   BMI 29.93 kg/m²      CONSTITUTIONAL: awake, alert, cooperative, tired appearing  EYES: PEBBLES, Pallor+  ENT: ATNC  NECK: No JVD  HEMATOLOGIC/LYMPHATIC: no cervical, supraclavicular or axillary lymphadenopathy   LUNGS: CTAB  CARDIOVASCULAR: s1s2 rrr no murmurs  ABDOMEN: soft ntnd bs pos  MUSCULOSKELETAL: full range of motion noted, tone is normal  NEUROLOGIC: GI  SKIN: No rash  EXTREMITIES: no LE small pulmonary nodules largest being 9 mm. No prior CT scans to compare. The plan was to obtain PET scan, consult with surgery(he did not wish to be referred to [place]), consult with radiation oncology for further treatment plan and in the meantime start chemotherapy with FOLFOX. But looks like he missed a lot of appointments. PET scan ordered . Will discuss with Dr. Rogelio Glover and Dr. Aroldo Johnson regarding further treatment plan. Rectal pain: Recommend Vicodin as needed    Continue other medical care. Discussed above findings and plan with him and he voiced understanding. Answered all his questions. Discussed healthy lifestyle including healthy diet, regular exercise as tolerated. Also discussed importance of being up-to-date with age-appropriate screening tools. Recommend follow-up with primary care physician and other specialists. Please do not hesitate to contact us if you need further information. Return to clinic 2 to 3 weeks or earlier if new Sx    I have recommended that the patient follow CDC guidelines for prevention of COVID-19 infection.     [address]    Electronically signed by Megan Guzman MD on 7/21/2021 at 4:04 PM

## 2021-07-22 NOTE — PROGRESS NOTES
Cassy Cho  7/22/2021    CONSULT     Vitals:    07/22/21 1038   BP: 94/66   Pulse: 86   Resp: 16   Temp: 96.9 °F (36.1 °C)   SpO2: 99%        Oxygen Therapy  SpO2: 99 %  Pulse Oximeter Device Mode: Continuous  Pulse Oximeter Device Location: Finger  O2 Device: None (Room air)  Skin Assessment: Clean, dry, & intact    Wt Readings from Last 3 Encounters:   07/22/21 214 lb (97.1 kg)   07/21/21 214 lb 9.6 oz (97.3 kg)   06/24/21 217 lb 12.8 oz (98.8 kg)        Pain Assessment  Pain Assessment: 0-10  Pain Level: 10  Patient's Stated Pain Goal: No pain  Pain Location: Rectum  Prescribed Narcotics    Fall Risk:    Not currently a fall risk  Taking Sedative/Antidepressant, Taking Blood Thinner, Assist with Transfer/Ambulation, Provide Wheelchair PRN, Educate on Assistive Devices and Re-Evaluate Weekly    Nutritional Alteration:  Diarrhea  No Difficulties Swallowing  Voices Sufficient Oral Intake    Mediport: no    Pacemaker/ICD: no    Implants: no    Previous XRT: no    Assessment: Pt here for consult with RAD to discuss [address] options, states he does have pain in rectum, is prescribed pain medication for it. Past Medical History:   Diagnosis Date    Hyperlipidemia     Hypertension        Past Surgical History:   Procedure Laterality Date    HAND SURGERY         No Known Allergies       Current Outpatient Medications:     HYDROcodone-acetaminophen (NORCO) 5-325 MG per tablet, Take 1 tablet by mouth every 8 hours as needed for Pain for up to 15 days. , Disp: 45 tablet, Rfl: 0    OLANZapine (ZYPREXA) 5 MG tablet, One tablet po HS for four nights starting the night before chemotherapy, Disp: 24 tablet, Rfl: 0    ondansetron (ZOFRAN) 8 MG tablet, Take 1 tablet by mouth every 8 hours as needed for Nausea or Vomiting, Disp: 30 tablet, Rfl: 1    dexamethasone (DECADRON) 4 MG tablet, 1 tablet PO daily for 4 days starting the day after chemotherapy. , Disp: 16 tablet, Rfl: 0    oxyCODONE (OXYCONTIN) 40 MG extended release tablet, Take 40 mg by mouth every 12 hours. , Disp: , Rfl:     traZODone (DESYREL) 100 MG tablet, , Disp: , Rfl:     simvastatin (ZOCOR) 40 MG tablet, Take 40 mg by mouth nightly., Disp: , Rfl:     furosemide (LASIX) 20 MG tablet, Take 20 mg by mouth daily. , Disp: , Rfl:     atenolol (TENORMIN) 50 MG tablet, Take 50 mg by mouth daily. , Disp: , Rfl:     potassium chloride (KLOR-CON) 20 MEQ packet, Take 20 mEq by mouth daily. , Disp: , Rfl:     gabapentin (NEURONTIN) 300 MG capsule, Take 300 mg by mouth nightly., Disp: , Rfl:     aspirin 81 MG chewable tablet, Take 81 mg by mouth daily. , Disp: , Rfl:     cilostazol (PLETAL) 50 MG tablet, Take 1 tablet by mouth 2 times daily. , Disp: 60 tablet, Rfl: 3        Electronically signed by Tino Moe CMA on 7/22/2021 at 10:42 AM

## 2021-07-22 NOTE — TELEPHONE ENCOUNTER
Called patient and left a message regarding his PET scan on 08.02.2021 at [address] [facility] [address]. Gave him my direct number  For prep and questions.

## 2021-07-22 NOTE — CONSULTS
Radiation Oncology Consultation  Encounter Date: 2021 4:17 PM    Mr. Dereck Brandon is a 66 y.o. male  : 1945  MRN: [id number]  [facility]  Requesting Provider: No att. providers found        CONSULTANT: Irene Durham MD    PHYSICIANS:   Primary Care: Leilani Painter MD   Medical Oncologist: Dhaval Plaza MD       DIAGNOSIS: Rectal cancer      Cancer Staging  Rectal cancer [facility])  Staging form: Colon And Rectum, AJCC 8th Edition  - Clinical: Stage IIIB (cT3, cN1c, cM0) - Signed by Irene Durham MD on 2021         TREATMENT COURSE:  Oncology History   Rectal cancer ([facility] [address]   2021 Initial Diagnosis    Rectal cancer ([facility] [address]           HPI:   Today I had the privilege of seeing Dereck Brandon in consultation. As you recall, Dereck Brandon is a 66 y.o. 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 He was previously in a fairly good state of health, however he reports noticing rectal pain for approximately 6 weeks prior to presentation with progression. He also reports noticing a decreased stool caliber approximate 1 month prior to presentation. He did have 2 episodes of bright red blood per rectum started in May. He also reports intermittent diarrhea. He reports it had been 10 years since his last colonoscopy. He also reports a 25 pound weight loss over  2 months. He denies any fevers, chills, or drenching night sweats. It is not noticed any new lumps or bumps anywhere throughout his body. He denies the new onset of bony pain but does report some chronic reticulocyte pain in his back neck and joints. Due to persistence of his symptoms, he presented for medical evaluation. On 21, he underwent EGD and colonoscopy. He was not noted to have a normal GE junction, normal mucosa and the whole esophagus and whole exam and duodenum. Erosions in the gastric mucosa were felt to be consistent with erosive gastritis.   He was noted to have a infiltrative, fungating, nonbleeding, partially obstructing, circumferential mass 8 cm from the anal verge beyond which he was unable to pass the scope. Biopsies were obtained revealing an invasive moderately differentiated adenocarcinoma. Antral biopsies showed benign gastric mucosa. Helicobacter pylori was not identified. The duodenal biopsies showed benign tissue. CT of the abdomen and pelvis from 5/25/21 showed an irregular mural thickening of the rectum with localized rectal lymph nodes and implants concerning for localized spread. No evidence of distant metastases was seen. Chest CT from 6/10/21 showed scattered noncalcified solid pulmonary nodules with the largest measuring up to 9 mm in the left upper lobe. No prior PET exams were available for comparison. Pelvic MRI from 6/18/21 showed a 4.6 cm rectal mass extending 1 cm beyond the muscularis propria cT3. The MRI circumferential resection margin was 10 mm. Apparent noncontiguous extramural venous invasion as well as suspicious mesorectal lymph nodes, N1c. PET/CT is in the process of being scheduled to better evaluate the pulmonary nodules. CEA from 6/1/21 was 4.2 ng per mL. He presents today for consideration of his treatment options.       Past Medical History:   Diagnosis Date    Hyperlipidemia     Hypertension         Past Surgical History:   Procedure Laterality Date    HAND SURGERY         Family History   Problem Relation Age of Onset    High Blood Pressure Father     Heart Disease Father     Stroke Sister        Social History     Socioeconomic History    Marital status:      Spouse name: Not on file    Number of children: Not on file    Years of education: Not on file    Highest education level: Not on file   Occupational History    Not on file   Tobacco Use    Smoking status: Former Smoker     Packs/day: 0.25     Years: 6.00     Pack years: 1.50     Start date: 1/1/1981     Quit date: 1986     Years since quitting: 35.5    Smokeless tobacco: Never Used   Substance and Sexual Activity    Alcohol use: No    Drug use: No    Sexual activity: Not on file   Other Topics Concern    Not on file   Social History Narrative    Not on file     Social Determinants of Health     Financial Resource Strain:     Difficulty of Paying Living Expenses:    Food Insecurity:     Worried About Running Out of Food in the Last Year:     [address] in the Last Year:    Transportation Needs:     Lack of Transportation (Medical):  Lack of Transportation (Non-Medical):    Physical Activity:     Days of Exercise per Week:     Minutes of Exercise per Session:    Stress:     Feeling of Stress :    Social Connections:     Frequency of Communication with Friends and Family:     Frequency of Social Gatherings with Friends and Family:     Attends Voodoo Services:     Active Member of Clubs or Organizations:     Attends Club or Organization Meetings:     Marital Status:    Intimate Partner Violence:     Fear of Current or Ex-Partner:     Emotionally Abused:     Physically Abused:     Sexually Abused: ALLERGIES: No Known Allergies     Current Outpatient Medications   Medication Sig Dispense Refill    HYDROcodone-acetaminophen (NORCO) 5-325 MG per tablet Take 1 tablet by mouth every 8 hours as needed for Pain for up to 15 days. 45 tablet 0    OLANZapine (ZYPREXA) 5 MG tablet One tablet po HS for four nights starting the night before chemotherapy 24 tablet 0    ondansetron (ZOFRAN) 8 MG tablet Take 1 tablet by mouth every 8 hours as needed for Nausea or Vomiting 30 tablet 1    dexamethasone (DECADRON) 4 MG tablet 1 tablet PO daily for 4 days starting the day after chemotherapy. 16 tablet 0    oxyCODONE (OXYCONTIN) 40 MG extended release tablet Take 40 mg by mouth every 12 hours.  traZODone (DESYREL) 100 MG tablet       simvastatin (ZOCOR) 40 MG tablet Take 40 mg by mouth nightly.       furosemide (LASIX) 20 MG low as reasonably  achievable. COMPARISON:  None    HISTORY:  ORDERING SYSTEM PROVIDED HISTORY: Rectal cancer [facility])  TECHNOLOGIST PROVIDED HISTORY:  Reason for Exam: Rectal cancer ([facility]  Acuity: Unknown  Type of Exam: Ongoing  Additional signs and symptoms: none    FINDINGS:  Mediastinum: No pathologically enlarged thoracic adenopathy. The heart size  within normal limits. No pericardial effusion. Thoracic aorta is normal in  caliber. The main pulmonary artery is normal in caliber. Mild to moderate  aortic atherosclerosis. Coronary arterial calcifications. Esophagus is  unremarkable. Lungs/pleura: There are scattered noncalcified solid pulmonary nodules the  largest measuring up to 9 mm in the left upper lobe (for example images 63,  61, 83). No focal consolidation, pleural effusion or pneumothorax. The  central airways are otherwise patent. Upper Abdomen: No acute findings in the upper abdomen. Soft Tissues/Bones: Bilateral gynecomastia. No acute findings in the bones  or soft tissues. Degenerative changes of the thoracic spine. Impression  1. Scattered noncalcified solid pulmonary nodules the largest measuring up to  9 mm in left upper lobe. No prior exams are available for comparison. Metastatic disease cannot be excluded. 2. No other acute cardiopulmonary findings. MRI PELVIS W WO CONTRAST    Narrative  EXAMINATION:  DOUBLE-CONTRAST RECTAL MRI WITHOUT AND WITH CONTRAST, 6/18/2021    TECHNIQUE:  Multiplanar multisequence MRI of the pelvis was performed without and with  the administration of intravenous contrast.    COMPARISON:  05/25/20211. HISTORY:  ORDERING SYSTEM PROVIDED HISTORY: Rectal cancer ([facility] [address]    Rectal adenocarcinoma 8 cm from the anal verge on colonoscopy. FINDINGS:  The distance of the lowest point of the rectal mass to the anal verge is  approximately 8.1 cm. The rectal mass measures 4.6 cm. The mass has a sessile  appearance with lobulated margins.   The mass encompasses the entire  circumference of the rectal wall. The mass appears to extends 1 cm beyond the  muscularis propria on series 9, image 15 consistent with a T3c lesion. The  MRI circumferential resection margin (the closest distance from the suspected  tumor to the mesorectal fascia is approximately 10 mm (series 9, image 12). There is an associated tubular, serpentine mildly T2 hyperintense,  heterogeneously enhancing projection that appears non contiguous with the  tumor that extends beyond the muscularis on series 8, image 16 and series 12,  image 43. There are two 5-6 mm mesorectal lymph nodes with 2-3 malignant  characteristics on series 9, images 17 and 19. Impression  1. Approximate 4.6 cm mass of the rectum extending 1 cm beyond the muscularis  propria, T3c.  2. The MRI circumferential resection margin is approximately 10 mm as  described above. 3. Apparent noncontiguous extramural venous invasion as well as to suspicious  mesorectal lymph nodes, N1c.      REVIEW OF SYSTEMS:   Constitutional:  Patient denies fevers, rigors, or drenching night sweats. Eyes:  The patient denies any recent visual changes, diplopia, or cataracts  ENMT:  The patient denies any ear pain, hearing changes, or nosebleeds  Respiratory:  The patient denies any SOB, hemoptysis, or green sputum production  Cardiovascular: The patient denies any chest pain, leg edema, or fainting spells  GI:  Patient denies nausea, vomiting. See HPI. : Patient denies dysuria, hematuria, or urinary incontinence.   Integumentary: patient denies skin rashes, pruritis, or arm edema  Endocrine:  Patient denies any diabetic or thyroid problems  Neurologic: Patient denies headaches, focal weakness, or disorientation    PHYSICAL EXAMINATION:   VITAL SIGNS: BP 94/66   Pulse 86   Temp 96.9 °F (36.1 °C) (Temporal)   Resp 16   Ht 5' 11\" (1.803 m)   Wt 214 lb (97.1 kg)   SpO2 99%   BMI 29.85 kg/m²   ECOG PERFORMANCE STATUS: 0  PAIN RATING: 0    GENERAL: Pleasant well-developed adult in no acute distress. Alert and oriented ×3  SKIN: Warm and dry, without jaundice, ecchymoses, or petechiae. HEENT: Normocephalic, atraumatic. PERRLA, Sclerae anicteric  NECK:  No JVD; Supple. No cervical, supraclavicular, or infraclavicular lymphadenopathy is palpable. THORAX: No palpable axillary adenopathy;  LUNGS: Bilaterally clear to auscultation. No rales, rhonci, or wheezing are present. Good inspiratory effort, no accessory muscle use. CARDIAC: Regular rate and rhythm, without murmurs, clicks, or gallops   ABDOMEN: Normoactive bowels sounds are present. Soft. Non-tender and non-distended. No hepatosplenomegaly or masses are present. Digital rectal examination: The sphincter tone is within normal limits. I was unable to reach all the way up to the mass. EXTREMITIES: No cyanosis, clubbing or edema is present. FROM  NEUROLOGIC: Gait unremarkable. The patient is alert and oriented. CN II-XII are grossly intact. Sensation to light touch is intact and symmetric in the fingers and feet. Muscle strength is 5/5 in both the upper and lower extremities. IMPRESSION/PLAN:  Buddy Lima is a 66 y.o. male who was recently found to have a [id number] adenocarcinoma the rectum with multiple pulmonary nodules. I have reviewed the patient's radiographic images. The treatment options were discussed in detail with the patient. I'll be watching for the upcoming PET/CT results with possible biopsy of the pulmonary nodule is positive. There is no evidence of pulmonary disease is detected, I do recommend concurrent neoadjuvant chemo- radiation therapy. The potential acute side effects and chronic complications of radiation therapy to the pelvis were discussed in detail with the patient. The patient voiced understanding, and the patient's questions were answered. The patient has decided to proceed with radiation therapy, pending pulmonary nodule workup.   I will be watching for the pulmonary nodule workup results prior to scheduling a simulation. Thank-you for allowing me to participate in the care of this very pleasant patient.           Electronically signed by Ita Bergeron MD on 7/22/2021 at 4:17 PM

## 2021-07-22 NOTE — PROGRESS NOTES
Spoke with patient in office regarding his PET scan on 08.02.2021 at [address]. Informed patient I will try to get a sooner appt date on Monday and I will speak to him on Monday about it.

## 2021-08-04 PROBLEM — R91.1 LUNG NODULE: Status: ACTIVE | Noted: 2021-01-01

## 2021-08-04 NOTE — PROGRESS NOTES
There localized mesial rectal lymph nodes and implants, which are concerning   for localized spread of the disease.       No distant metastatic disease is seen within the abdomen pelvis. 6/10/21: CT chest:  1. Scattered noncalcified solid pulmonary nodules the largest measuring up to   9 mm in left upper lobe.  No prior exams are available for comparison. Metastatic disease cannot be excluded. 2. No other acute cardiopulmonary findings. 6/18/21: MRI pelvis:  1. Approximate 4.6 cm mass of the rectum extending 1 cm beyond the muscularis   propria, T3c.   2. The MRI circumferential resection margin is approximately 10 mm as   described above. 3. Apparent noncontiguous extramural venous invasion as well as to suspicious   mesorectal lymph nodes, N1c. Then he missed several appointments for port placement and treatment as plan was to start FOLFOX in the meanwhile    8/2/21: PET:   1. Intensely hypermetabolic rectal mass is consistent with primary   malignancy.  Several prominent mesorectal lymph nodes with mild uptake are   likely metastatic as noted on recent MRI. 2. Dominant 9 mm pulmonary nodule in the posterior left upper lobe   demonstrates moderate uptake while additional smaller nodules demonstrate no   significant uptake but are below the threshold for accurate PET evaluation. Findings are suspicious for pulmonary metastatic disease. Past Medical History:     PAD, HTN, HLP, Insomnia                                                         Past Surgery History:    Bilateral carpal tunnel syn                                                                            Social History:   Lives with his wife. 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 Has 9 boys all settled in [place] to retired as a baker's as well. Reported smoking 1 pack/day for 20 years both 35 years ago.   Used to consume a lot of beer about 35 years ago. No other illicit drug abuse. Family History:    Brother was diagnosed with polyps which were removed he is not sure whether he was diagnosed with cancer. No Known Allergies    Current Outpatient Medications on File Prior to Visit   Medication Sig Dispense Refill    HYDROcodone-acetaminophen (NORCO) 5-325 MG per tablet Take 1 tablet by mouth every 8 hours as needed for Pain for up to 15 days. 45 tablet 0    OLANZapine (ZYPREXA) 5 MG tablet One tablet po HS for four nights starting the night before chemotherapy 24 tablet 0    ondansetron (ZOFRAN) 8 MG tablet Take 1 tablet by mouth every 8 hours as needed for Nausea or Vomiting 30 tablet 1    dexamethasone (DECADRON) 4 MG tablet 1 tablet PO daily for 4 days starting the day after chemotherapy. 16 tablet 0    oxyCODONE (OXYCONTIN) 40 MG extended release tablet Take 40 mg by mouth every 12 hours.  traZODone (DESYREL) 100 MG tablet       simvastatin (ZOCOR) 40 MG tablet Take 40 mg by mouth nightly.  furosemide (LASIX) 20 MG tablet Take 20 mg by mouth daily.  atenolol (TENORMIN) 50 MG tablet Take 50 mg by mouth daily.  potassium chloride (KLOR-CON) 20 MEQ packet Take 20 mEq by mouth daily.  gabapentin (NEURONTIN) 300 MG capsule Take 300 mg by mouth nightly.  aspirin 81 MG chewable tablet Take 81 mg by mouth daily.  cilostazol (PLETAL) 50 MG tablet Take 1 tablet by mouth 2 times daily. 60 tablet 3     No current facility-administered medications on file prior to visit. Interval history:8/4/21: Arrived alone to the clinic today. Reported pain in the rectal area. Passing mostly gas. Very scanty loose stool. No abdominal pain. No nausea, emesis. No GI bleeding. Appetite is now poor and has been loosing weight. Fatigue.  No chest pain, increased sob, palpitations or any dizziness. Review of Systems:  As per interval history, otherwise ros neg.       Vital Signs: /61 (Site: Right Upper Arm, Position: Sitting, Cuff Size: Medium Adult)   Pulse 64   Temp 97.3 °F (36.3 °C) (Infrared)   Ht 5' 11\" (1.803 m)   Wt 216 lb 6.4 oz (98.2 kg)   SpO2 98%   BMI 30.18 kg/m²      CONSTITUTIONAL: awake, alert, cooperative, tired appearing  EYES: PEBBLES, Pallor+  ENT: ATNC  NECK: No JVD  HEMATOLOGIC/LYMPHATIC: no cervical, supraclavicular or axillary lymphadenopathy   LUNGS: CTAB  CARDIOVASCULAR: s1s2 rrr no murmurs  ABDOMEN: soft ntnd bs pos  MUSCULOSKELETAL: full range of motion noted, tone is normal  NEUROLOGIC: GI  SKIN: No rash  EXTREMITIES: no LE edema bilaterally     Labs:  Hematology:  Lab Results   Component Value Date    WBC 4.5 07/21/2021    RBC 3.60 (L) 07/21/2021    HGB 11.2 (L) 07/21/2021    HCT 35.3 (L) 07/21/2021    MCV 98.1 07/21/2021    MCH 31.1 (H) 07/21/2021    MCHC 31.7 (L) 07/21/2021    RDW 14.3 07/21/2021     07/21/2021    MPV 9.2 07/21/2021    SEGSPCT 68.9 (H) 07/21/2021    EOSRELPCT 3.4 (H) 07/21/2021    BASOPCT 0.9 07/21/2021    LYMPHOPCT 16.2 (L) 07/21/2021    MONOPCT 10.6 (H) 07/21/2021    SEGSABS 3.1 07/21/2021    EOSABS 0.2 07/21/2021    BASOSABS 0.0 07/21/2021    LYMPHSABS 0.7 07/21/2021    MONOSABS 0.5 07/21/2021    DIFFTYPE AUTOMATED DIFFERENTIAL 07/21/2021     No results found for: ESR  Chemistry:  Lab Results   Component Value Date     07/21/2021    K 4.9 07/21/2021     07/21/2021    CO2 28 07/21/2021    BUN 17 07/21/2021    CREATININE 1.4 (H) 07/21/2021    GLUCOSE 84 07/21/2021    CALCIUM 8.6 07/21/2021    PROT 5.8 (L) 07/21/2021    LABALBU 3.6 07/21/2021    BILITOT 0.6 07/21/2021    ALKPHOS 75 07/21/2021    AST 14 (L) 07/21/2021    ALT 6 (L) 07/21/2021    LABGLOM 49 (L) 07/21/2021    GFRAA 59 (L) 07/21/2021     No results found for: MMA, LDH, HOMOCYSTEINE  No components found for: LD  Lab Results   Component Value Date    TSHHS 3.444 06/17/2011     Immunology:  Lab Results   Component Value Date    PROT 5.8 (L) 07/21/2021     No results found for: Hoodpastora Ho, KLFLCR  No results found for: B2M  Coagulation Panel:  Lab Results   Component Value Date    PROTIME 11.5 01/09/2014    INR 1.06 01/09/2014    APTT 24.7 01/09/2014     Anemia Panel:  No results found for: Grisel Fowler, FOLATE  Tumor Markers:  Lab Results   Component Value Date    CEA 3.5 07/21/2021    PSA 0.56 06/17/2011        Observations:  ECOG:  No data recorded       Assessment & Plan:                                                          Rectal cancer: Moderately Differentiated adenocarcinoma. 8 cm from the anal verge. MRI abdomen as above. CT chest with small pulmonary nodules largest being 9 mm. No prior CT scans to compare. The plan was to obtain PET scan, consult with surgery(he did not wish to be referred to [facility], [facility]), consult with radiation oncology for further treatment plan and in the meantime start chemotherapy with FOLFOX. But looks like he missed a lot of appointments and did not follow up  July 2021 CEA 3.5  8/2/21; PET with mod activity in the NADIA nodule. Recommend TT biopsy for further evaluation. Recommend PORT placement  And plan to start systemic treatment with FOLFOX and add recommend complete neoadjuvant chemotherapy followed by chemoradiation followed by surgery if no met disease, otherwise would like to obtain CARIS for further treatment planning. Rectal pain: Has been on Vicodin as needed. Adequate bowel regimen. Continue other medical care. Discussed above findings and plan with him and he voiced understanding. Answered all his questions. Discussed healthy lifestyle including healthy diet, regular exercise as tolerated. Also discussed importance of being up-to-date with age-appropriate screening tools.     Recommend follow-up with primary care physician and other specialists. Please do not hesitate to contact us if you need further information. Return to clinic 2-3 weeks or earlier if new Sx    I have recommended that the patient follow CDC guidelines for prevention of COVID-19 infection. He says he has received covid vaccine.      [address]    Electronically signed by Fredis Arambula MD on 8/4/2021 at 2:12 PM

## 2021-08-04 NOTE — PROGRESS NOTES
MA Rooming Questions  Patient: Vinicio Elizabeth  MRN: [id number]    Date: 8/4/2021        1. Do you have any new issues?   no         2. Do you need any refills on medications?    no    3. Have you had any imaging done since your last visit? yes - PET scan    4. Have you been hospitalized or seen in the emergency room since your last visit here?   no    5. Did the patient have a depression screening completed today?  No    No data recorded     PHQ-9 Given to (if applicable):               PHQ-9 Score (if applicable):                     [] Positive     []  Negative              Does question #9 need addressed (if applicable)                     [] Yes    []  No               Amando Barry CMA

## 2021-08-05 NOTE — TELEPHONE ENCOUNTER
Left message for patient to call me. [facility] [address] [facility], [address] is [phone] [facility] arr 0930. Nothing to eat after midnight for prep.

## 2021-08-10 NOTE — PROGRESS NOTES
PROCEDURE PERFORMED: Mediport placement    PRIMARY INDICATION FOR PROCEDURE: Chemotherapy  PT TRANSPORTED FROM:                           TO THE IR ROOM:        PT IN THE ROOM AT WHAT TIME:  1200                           INFORMED CONSENT:  PT alert and orientated signed consent with Dr. Krishna Barbosa. Consent placed in chart. ELISA SCORE PRE PROCEDURE:  10    NURSING ASSESSMENT: WNL    TIME OUT COMPLETE: 9382    BARRIER PRECAUTIONS & STERILE TECHNIQUE  Pt transferred to the table and positioned for comfort. Warm blankets given. Pt placed on Cardiac Monitor. Pt in the supine position. Pt prepped with chlorhexadine and draped in a sterile fashion.      PAIN/LOCAL ANESTHESIA/SEDATION MANAGEMENT:  Local,  Lidocaine  1 gram Ancef given by Watson Esrtada RN  1226  Sedation medications given by Watson Estrada RN    INTRAOPERATIVE:    ACCESS TIME: 1975  US/FLUORO: YES  WIRE USED:  SMART PORT  FINAL IMAGE TAKEN TO CONFIRM PLACEMENT OF: MEDIPORT  CONTRAST/CC: NA  FLUID RETURN: YES    STERILE DRESSINGS:  Applied by Fare[employer] LU    SPECIMENS: NA    EBL: <1cc    FOLLOW- UP X-RAY: NA    COMPLICATIONS: NA    STAFF PRESENT DURING PROCEDURE:  Dr Nancy West RT, Watson Estrada RN, Frank Boston RN, Student    ELISA SCORE POST PROCEDURE: 10    REPORT CALLED TO: Nirmala Holliday RN    PT LEFT ROOM AT WHAT TIME: 6663

## 2021-08-11 NOTE — PRE SEDATION
Sedation Pre-Procedure Note    Patient Name: Marli Trujillo   YOB: 1945  Room/Bed: Room/bed info not found  Medical Record Number: [id number]  Date: 8/11/2021   Time: 5:41 PM       Indication:  Port    Consent: I have discussed with the patient and/or the patient representative the indication, alternatives, and the possible risks and/or complications of the planned procedure and the anesthesia methods. The patient and/or patient representative appear to understand and agree to proceed. Vital Signs:   Vitals:    08/10/21 1436   BP: 134/66   Pulse: 69   Resp: 16   Temp:    SpO2: 96%       Past Medical History:   has a past medical history of Cancer ([facility] [address] Hyperlipidemia, and Hypertension. Past Surgical History:   has a past surgical history that includes Hand surgery and IR PORT PLACEMENT > 5 YEARS (8/10/2021). Medications:   Scheduled Meds:   Continuous Infusions:   PRN Meds:   Home Meds:   Prior to Admission medications    Medication Sig Start Date End Date Taking? Authorizing Provider   OLANZapine (ZYPREXA) 5 MG tablet One tablet po HS for four nights starting the night before chemotherapy 6/24/21  Yes RYAN Dill CNP   ondansetron [facility]) 8 MG tablet Take 1 tablet by mouth every 8 hours as needed for Nausea or Vomiting 6/24/21  Yes RYAN Dill CNP   traZODone (DESYREL) 100 MG tablet  5/20/21  Yes Historical Provider, MD   simvastatin (ZOCOR) 40 MG tablet Take 40 mg by mouth nightly. Yes Historical Provider, MD   furosemide (LASIX) 20 MG tablet Take 20 mg by mouth daily. Yes Historical Provider, MD   atenolol (TENORMIN) 50 MG tablet Take 50 mg by mouth daily. Yes Historical Provider, MD   potassium chloride (KLOR-CON) 20 MEQ packet Take 20 mEq by mouth daily. Yes Historical Provider, MD   gabapentin (NEURONTIN) 300 MG capsule Take 300 mg by mouth nightly. Yes Historical Provider, MD   aspirin 81 MG chewable tablet Take 81 mg by mouth daily.    Yes Historical MD Eva   dexamethasone (DECADRON) 4 MG tablet 1 tablet PO daily for 4 days starting the day after chemotherapy. 6/24/21   RYAN Dash - CNP   cilostazol (PLETAL) 50 MG tablet Take 1 tablet by mouth 2 times daily. 1/13/14   Kevin Haile MD     Coumadin Use Last 7 Days:  no  Antiplatelet drug therapy use last 7 days: no  Other anticoagulant use last 7 days: no  Additional Medication Information:  none      Pre-Sedation Documentation and Exam:   I have personally completed a history, physical exam & review of systems for this patient (see notes).     Mallampati Airway Assessment:  Mallampati Class II - (soft palate, fauces & uvula are visible)    Prior History of Anesthesia Complications:   none    ASA Classification:  Class 3 - A patient with severe systemic disease that limits activity but is not incapacitating    Sedation/ Anesthesia Plan:   intravenous sedation    Medications Planned:   midazolam (Versed) intravenously    Patient is an appropriate candidate for plan of sedation: yes    Electronically signed by Feliberto Chandler MD on 8/11/2021 at 5:41 PM

## 2021-08-17 NOTE — PROGRESS NOTES
X ray informed of CXR ordered for 1100 and 1200. Dressing continues to be dry and intact; no crepitus nor shortness of breath.

## 2021-08-17 NOTE — PRE SEDATION
Take 300 mg by mouth nightly. Historical Provider, MD   aspirin 81 MG chewable tablet Take 81 mg by mouth daily. Historical Provider, MD   cilostazol (PLETAL) 50 MG tablet Take 1 tablet by mouth 2 times daily. 1/13/14   Laz Simental MD     Coumadin Use Last 7 Days:  no  Antiplatelet drug therapy use last 7 days: no  Other anticoagulant use last 7 days: no  Additional Medication Information:  none      Pre-Sedation Documentation and Exam:   Vital signs have been reviewed (see flow sheet for vitals).     Mallampati Airway Assessment:  normal neck range of motion, Mallampati Class II - (soft palate, fauces & uvula are visible)    Prior History of Anesthesia Complications:   none    ASA Classification:  Class 2 - A normal healthy patient with mild systemic disease    Sedation/ Anesthesia Plan:   intravenous sedation    Medications Planned:   midazolam (Versed) intravenously and fentanyl intravenously    Patient is an appropriate candidate for plan of sedation: yes    Electronically signed by Romina Daigle MD on 8/17/2021 at 2:59 PM

## 2021-08-17 NOTE — H&P
Date:2021  Name:Jayjay Cuellar   :1945   VY#:[id number]    SEX:male   Referring Physician:  Girish Bowling  Primary Physician:  Erica Canela  Chief Complaint:  Lung nodule  History of Present Illness:   Lung nodule    HISTORY AND PHYSICAL  No admission diagnoses are documented for this encounter. Past Medical History:  Past Medical History:   Diagnosis Date    Cancer ([address]     rectal cancer    Hyperlipidemia     Hypertension        Past Surgical History:  Past Surgical History:   Procedure Laterality Date    CT NEEDLE BIOPSY LUNG PERCUTANEOUS  2021    CT NEEDLE BIOPSY LUNG PERCUTANEOUS 2021 [facility] CT SCAN    HAND SURGERY      IR PORT PLACEMENT EQUAL OR GREATER THAN 5 YEARS  8/10/2021    IR PORT PLACEMENT EQUAL OR GREATER THAN 5 YEARS 8/10/2021 [facility] SPECIAL PROCEDURES       Social History:  Social History     Socioeconomic History    Marital status:      Spouse name: Not on file    Number of children: Not on file    Years of education: Not on file    Highest education level: Not on file   Occupational History    Not on file   Tobacco Use    Smoking status: Former Smoker     Packs/day: 0.25     Years: 6.00     Pack years: 1.50     Start date: 1981     Quit date:      Years since quittin.6    Smokeless tobacco: Never Used   Substance and Sexual Activity    Alcohol use: No    Drug use: No    Sexual activity: Not on file   Other Topics Concern    Not on file   Social History Narrative    Not on file     Social Determinants of Health     Financial Resource Strain:     Difficulty of Paying Living Expenses:    Food Insecurity:     Worried About [address] in the Last Year:     [address] in the Last Year:    Transportation Needs:     Lack of Transportation (Medical):      Lack of Transportation (Non-Medical):    Physical Activity:     Days of Exercise per Week:     Minutes of Exercise per Session:    Stress:     Feeling of Stress :    Social Connections:     Frequency of Communication with Friends and Family:     Frequency of Social Gatherings with Friends and Family:     Attends Worship Services:     Active Member of Clubs or Organizations:     Attends Club or Organization Meetings:     Marital Status:    Intimate Partner Violence:     Fear of Current or Ex-Partner:     Emotionally Abused:     Physically Abused:     Sexually Abused:        Family History:  Family History   Problem Relation Age of Onset    High Blood Pressure Father     Heart Disease Father     Stroke Sister        Allergies:  No Known Allergies    Medications:  Current Outpatient Medications on File Prior to Encounter   Medication Sig Dispense Refill    OLANZapine (ZYPREXA) 5 MG tablet One tablet po HS for four nights starting the night before chemotherapy 24 tablet 0    ondansetron (ZOFRAN) 8 MG tablet Take 1 tablet by mouth every 8 hours as needed for Nausea or Vomiting 30 tablet 1    dexamethasone (DECADRON) 4 MG tablet 1 tablet PO daily for 4 days starting the day after chemotherapy. 16 tablet 0    traZODone (DESYREL) 100 MG tablet       simvastatin (ZOCOR) 40 MG tablet Take 40 mg by mouth nightly.  furosemide (LASIX) 20 MG tablet Take 20 mg by mouth daily.  atenolol (TENORMIN) 50 MG tablet Take 50 mg by mouth daily.  potassium chloride (KLOR-CON) 20 MEQ packet Take 20 mEq by mouth daily.  gabapentin (NEURONTIN) 300 MG capsule Take 300 mg by mouth nightly.  aspirin 81 MG chewable tablet Take 81 mg by mouth daily.  cilostazol (PLETAL) 50 MG tablet Take 1 tablet by mouth 2 times daily. 60 tablet 3     Current Facility-Administered Medications on File Prior to Encounter   Medication Dose Route Frequency Provider Last Rate Last Admin    sodium chloride flush 0.9 % injection 10 mL  10 mL Intravenous PRN Binta Santos MD           ROS: No fevers or chills    Vital Signs:     There is no height or weight on file to calculate BMI.    Laboratory:  Recent Labs     08/17/21  1140   WBC 4.7   *      CO2 30   BUN 14   CREATININE 1.2   GLUCOSE 86     INR @LABR24(INR)@    Physical Exam:  GENERAL:Well developed, well nourished in NAD  NECK: Neck exam - No JVD,HJR or carotid bruit, no thyromegaly   RESPIRATORY:Clear to auscultation  HEART:RRR,no murmer, gallop or friction rub  ABDOMEN: Bowel sounds present, no tenderness to palpation. No organomegaly  EXTREMITIES: no edema or cyanosis  VASCULAR:  no ischemic changes  SKIN: no erythema, rubor or lesions noted  NEURO/PSYCH:Alert and oriented    EKG:    Imaging:    Chest: CTA    Heart: S1, S1    Impression:  Active Problems:    * No active hospital problems. *  Resolved Problems:    * No resolved hospital problems.  *      Lung nodule    Mallampati Score 2  ASA class 2    PLAN OF CARE/PLANNED PROCEDURE  Ct lung biopsy    XR CHEST PORTABLE [[id number]]

## 2021-08-17 NOTE — PROGRESS NOTES
PROCEDURE PERFORMED: Left Lung Biopsy    PRIMARY INDICATION FOR PROCEDURE:  Lung Nodule    PT TRANSPORTED FROM: [facility] 14                       TO THE IR ROOM:    CT rm 2    PT IN THE ROOM AT WHAT TIME:     0840    INFORMED CONSENT:  PT A/O signed consent with Dr. Connie Decker. Consent placed in chart. Pt verbalized understanding of procedure. ELISA SCORE PRE PROCEDURE:  10    NURSING ASSESSMENT:  Pt A/O. Follows commands, ambulated independently, mediport in right chest, glued and healing. TIME OUT COMPLETE: 0902    BARRIER PRECAUTIONS & STERILE TECHNIQUE  Pt transferred to the table and positioned for comfort. Warm blankets given. Pt placed on Cardiac Monitor. Pt in the supine position. Chlorhexadine and draped in a sterile fashion.      PAIN/LOCAL ANESTHESIA/SEDATION MANAGEMENT:  Lidocaine 1% without Epinephrine, Fentanyl and Midazolam    0903 fentanyl 25 mcg and 0.5mg versed given by Hernando Parrish RN    INTRAOPERATIVE:   ACCESS TIME: 0904  Unable to access nodule,   Pt repositioned and reaccessed at 0925  CT Imaging   [facility] 20 g x 16 cm biopsy set  FINAL IMAGE TAKEN TO CONFIRM PLACEMENT OF:       STERILE DRESSINGS:  Bandaid    SPECIMENS: Cores # 7 Sent with cytology    EBL: >10 ml    FOLLOW- UP X-RAY: ordered    COMPLICATIONS: none    STAFF PRESENT DURING PROCEDURE: Dr Yunior Carrasquillo, RN, Hernando Parrish, RICO, Fernando Manzo RN, RICO Simmons,  Tanisha, RT     ELISA SCORE POST PROCEDURE:     REPORT CALLED TO: Cassandra Severance in [facility]: 1003

## 2021-08-17 NOTE — TELEPHONE ENCOUNTER
Phoned Cath lab to verify that pt had labs drawn. Phoned lab to inquire about stat lab results that have not been ran yet.

## 2021-08-17 NOTE — PROGRESS NOTES
Discharge instructions reviewed with patient. Voices understanding. Ambulated without difficulty. Iv removed. Bed bug found on the patient's gown. Patient discharged home with wife. Left rib/chest dressing dry and intact.

## 2021-08-18 NOTE — TELEPHONE ENCOUNTER
WIFE CALLED AFTER HOURS INQUIRING ABOUT A PHONE CALL SHE RECEIVED ABOUT AN APPT FOR INFUSION. AFTER INVESTIGATING, PT HAS AN APPT WITH [facility] TODAY AT 8;15. I LEFT A MESSAGE INFORMING THE ABOVE.

## 2021-08-20 NOTE — PROGRESS NOTES
MA Rooming Questions  Patient: Matheus Gandara  MRN: [id number]    Date: 8/20/2021        1. Do you have any new issues? yes - wanting something for pain in rectum         2. Do you need any refills on medications?    no    3. Have you had any imaging done since your last visit? yes -     4. Have you been hospitalized or seen in the emergency room since your last visit here? 5. Did the patient have a depression screening completed today?  No    No data recorded     PHQ-9 Given to (if applicable):               PHQ-9 Score (if applicable):                     [] Positive     []  Negative              Does question #9 need addressed (if applicable)                     [] Yes    []  No               Miguel Evans CMA

## 2021-08-20 NOTE — PROGRESS NOTES
Patient Name:  Nancy Servin  Patient :  1945  Patient MRN:  [id number]     Primary Oncologist: Evelina Borden MD  Referring Provider: Becky Dexter MD     Date of Service: 2021      Reason for Consult:  Rectal cancer     Chief Complaint:    Chief Complaint   Patient presents with    Follow-up       Encounter Diagnosis   Name Primary?  Rectal cancer ([address] Yes        HPI:   21: Arrived with his wife to the clinic today he reported diarrhea for the past 6 weeks. One episode of dark blood in the stool. No constipation. Reported tenesmus. Reported suprapubic pain for the past 6 weeks usually occurs at night, lasting for 5 minutes and then resolve spontaneously. Denied any  symptoms. Appetite is okay but has lost about 25 pounds in the past 6 weeks and gained about few pounds. Denied any nausea or vomiting. Denied any chest pain, increase shortness of breath, palpitations or dizziness. 2021: EGD with normal esophagus, normal GE junction. Erosions in the mucosa was noted in the antrum. Duodenum normal.  Colonoscopy with an infiltrating fungating nonbleeding mass of malignant appearance in the rectum. Mass was about 8 cm from the anal verge. Was unable to pass the scope. 21:Final Pathologic Diagnosis:   A.  Rectum, biopsy:   -  INVASIVE MODERATELY DIFFERENTIATED ADENOCARCINOMA IS   IDENTIFIED (see comment, see stains report). Aimee Bach, antrum and body, biopsy:   -  Benign gastric mucosa containing minimal chronic   inflammation (see comment, see stains report). -  Helicobacter pylori microorganisms are not identified.       C.  Small bowel, second part duodenum, biopsy:   -  Benign duodenal mucosa containing no significant   histopathologic abnormality.    -  Normal villi are identified.       21: CT abdomen and pelvis:  Irregular mural thickening of the rectum, in keeping with the patient's known   history of rectal carcinoma.       There localized mesial rectal lymph nodes and implants, which are concerning   for localized spread of the disease.       No distant metastatic disease is seen within the abdomen pelvis. 6/10/21: CT chest:  1. Scattered noncalcified solid pulmonary nodules the largest measuring up to   9 mm in left upper lobe.  No prior exams are available for comparison. Metastatic disease cannot be excluded. 2. No other acute cardiopulmonary findings. 6/18/21: MRI pelvis:  1. Approximate 4.6 cm mass of the rectum extending 1 cm beyond the muscularis   propria, T3c.   2. The MRI circumferential resection margin is approximately 10 mm as   described above. 3. Apparent noncontiguous extramural venous invasion as well as to suspicious   mesorectal lymph nodes, N1c. Then he missed several appointments for port placement and treatment as plan was to start FOLFOX in the meanwhile    8/2/21: PET:   1. Intensely hypermetabolic rectal mass is consistent with primary   malignancy.  Several prominent mesorectal lymph nodes with mild uptake are   likely metastatic as noted on recent MRI. 2. Dominant 9 mm pulmonary nodule in the posterior left upper lobe   demonstrates moderate uptake while additional smaller nodules demonstrate no   significant uptake but are below the threshold for accurate PET evaluation. Findings are suspicious for pulmonary metastatic disease. 8/17/21:  Final Pathologic Diagnosis:   Left lung upper lobe mass, needle core biopsy:        -     NON-SMALL CELL CARCINOMA, MOST CONSISTENT WITH   LUNG ADENOCARCINOMA. CK AE1/AE3: Positive in the tumor cells. CK7: Positive in the tumor cells. TTF-1: Positive in the tumor cells. Napsin-A: Positive in the tumor cells. CK 5/6: Shows staining in rare tumor cells. P40: Negative in the tumor cells.      Past Medical History:     PAD, HTN, HLP, Insomnia                                                         Past Surgery History:    Bilateral carpal tunnel syn Social History:   Lives with his wife. 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 Retired as a baker. Has 9 boys all settled in [place] to retired as a baker's as well. Reported smoking 1 pack/day for 20 years both 35 years ago. Used to consume a lot of beer about 35 years ago. No other illicit drug abuse. Family History:    Brother was diagnosed with polyps which were removed he is not sure whether he was diagnosed with cancer. No Known Allergies    Current Outpatient Medications on File Prior to Visit   Medication Sig Dispense Refill    pravastatin (PRAVACHOL) 40 MG tablet       OLANZapine (ZYPREXA) 5 MG tablet One tablet po HS for four nights starting the night before chemotherapy 24 tablet 0    ondansetron (ZOFRAN) 8 MG tablet Take 1 tablet by mouth every 8 hours as needed for Nausea or Vomiting 30 tablet 1    dexamethasone (DECADRON) 4 MG tablet 1 tablet PO daily for 4 days starting the day after chemotherapy. 16 tablet 0    traZODone (DESYREL) 100 MG tablet       simvastatin (ZOCOR) 40 MG tablet Take 40 mg by mouth nightly.  furosemide (LASIX) 20 MG tablet Take 20 mg by mouth daily.  atenolol (TENORMIN) 50 MG tablet Take 50 mg by mouth daily.  potassium chloride (KLOR-CON) 20 MEQ packet Take 20 mEq by mouth daily.  gabapentin (NEURONTIN) 300 MG capsule Take 300 mg by mouth nightly.  aspirin 81 MG chewable tablet Take 81 mg by mouth daily.  cilostazol (PLETAL) 50 MG tablet Take 1 tablet by mouth 2 times daily. 60 tablet 3     No current facility-administered medications on file prior to visit. Interval history:8/20/21: Arrived with his wife to the clinic today. Reported pain in the rectal area worsening in the past few days. Loose stools but mostly passing gas. No nausea or any emesis. Lower abdominal pain. No GI bleeding. Appetite is poor and has lost 35 lbs all together. Feels tired all the time. No chest pain, increased sob, palpitations. Lightheaded when he is pain. Review of Systems:  As per interval history, otherwise ros neg. Vital Signs: BP (!) 86/51 (Site: Right Upper Arm, Position: Sitting, Cuff Size: Medium Adult)   Pulse 73   Temp 97.3 °F (36.3 °C) (Temporal)   Resp 16   Ht 5' 10.5\" (1.791 m)   Wt 203 lb 12.8 oz (92.4 kg)   SpO2 96%   BMI 28.83 kg/m²      CONSTITUTIONAL: awake, alert, cooperative, tired appearing, Poor hygiene.    EYES: PEBBLES, Pallor+  ENT: ATNC  NECK: No JVD  HEMATOLOGIC/LYMPHATIC: no cervical, supraclavicular or axillary lymphadenopathy   LUNGS: CTAB  CARDIOVASCULAR: s1s2 rrr no murmurs  ABDOMEN: soft ntnd bs pos  MUSCULOSKELETAL: full range of motion noted, tone is normal  NEUROLOGIC: GI  SKIN: No rash  EXTREMITIES: no LE edema bilaterally     Labs:  Hematology:  Lab Results   Component Value Date    WBC 4.7 08/17/2021    RBC 3.49 (L) 08/17/2021    HGB 10.9 (L) 08/17/2021    HCT 36.2 (L) 08/17/2021    .7 (H) 08/17/2021    MCH 31.2 (H) 08/17/2021    MCHC 30.1 (L) 08/17/2021    RDW 14.3 08/17/2021     08/17/2021    MPV 10.0 08/17/2021    SEGSPCT 51.2 08/17/2021    EOSRELPCT 4.5 (H) 08/17/2021    BASOPCT 1.1 (H) 08/17/2021    LYMPHOPCT 31.9 08/17/2021    MONOPCT 9.4 (H) 08/17/2021    SEGSABS 2.4 08/17/2021    EOSABS 0.2 08/17/2021    BASOSABS 0.1 08/17/2021    LYMPHSABS 1.5 08/17/2021    MONOSABS 0.4 08/17/2021    DIFFTYPE AUTOMATED DIFFERENTIAL 08/17/2021     No results found for: ESR  Chemistry:  Lab Results   Component Value Date     (L) 08/17/2021    K 4.3 08/17/2021     08/17/2021    CO2 30 08/17/2021    BUN 14 08/17/2021    CREATININE 1.2 08/17/2021    GLUCOSE 86 08/17/2021    CALCIUM 8.2 (L) 08/17/2021    PROT 5.7 (L) 08/17/2021    LABALBU 3.4 08/17/2021    BILITOT 0.4 08/17/2021    ALKPHOS 70 08/17/2021    AST 11 (L) 08/17/2021    ALT 5 (L) 08/17/2021    LABGLOM 59 (L) 08/17/2021    GFRAA >60 08/17/2021    MG 2.0 08/17/2021     No results found for: MMA, LDH, HOMOCYSTEINE  No components found for: LD  Lab Results   Component Value Date    TSHHS 3.444 06/17/2011     Immunology:  Lab Results   Component Value Date    PROT 5.7 (L) 08/17/2021     No results found for: DONELL FloresLCR  No results found for: B2M  Coagulation Panel:  Lab Results   Component Value Date    PROTIME 13.8 08/10/2021    INR 1.07 08/10/2021    APTT 31.2 08/10/2021     Anemia Panel:  No results found for: SRRBEIHG42, FOLATE  Tumor Markers:  Lab Results   Component Value Date    CEA 3.5 07/21/2021    PSA 0.56 06/17/2011        Observations:  ECOG:  No data recorded       Assessment & Plan:                                                          Rectal cancer: Moderately Differentiated adenocarcinoma. 8 cm from the anal verge. MRI abdomen as above. CT chest with small pulmonary nodules largest being 9 mm. No prior CT scans to compare. The plan was to obtain PET scan, consult with surgery(he did not wish to be referred to [place]), consult with radiation oncology for further treatment plan and in the meantime start chemotherapy with FOLFOX. But looks like he missed a lot of appointments and did not follow up  July 2021 CEA 3.5  8/2/21; PET with mod activity in the NADIA nodule. Intense hypermetabolic rectal mass and several prominent mesorectal LN with mild activity  Lung biopsy consistent with NSCLC, [place]. Discussed with pathology  PORT. OCM completed long term ago. Recommend complete neoadjuvant chemotherapy followed by chemoradiation followed by surgery along with SRS but will present the case at TB. NADIA adeno ca: Recommend SRS. Will present the case in the TB     Rectal pain: Has been on Vicodin as needed. Adequate bowel regimen. Macrocytic anemia: R/O b12 def. Improve nutritional status, recommend Boost and MVI. Continue other medical care. Discussed above findings and plan with him and he voiced understanding. Answered all his questions. Discussed healthy lifestyle including healthy diet, regular exercise as tolerated. Also discussed importance of being up-to-date with age-appropriate screening tools. Recommend follow-up with primary care physician and other specialists. Please do not hesitate to contact us if you need further information. Return to clinic 2-3 weeks or earlier if new Sx    I have recommended that the patient follow CDC guidelines for prevention of COVID-19 infection. He says he has received covid vaccine.      [address]    Electronically signed by Michael Huff MD on 8/20/2021 at 12:15 PM

## 2021-09-01 PROBLEM — C34.12 PRIMARY ADENOCARCINOMA OF UPPER LOBE OF LEFT LUNG (HCC): Status: ACTIVE | Noted: 2021-01-01

## 2021-09-01 NOTE — PROGRESS NOTES
Claudia Kiser  9/1/2021    Patient is seen today for follow up. Vitals:    09/01/21 1434   BP: (!) 108/55   Pulse: 61   Resp: 16   Temp: 97.3 °F (36.3 °C)   SpO2: 96%        Oxygen Therapy  SpO2: 96 %  Pulse Oximeter Device Location: Finger, Right  O2 Device: None (Room air)  Skin Assessment: Clean, dry, & intact    Wt Readings from Last 3 Encounters:   09/01/21 203 lb 12.8 oz (92.4 kg)   08/20/21 203 lb 12.8 oz (92.4 kg)   08/17/21 218 lb (98.9 kg)       Pain Assessment  Pain Level: 7  Pain Location: Rectum  OTC Analgesics     No Known Allergies     Current Outpatient Medications   Medication Sig Dispense Refill    clorazepate (TRANXENE) 7.5 MG tablet TAKE 1 TABLET BY MOUTH ONCE DAILY      Lactobacillus (ACIDOPHILUS PROBIOTIC FORMULA) TABS TAKE 1 TABLET BY MOUTH ONCE DAILY      pravastatin (PRAVACHOL) 40 MG tablet       OLANZapine (ZYPREXA) 5 MG tablet One tablet po HS for four nights starting the night before chemotherapy 24 tablet 0    ondansetron (ZOFRAN) 8 MG tablet Take 1 tablet by mouth every 8 hours as needed for Nausea or Vomiting 30 tablet 1    dexamethasone (DECADRON) 4 MG tablet 1 tablet PO daily for 4 days starting the day after chemotherapy. 16 tablet 0    traZODone (DESYREL) 100 MG tablet       simvastatin (ZOCOR) 40 MG tablet Take 40 mg by mouth nightly.  furosemide (LASIX) 20 MG tablet Take 20 mg by mouth daily.  atenolol (TENORMIN) 50 MG tablet Take 50 mg by mouth daily.  potassium chloride (KLOR-CON) 20 MEQ packet Take 20 mEq by mouth daily.  gabapentin (NEURONTIN) 300 MG capsule Take 300 mg by mouth nightly.  aspirin 81 MG chewable tablet Take 81 mg by mouth daily.  cilostazol (PLETAL) 50 MG tablet Take 1 tablet by mouth 2 times daily. 60 tablet 3     No current facility-administered medications for this encounter. Additional Comments: Patient states he is always in a lot of pain.  He has bowel movements 3-4 times a day and the more he goes the more pain he is in. He states he was taking ibuprofen but was told to stop taking it and hasn't been given anything in its place and would like to have something.  He is also wondering when he will start his actual treatment instead of the continuation of running test.     Electronically signed by Abiel Gary CMA on 9/1/2021 at 2:36 PM

## 2021-09-01 NOTE — CONSULTS
Radiation Oncology Consultation  Encounter Date: 2021 2:45 PM    Mr. Elijah Munoz is a 64 y.o. male  : 1945  MRN: [id number]  [facility]: [de-identified]  Requesting Provider: No att. providers found        CONSULTANT: Melquiades Wilkins MD    PHYSICIANS:   Primary Care: MD Danitza Tuttle M.D. DIAGNOSIS: Adenocarcinoma left upper lobe and patient with also recently diagnosed rectal cancer      Cancer Staging  Primary adenocarcinoma of upper lobe of left lung ([address]  Staging form: Lung, AJCC 8th Edition  - Clinical: Stage IA1 (cT1a, cN0, cM0) - Signed by Melquiades Wilkins MD on 2021    Rectal cancer [facility])  Staging form: Colon And Rectum, AJCC 8th Edition  - Clinical: Stage IIIB (cT3, cN1c, cM0) - Signed by Melquiades Wilkins MD on 2021         TREATMENT COURSE:  Oncology History   Rectal cancer ([address]   2021 Initial Diagnosis    Rectal cancer ([address]     Primary adenocarcinoma of upper lobe of left lung ([address]   2021 Initial Diagnosis    Primary adenocarcinoma of upper lobe of left lung ([address]           HPI:   Today I had the privilege of seeing Elijah Munoz in consultation. As you recall, Elijah Munoz is a 64 y.o. male who was recently found to have non-small cell lung cancer of the left upper lobe after having also recently been found to have rectal cancer. On work-up of his rectal cancer, he underwent a chest CT on 6/10/2021 at which time he was noted to have scattered noncalcified pulmonary nodules with the largest measuring 9 mm in the left upper lobe. Follow-up PET CT from 21 showed the 9 mm posterior left upper lobe nodule to have moderate uptake with an SUV of 3.1. Adjacent smaller nodule showed no significant uptake. Intensely hypermetabolic activity in the rectal mass was also noted. On 2021, a CT-guided needle biopsy of the left upper lobe mass was performed revealing adenocarcinoma consistent with a pulmonary primary.   He presents today for consideration of his treatment options. He denies any acute respiratory symptomatology. He reports he has not been experiencing any fevers, chills, or drenching night sweats. He denies any chest pain, shortness of breath, hemoptysis, cough, or green sputum production. His weight and appetite have been stable. He denies noticing any new lumps or bumps anywhere else throughout his body. He denies new onset of bony pain. He does report his rectal pain increasing rating up to a 10 lasting all day at least twice a week. He reports he is tried over-the-counter pain medications without benefit.       Past Medical History:   Diagnosis Date    Cancer [facility])     rectal cancer    Hyperlipidemia     Hypertension         Past Surgical History:   Procedure Laterality Date    CT NEEDLE BIOPSY LUNG PERCUTANEOUS  2021    CT NEEDLE BIOPSY LUNG PERCUTANEOUS 2021 [address] [facility] CT SCAN    HAND SURGERY      IR PORT PLACEMENT EQUAL OR GREATER THAN 5 YEARS  8/10/2021    IR PORT PLACEMENT EQUAL OR GREATER THAN 5 YEARS 8/10/2021 [facility]       Family History   Problem Relation Age of Onset    High Blood Pressure Father     Heart Disease Father     Stroke Sister        Social History     Socioeconomic History    Marital status:      Spouse name: Not on file    Number of children: Not on file    Years of education: Not on file    Highest education level: Not on file   Occupational History    Not on file   Tobacco Use    Smoking status: Former Smoker     Packs/day: 0.25     Years: 6.00     Pack years: 1.50     Start date: 1981     Quit date:      Years since quittin.6    Smokeless tobacco: Never Used   Substance and Sexual Activity    Alcohol use: No    Drug use: No    Sexual activity: Not on file   Other Topics Concern    Not on file   Social History Narrative    Not on file     Social Determinants of Health     Financial Resource Strain:     Difficulty of Paying Living Expenses:    Food Insecurity:     Worried About [address] [facility] in the Last Year:     [address] in the Last Year:    Transportation Needs:     Lack of Transportation (Medical):  Lack of Transportation (Non-Medical):    Physical Activity:     Days of Exercise per Week:     Minutes of Exercise per Session:    Stress:     Feeling of Stress :    Social Connections:     Frequency of Communication with Friends and Family:     Frequency of Social Gatherings with Friends and Family:     Attends Nondenominational Services:     Active Member of Clubs or Organizations:     Attends Club or Organization Meetings:     Marital Status:    Intimate Partner Violence:     Fear of Current or Ex-Partner:     Emotionally Abused:     Physically Abused:     Sexually Abused: ALLERGIES: No Known Allergies     Current Outpatient Medications   Medication Sig Dispense Refill    clorazepate (TRANXENE) 7.5 MG tablet TAKE 1 TABLET BY MOUTH ONCE DAILY      Lactobacillus (ACIDOPHILUS PROBIOTIC FORMULA) TABS TAKE 1 TABLET BY MOUTH ONCE DAILY      pravastatin (PRAVACHOL) 40 MG tablet       OLANZapine (ZYPREXA) 5 MG tablet One tablet po HS for four nights starting the night before chemotherapy 24 tablet 0    ondansetron (ZOFRAN) 8 MG tablet Take 1 tablet by mouth every 8 hours as needed for Nausea or Vomiting 30 tablet 1    dexamethasone (DECADRON) 4 MG tablet 1 tablet PO daily for 4 days starting the day after chemotherapy. 16 tablet 0    traZODone (DESYREL) 100 MG tablet       simvastatin (ZOCOR) 40 MG tablet Take 40 mg by mouth nightly.  furosemide (LASIX) 20 MG tablet Take 20 mg by mouth daily.  atenolol (TENORMIN) 50 MG tablet Take 50 mg by mouth daily.  potassium chloride (KLOR-CON) 20 MEQ packet Take 20 mEq by mouth daily.  gabapentin (NEURONTIN) 300 MG capsule Take 300 mg by mouth nightly.  aspirin 81 MG chewable tablet Take 81 mg by mouth daily.       cilostazol (PLETAL) 50 MG tablet Take 1 tablet by mouth 2 times daily. 60 tablet 3     No current facility-administered medications for this encounter. No outpatient medications have been marked as taking for the 9/1/21 encounter [facility] Encounter) with Frank Rodriguez MD.          LABORATORY STUDIES:   CBC:   Lab Results   Component Value Date    WBC 4.7 08/17/2021    RBC 3.49 08/17/2021    HGB 10.9 08/17/2021    HCT 36.2 08/17/2021    .7 08/17/2021    MCH 31.2 08/17/2021    MCHC 30.1 08/17/2021    RDW 14.3 08/17/2021     08/17/2021    MPV 10.0 08/17/2021     CMP:  Lab Results   Component Value Date     08/17/2021    K 4.3 08/17/2021     08/17/2021    CO2 30 08/17/2021    BUN 14 08/17/2021    CREATININE 1.2 08/17/2021    GFRAA >60 08/17/2021    LABGLOM 59 08/17/2021    GLUCOSE 86 08/17/2021    PROT 5.7 08/17/2021    PROT 6.7 06/17/2011    LABALBU 3.4 08/17/2021    CALCIUM 8.2 08/17/2021    BILITOT 0.4 08/17/2021    ALKPHOS 70 08/17/2021    AST 11 08/17/2021    ALT 5 08/17/2021     Onc labs:   Lab Results   Component Value Date    PSA 0.56 06/17/2011    CEA 3.5 07/21/2021       PATHOLOGY:   8/19/2021  1:32 PM - [facility] Incoming Lab Results From [facility] (Epic Adt)    Narrative  Performed by: [facility]  Specimen #[id number]       Final Pathologic Diagnosis:   Left lung upper lobe mass, needle core biopsy:        -     NON-SMALL CELL CARCINOMA, MOST CONSISTENT WITH   LUNG ADENOCARCINOMA. RADIOLOGIC STUDIES:       XR CHEST PORTABLE    Result Date: 8/17/2021  EXAMINATION: ONE XRAY VIEW OF THE CHEST 8/17/2021 11:06 am COMPARISON: Chest radiograph 08/17/2021 at 1000 hours. HISTORY: ORDERING SYSTEM PROVIDED HISTORY: repeat post left lung biopsy TECHNOLOGIST PROVIDED HISTORY: Reason for exam:->repeat post left lung biopsy Reason for Exam: repeat post left lung biopsy FINDINGS: Unchanged cardiomediastinal silhouette. No pleural effusion or pneumothorax.  Similar hazy opacity in the left mid lung representing the patient's known lung mass. The right chest wall port in unchanged position. No post biopsy pneumothorax. XR CHEST PORTABLE    Result Date: 8/17/2021  EXAMINATION: ONE XRAY VIEW OF THE CHEST 8/17/2021 12:20 pm COMPARISON: 08/17/2021 HISTORY: ORDERING SYSTEM PROVIDED HISTORY: repeat post left lung biopsy TECHNOLOGIST PROVIDED HISTORY: Reason for exam:->repeat post left lung biopsy Reason for Exam: repeat post left lung biopsy FINDINGS: The lungs are without acute focal process. Targeted nodule with a mild degree of surrounding expected hemorrhage is seen in the left mid lung. There is no effusion or pneumothorax. The cardiomediastinal silhouette is without acute process. The osseous structures are without acute process. No pneumothorax. IR PORT PLACEMENT > 5 YEARS    Result Date: 8/10/2021  PROCEDURE: ULTRASOUND GUIDED VASCULAR ACCESS. FLUOROSCOPY GUIDED PLACEMENT OF A SUBCUTANEOUS PORT-A-CATH. MODERATE CONSCIOUS SEDATION 8/10/2021. HISTORY: ORDERING SYSTEM PROVIDED HISTORY: Cancer [facility]) TECHNOLOGIST PROVIDED HISTORY: Reason for exam:->Port Insertion SEDATION: 1.0 mg of Versed and 50 mcg fentanyl intravenously for sedation and pain control. Sedation was provided by Hailee Hansen RN. Sedation time: 15 minutes. Moderate sedation was monitored under the physician's direction. The patient's vital signs were monitored throughout the procedure and recorded in the patient's medical record by the nurse. FLUOROSCOPY DOSE AND TYPE OR TIME AND EXPOSURES: 0.7 minutes of fluoroscopic time was utilized. AK 28 mGy. TECHNIQUE: Informed consent was obtained after a detailed explanation of the procedure including risks, benefits, and alternatives. Universal protocol was observed. The right neck and chest were prepped and draped in sterile fashion using maximum sterile barrier technique. Local anesthesia was achieved with lidocaine.  A micropuncture needle was used to access the right internal jugular vein using ultrasound guidance. An ultrasound image demonstrating patency of the vein with needle tip located within it. An image was obtained and stored in PACs. A 0.035 guidewire was used to place a peel-away sheath. A chest wall incision was made and a subcutaneous pocket was created. This pocket was irrigated with a copious amount of vancomycin laden saline. The port reservoir was placed within the pocket and the port tubing was attached and tunneled subcutaneously to the venotomy site. The port reservoir was anchored to the underlying fascia using 2 2-0 Prolene sutures. The port tubing was cut to an appropriate length and advanced through the peel-away sheath under fluoroscopic guidance to the cavo-atrial junction. The chest wall incision was closed using 2-0 and 3-0 Vicryl suture and tissue adhesive was applied to the venotomy site and chest wall incision. The port flushed easily and there was a good blood return. The port was locked with heparinized saline. The patient tolerated the procedure well and there were no immediate complications. The patient received 1 g of Ancef as antibiotic prophylaxis during procedure. FINDINGS: Fluoroscopic image demonstrates the tip of the port at the cavo-atrial junction. 3 sonographic images of the right internal jugular vein reveals that it is widely patent, normally compressible, and without thrombus. .     Successful ultrasound and fluoroscopy guided right internal jugular vein Port-A-Cath placement     CT NEEDLE BIOPSY LUNG PERCUTANEOUS    Result Date: 8/17/2021  EXAMINATION: CT guided left lung mass biopsy. 8/17/2021 11:08 am TECHNIQUE: After obtaining informed consent, the patient was placed in the right posterior oblique position on the CT table. The skin superficial to the mass was identified and cleansed using a sterile technique. Next, an 21 gauge biopsy needle via a 19 gauge needle guide was percutaneously placed into the left upper lobe soft tissue mass. Needle positioning was verified with CT guidance. 7 core samples were then obtained and the needle was removed. The cytopathologist Dr. Josue Davis deemed the specimens to be adequate. A post procedural CT scan demonstrated minimal left pneumothorax formation. The patient tolerated the procedure well. Dose modulation, iterative reconstruction, and/or weight based adjustment of the mA/kV was utilized to reduce the radiation dose to as low as reasonably achievable. DLP: 1270.96 The patient received 0.5 mg of Versed and 25 mcg of fentanyl intravenously for sedation and pain control. Sedation was provided by Carloz Whittaker RN and Jose Sparks RN. Sedation time: 15 minutes. Moderate sedation was monitored under the physician's direction. The patient's vital signs were monitored throughout the procedure and recorded in the patient's medical record by the nurse. COMPARISON: PET-CT 08/02/2021 HISTORY: Patient with a solid l left upper lobe ever mass. A request has been made for biopsy. FINDINGS: Adequate needle tip positioning adjacent to the left upper lobe nodule     Successful CT guided lung mass biopsy as described above Minimal left pnemothorax was noted on post procedure scans, follow up chest x-ray will be performed. PET CT SKULL BASE TO MID THIGH    Result Date: 8/3/2021  EXAMINATION: WHOLE BODY PET/CT 8/2/2021 TECHNIQUE: Following intravenous administration of 14.5 mCi of F18-FDG, PET imaging was acquired from the base of the head to the mid thighs. Computed tomography was used for purposes of attenuation correction and anatomic localization. Fusion imaging was utilized for interpretation. Uptake time 69 mins. Glucose level 84 mg/dl. COMPARISON: Rectal MRI on 06/18/2021, chest CT on 06/10/2021, abdomen/pelvis CT on 05/25/2021 HISTORY: Rectal carcinoma, initial staging. FINDINGS: HEAD/NECK: No suspicious FDG uptake.  CHEST: A dominant 9 mm nodule in the posterior left upper lobe demonstrates maximum SUV of 3.1. Adjacent smaller nodule in the anterior left lower lobe demonstrates no significant uptake. No hypermetabolic intrathoracic lymph nodes. ABDOMEN/PELVIS: No abnormal uptake within the solid abdominal organs. No hypermetabolic mediastinal or retroperitoneal lymph nodes. The rectal mass evaluated on recent MRI demonstrates maximum SUV of 13.0. Several prominent mesorectal lymph nodes better visualized on recent MRI demonstrate mild uptake. BONES/SOFT TISSUE: No suspicious FDG uptake. INCIDENTAL CT FINDINGS: Systemic and coronary atherosclerotic disease. Left greater than right gynecomastia. Sequelae of chronic granulomatous disease. Cholelithiasis. Duodenal diverticulum. Enlarged prostate. Small fat-containing inguinal hernias. 1. Intensely hypermetabolic rectal mass is consistent with primary malignancy. Several prominent mesorectal lymph nodes with mild uptake are likely metastatic as noted on recent MRI. 2. Dominant 9 mm pulmonary nodule in the posterior left upper lobe demonstrates moderate uptake while additional smaller nodules demonstrate no significant uptake but are below the threshold for accurate PET evaluation. Findings are suspicious for pulmonary metastatic disease. CT Chest w/ contrast: Results for orders placed during the hospital encounter of 06/10/21    CT CHEST W CONTRAST    Narrative  EXAMINATION:  CT OF THE CHEST WITH CONTRAST 6/10/2021 3:11 pm    TECHNIQUE:  CT of the chest was performed with the administration of intravenous  contrast. Multiplanar reformatted images are provided for review. Dose  modulation, iterative reconstruction, and/or weight based adjustment of the  mA/kV was utilized to reduce the radiation dose to as low as reasonably  achievable.     COMPARISON:  None    HISTORY:  ORDERING SYSTEM PROVIDED HISTORY: Rectal cancer [facility])  TECHNOLOGIST PROVIDED HISTORY:  Reason for Exam: Rectal cancer ([address]  Acuity: Unknown  Type of Exam: Ongoing  Additional signs and symptoms: none    FINDINGS:  Mediastinum: No pathologically enlarged thoracic adenopathy. The heart size  within normal limits. No pericardial effusion. Thoracic aorta is normal in  caliber. The main pulmonary artery is normal in caliber. Mild to moderate  aortic atherosclerosis. Coronary arterial calcifications. Esophagus is  unremarkable. Lungs/pleura: There are scattered noncalcified solid pulmonary nodules the  largest measuring up to 9 mm in the left upper lobe (for example images 63,  61, 83). No focal consolidation, pleural effusion or pneumothorax. The  central airways are otherwise patent. Upper Abdomen: No acute findings in the upper abdomen. Soft Tissues/Bones: Bilateral gynecomastia. No acute findings in the bones  or soft tissues. Degenerative changes of the thoracic spine. Impression  1. Scattered noncalcified solid pulmonary nodules the largest measuring up to  9 mm in left upper lobe. No prior exams are available for comparison. Metastatic disease cannot be excluded. 2. No other acute cardiopulmonary findings. MRI PELVIS W WO CONTRAST    Narrative  EXAMINATION:  DOUBLE-CONTRAST RECTAL MRI WITHOUT AND WITH CONTRAST, 6/18/2021    TECHNIQUE:  Multiplanar multisequence MRI of the pelvis was performed without and with  the administration of intravenous contrast.    COMPARISON:  05/25/20211. HISTORY:  ORDERING SYSTEM PROVIDED HISTORY: Rectal cancer ([address]    Rectal adenocarcinoma 8 cm from the anal verge on colonoscopy. FINDINGS:  The distance of the lowest point of the rectal mass to the anal verge is  approximately 8.1 cm. The rectal mass measures 4.6 cm. The mass has a sessile  appearance with lobulated margins. The mass encompasses the entire  circumference of the rectal wall. The mass appears to extends 1 cm beyond the  muscularis propria on series 9, image 15 consistent with a T3c lesion.  The  MRI circumferential resection margin (the closest distance from the therapy shortly thereafter. I will also initiate him on Norco for his rectal pain. Additional physician time required for IMRT planning for a detailed contour and DVH evaluation to minimize acute and chronic toxicity of the lung, spinal cord, heart, and rib. Thank-you for allowing me to participate in the care of this very pleasant patient.           Electronically signed by Emily Nielson MD on 9/1/2021 at 2:45 PM

## 2021-09-07 PROBLEM — D64.9 ANEMIA: Status: ACTIVE | Noted: 2021-01-01

## 2021-09-07 NOTE — LETTER
[address] [address]  Phone: [phone]  Fax: [phone]    Armando Weiss MD    September 7, 2021     Keyur Hughes, [address] [place] [address] Rd    Patient: Makeda Elder   MR Number: [id number]   YOB: 1945   Date of Visit: 9/7/2021       Dear Keyur Hughes:    Thank you for referring Ny Parrish to me for evaluation/treatment. Below are the relevant portions of my assessment and plan of care. If you have questions, please do not hesitate to call me. I look forward to following [facility] along with you.     Sincerely,        Armando Weiss MD

## 2021-09-07 NOTE — PROGRESS NOTES
MA Rooming Questions  Patient: Candi Robles  MRN: [id number]    Date: 9/7/2021        1. Do you have any new issues?   no         2. Do you need any refills on medications?    no    3. Have you had any imaging done since your last visit?   no    4. Have you been hospitalized or seen in the emergency room since your last visit here?   no    5. Did the patient have a depression screening completed today?  No    No data recorded     PHQ-9 Given to (if applicable):               PHQ-9 Score (if applicable):                     [] Positive     []  Negative              Does question #9 need addressed (if applicable)                     [] Yes    []  No               Noelle Odonnell CMA

## 2021-09-07 NOTE — PROGRESS NOTES
Patient Name:  Shelby Brand  Patient :  1945  Patient MRN:  [id number]     Primary Oncologist: Bria Rob MD  Referring Provider: Di Ramirez MD     Date of Service: 2021      Reason for Consult:  Rectal cancer     Chief Complaint:    Chief Complaint   Patient presents with    Follow-up       Encounter Diagnoses   Name Primary? 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 One episode of dark blood in the stool. No constipation. Reported tenesmus. Reported suprapubic pain for the past 6 weeks usually occurs at night, lasting for 5 minutes and then resolve spontaneously. Denied any  symptoms. Appetite is okay but has lost about 25 pounds in the past 6 weeks and gained about few pounds. Denied any nausea or vomiting. Denied any chest pain, increase shortness of breath, palpitations or dizziness. 2021: EGD with normal esophagus, normal GE junction. Erosions in the mucosa was noted in the antrum. Duodenum normal.  Colonoscopy with an infiltrating fungating nonbleeding mass of malignant appearance in the rectum. Mass was about 8 cm from the anal verge. Was unable to pass the scope. 21:Final Pathologic Diagnosis:   A.  Rectum, biopsy:   -  INVASIVE MODERATELY DIFFERENTIATED ADENOCARCINOMA IS   IDENTIFIED (see comment, see stains report). [place] [place], antrum and body, biopsy:   -  Benign gastric mucosa containing minimal chronic   inflammation (see comment, see stains report). -  Helicobacter pylori microorganisms are not identified.       C.  Small bowel, second part duodenum, biopsy:   -  Benign duodenal mucosa containing no significant   histopathologic abnormality.    -  Normal villi are identified.       21: CT abdomen and pelvis:  Irregular mural thickening of the rectum, in keeping with the patient's known   history of rectal carcinoma.       There localized mesial rectal lymph nodes and implants, which are concerning   for localized spread of the disease.       No distant metastatic disease is seen within the abdomen pelvis. 6/10/21: CT chest:  1. Scattered noncalcified solid pulmonary nodules the largest measuring up to   9 mm in left upper lobe.  No prior exams are available for comparison. Metastatic disease cannot be excluded. 2. No other acute cardiopulmonary findings. 6/18/21: MRI pelvis:  1. Approximate 4.6 cm mass of the rectum extending 1 cm beyond the muscularis   propria, T3c.   2. The MRI circumferential resection margin is approximately 10 mm as   described above. 3. Apparent noncontiguous extramural venous invasion as well as to suspicious   mesorectal lymph nodes, N1c. Then he missed several appointments for port placement and treatment as plan was to start FOLFOX in the meanwhile    8/2/21: PET:   1. Intensely hypermetabolic rectal mass is consistent with primary   malignancy.  Several prominent mesorectal lymph nodes with mild uptake are   likely metastatic as noted on recent MRI. 2. Dominant 9 mm pulmonary nodule in the posterior left upper lobe   demonstrates moderate uptake while additional smaller nodules demonstrate no   significant uptake but are below the threshold for accurate PET evaluation. Findings are suspicious for pulmonary metastatic disease. 8/17/21:  Final Pathologic Diagnosis:   Left lung upper lobe mass, needle core biopsy:        -     NON-SMALL CELL CARCINOMA, MOST CONSISTENT WITH   LUNG ADENOCARCINOMA. CK AE1/AE3: Positive in the tumor cells. CK7: Positive in the tumor cells. TTF-1: Positive in the tumor cells. Napsin-A: Positive in the tumor cells. CK 5/6: Shows staining in rare tumor cells. P40: Negative in the tumor cells.      Past Medical History:     PAD, HTN, HLP, Insomnia Past Surgery History:    Bilateral carpal tunnel syn                                                                            Social History:   Lives with his wife. 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 Retired as a baker. Has 9 boys all settled in [place] to retired as a baker's as well. Reported smoking 1 pack/day for 20 years both 35 years ago. Used to consume a lot of beer about 35 years ago. No other illicit drug abuse. Family History:    Brother was diagnosed with polyps which were removed he is not sure whether he was diagnosed with cancer. No Known Allergies    Current Outpatient Medications on File Prior to Visit   Medication Sig Dispense Refill    clorazepate (TRANXENE) 7.5 MG tablet TAKE 1 TABLET BY MOUTH ONCE DAILY      Lactobacillus (ACIDOPHILUS PROBIOTIC FORMULA) TABS TAKE 1 TABLET BY MOUTH ONCE DAILY      HYDROcodone-acetaminophen (NORCO) 5-325 MG per tablet Take 1 tablet by mouth every 6 hours as needed for Pain for up to 30 days. Intended supply: 30 days 120 tablet 0    pravastatin (PRAVACHOL) 40 MG tablet       OLANZapine (ZYPREXA) 5 MG tablet One tablet po HS for four nights starting the night before chemotherapy 24 tablet 0    ondansetron (ZOFRAN) 8 MG tablet Take 1 tablet by mouth every 8 hours as needed for Nausea or Vomiting 30 tablet 1    dexamethasone (DECADRON) 4 MG tablet 1 tablet PO daily for 4 days starting the day after chemotherapy. 16 tablet 0    traZODone (DESYREL) 100 MG tablet       simvastatin (ZOCOR) 40 MG tablet Take 40 mg by mouth nightly.  furosemide (LASIX) 20 MG tablet Take 20 mg by mouth daily.  atenolol (TENORMIN) 50 MG tablet Take 50 mg by mouth daily.       potassium BASOSABS 0.1 08/17/2021    LYMPHSABS 1.5 08/17/2021    MONOSABS 0.4 08/17/2021    DIFFTYPE AUTOMATED DIFFERENTIAL 08/17/2021     No results found for: ESR  Chemistry:  Lab Results   Component Value Date     (L) 08/17/2021    K 4.3 08/17/2021     08/17/2021    CO2 30 08/17/2021    BUN 14 08/17/2021    CREATININE 1.2 08/17/2021    GLUCOSE 86 08/17/2021    CALCIUM 8.2 (L) 08/17/2021    PROT 5.7 (L) 08/17/2021    LABALBU 3.4 08/17/2021    BILITOT 0.4 08/17/2021    ALKPHOS 70 08/17/2021    AST 11 (L) 08/17/2021    ALT 5 (L) 08/17/2021    LABGLOM 59 (L) 08/17/2021    GFRAA >60 08/17/2021    MG 2.0 08/17/2021     No results found for: MMA, LDH, HOMOCYSTEINE  No components found for: LD  Lab Results   Component Value Date    TSHHS 3.444 06/17/2011     Immunology:  Lab Results   Component Value Date    PROT 5.7 (L) 08/17/2021     No results found for: ED BeachR  No results found for: B2M  Coagulation Panel:  Lab Results   Component Value Date    PROTIME 13.8 08/10/2021    INR 1.07 08/10/2021    APTT 31.2 08/10/2021     Anemia Panel:  No results found for: DBLQQLCD28, FOLATE  Tumor Markers:  Lab Results   Component Value Date    CEA 3.5 07/21/2021    PSA 0.56 06/17/2011        Observations:  ECOG:  No data recorded       Assessment & Plan:                                                          Rectal cancer: Moderately Differentiated adenocarcinoma. 8 cm from the anal verge. MRI abdomen as above. CT chest with small pulmonary nodules largest being 9 mm. No prior CT scans to compare. The plan was to obtain PET scan, consult with surgery(he did not wish to be referred to [facility]), consult with radiation oncology for further treatment plan and in the meantime start chemotherapy with FOLFOX. July 2021 CEA 3.5  But he was very complaint with appointments and did finally get the PET on 8/2/21 and biopsy on 8/17 was done.    Plan to proceed with SBRT lung nodule and then Recommend complete neoadjuvant chemotherapy followed by chemoradiation followed by surgery, consult placed for Dr Fernandez Rojas. PORT placed. OCM completed long term ago. Will monitor for adverse effects and dose modifications will be made accordingly. NADIA adeno ca: Plan for [facility]    Rectal pain: Adequate analgesic regimen and Adequate bowel regimen. Macrocytic anemia: R/O b12 def. Improve nutritional status, recommend Boost and MVI. Continue other medical care. Discussed above findings and plan with him and he voiced understanding. Answered all his questions. Discussed healthy lifestyle including healthy diet, regular exercise as tolerated. Also discussed importance of being up-to-date with age-appropriate screening tools. Recommend follow-up with primary care physician and other specialists. Please do not hesitate to contact us if you need further information. Return to clinic  Prior to C2 or earlier if new Sx    I have recommended that the patient follow CDC guidelines for prevention of COVID-19 infection. He says he has received covid vaccine.      [id number]

## 2021-09-09 NOTE — TELEPHONE ENCOUNTER
PT RETURNED CALL - WILL CALL BACK ONCE HE SEES OTHER DR TO DETERMINE WHEN HE CAN SCHEDULE APPT WITH DR Darryn Padilla

## 2021-09-16 NOTE — PROGRESS NOTES
Chief Complaint   Patient presents with    New Patient     rectal cancer ref       SUBJECTIVE:  HPI: Patient presents for evaluation of cancer involving the rectum. The tumor was identified by colonoscopy. Evaluation was prompted by screening for colon cancer 21. The tumor wasbiopsied and histology showed a moderately differentiated tumor. Currently the patient has no abdominal pain. Patient complains of bloody stools. Patient deniesconstipation. There is not a family history of colon cancer. Pt also has lung nodules which on biopsy show non small cell ca. I have reviewed the patient's(pertinent informationto this visit) medical history, family history(scanned in  the Media tab under \"patient questioner\"), social history and review of systems with the patient today in the office.        Past Surgical History:   Procedure Laterality Date    CT NEEDLE BIOPSY LUNG PERCUTANEOUS  2021    CT NEEDLE BIOPSY LUNG PERCUTANEOUS 2021 [facility] [address] SCAN    HAND SURGERY      IR PORT PLACEMENT EQUAL OR GREATER THAN 5 YEARS  8/10/2021    IR PORT PLACEMENT EQUAL OR GREATER THAN 5 YEARS 8/10/2021 [facility] SPECIAL PROCEDURES     Past Medical History:   Diagnosis Date    Cancer ([address]     rectal cancer    Hyperlipidemia     Hypertension      Family History   Problem Relation Age of Onset    High Blood Pressure Father     Heart Disease Father     Stroke Sister      Social History     Socioeconomic History    Marital status:      Spouse name: Not on file    Number of children: Not on file    Years of education: Not on file    Highest education level: Not on file   Occupational History    Not on file   Tobacco Use    Smoking status: Former Smoker     Packs/day: 0.25     Years: 6.00     Pack years: 1.50     Start date: 1981     Quit date:      Years since quittin.8    Smokeless tobacco: Never Used   Substance and Sexual Activity    Alcohol use: No    Drug use: No    Sexual activity: Not on file   Other Topics Concern    Not on file   Social History Narrative    Not on file     Social Determinants of Health     Financial Resource Strain:     Difficulty of Paying Living Expenses:    Food Insecurity:     Worried About Running Out of Food in the Last Year:     [address] in the Last Year:    Transportation Needs:     Lack of Transportation (Medical):  Lack of Transportation (Non-Medical):    Physical Activity:     Days of Exercise per Week:     Minutes of Exercise per Session:    Stress:     Feeling of Stress :    Social Connections:     Frequency of Communication with Friends and Family:     Frequency of Social Gatherings with Friends and Family:     Attends Confucianism Services:     Active Member of Clubs or Organizations:     Attends Club or Organization Meetings:     Marital Status:    Intimate Partner Violence:     Fear of Current or Ex-Partner:     Emotionally Abused:     Physically Abused:     Sexually Abused:        Current Outpatient Medications   Medication Sig Dispense Refill    clorazepate (TRANXENE) 7.5 MG tablet TAKE 1 TABLET BY MOUTH ONCE DAILY      Lactobacillus (ACIDOPHILUS PROBIOTIC FORMULA) TABS TAKE 1 TABLET BY MOUTH ONCE DAILY      pravastatin (PRAVACHOL) 40 MG tablet       traZODone (DESYREL) 100 MG tablet       simvastatin (ZOCOR) 40 MG tablet Take 40 mg by mouth nightly.  furosemide (LASIX) 20 MG tablet Take 20 mg by mouth daily.  atenolol (TENORMIN) 50 MG tablet Take 50 mg by mouth daily.  potassium chloride (KLOR-CON) 20 MEQ packet Take 20 mEq by mouth daily.  gabapentin (NEURONTIN) 300 MG capsule Take 300 mg by mouth nightly.  aspirin 81 MG chewable tablet Take 81 mg by mouth daily.  cilostazol (PLETAL) 50 MG tablet Take 1 tablet by mouth 2 times daily. 60 tablet 3    HYDROcodone-acetaminophen (NORCO) 5-325 MG per tablet Take 1 tablet by mouth every 6 hours as needed for Pain for up to 30 days.  Intended supply: 30 days 120 tablet 0    OLANZapine (ZYPREXA) 5 MG tablet One tablet po HS for four nights starting the night of chemotherapy 24 tablet 0    ondansetron (ZOFRAN) 8 MG tablet Take 1 tablet by mouth every 8 hours as needed for Nausea or Vomiting 30 tablet 1    dexamethasone (DECADRON) 4 MG tablet 1 tablet PO daily for 4 days starting the day after chemotherapy every two weeks. 16 tablet 3     No current facility-administered medications for this visit. No Known Allergies    Review of Systems:         Review of Systems   Constitutional: Negative for chills and fever. HENT: Negative for ear pain, mouth sores, sore throat and tinnitus. Eyes: Negative for photophobia, redness and itching. Respiratory: Negative for apnea, choking and stridor. Cardiovascular: Negative for chest pain and palpitations. Gastrointestinal: Positive for rectal pain. Negative for anal bleeding and constipation. Endocrine: Negative for polydipsia. Genitourinary: Negative for enuresis, flank pain and hematuria. Musculoskeletal: Negative for back pain, joint swelling and myalgias. Skin: Negative for color change and pallor. Allergic/Immunologic: Negative for environmental allergies. Neurological: Negative for syncope and speech difficulty. Psychiatric/Behavioral: Negative for confusion and hallucinations. OBJECTIVE:  Physical Exam:    BP (!) 108/52 (Site: Right Upper Arm, Position: Sitting, Cuff Size: Medium Adult)   Pulse 53   Ht 5' 10\" (1.778 m)   Wt 197 lb 4.8 oz (89.5 kg)   SpO2 99%   BMI 28.31 kg/m²      Physical Exam  Constitutional:       Appearance: He is well-developed. HENT:      Head: Normocephalic. Eyes:      Pupils: Pupils are equal, round, and reactive to light. Cardiovascular:      Rate and Rhythm: Normal rate. Pulmonary:      Effort: Pulmonary effort is normal.   Abdominal:      General: There is no distension. Palpations: Abdomen is soft. There is no mass. Tenderness:  There is no abdominal tenderness. There is no guarding or rebound. Genitourinary:     Rectum: Mass present. Musculoskeletal:         General: Normal range of motion. Cervical back: Normal range of motion and neck supple. Skin:     General: Skin is warm. Neurological:      Mental Status: He is alert and oriented to person, place, and time. ASSESSMENT:  1. Rectal cancer [address] [address]          PLAN:  Treatment:  Pt with rectal cancer now has mediport and pt is about to start neoadjuvant chemo/radiation. Will discuss care with Dr Puneet Fonseca. Patient counseled on risks, benefits, and alternatives of treatment plan at length. Patient states an understanding andwillingness to proceed with plan. No orders of the defined types were placed in this encounter. No orders of the defined types were placed in this encounter. Follow Up:  No follow-ups on file.       Wagner More MD

## 2021-09-17 NOTE — CARE COORDINATION
Pt and his spouse experienced car trouble while in the [facility] parking lot. He was observed asking other patients if they could obtain a ride to their home. Pt stated no friends or family were available to help them. They are not members of [employer] or a motor club. LSW made contact with [employer] and a ride was arranged. Emergency assistance dollars were used to pay for Pt and Spouse's trip home.

## 2021-09-20 NOTE — PROGRESS NOTES
Patient arrived to treatment suite for pre-medications, chemotherapy infusion, and connection of a home infusion pump. Right chest mediport accessed and good blood return noted. No questions or concerns for the doctor at this time, but stated that he did not get any prescriptions at home. Treatment approved and given. Patient tolerated well. Left treatment suite ambulatory. Mediport left connected for home infusion. Discharge instructions provided. Patient's status assessed and documented appropriately. All labs and required results were also reviewed today. Treatment parameters have been reviewed. Today's treatment has been approved by the provider. Treatment orders and medication sequencing (when applicable) was verified by 2 registered nurses. The treatment plan was confirmed with the patient prior to administration, and the patient understands the need to report any treatment-related symptoms. Prior to administration, when applicable, the following 8 elements of medication administration were reviewed with 2nd Registered Nurse prior to dosing: drug name, drug dose, infusion volume when prepared in a syringe, rate of administration, expiration dates and/or times, appearance and integrity of drug(s), and rate of pump for infusion. The 5 rights of medication administration have been verified.

## 2021-09-20 NOTE — PROGRESS NOTES
Pt had >10% weight loss since original orders were entered. Updated weight in treatment plan to reflect correct medication doses.

## 2021-09-28 NOTE — PROGRESS NOTES
Per RT, pt will be finished with SBRT on 10/11. They will notify med/onc if any delays occur. Per Dr Jeniffer Trevizo, pt scheduled to restart chemo (C2) on 10/25.  notified and will confirm with pt.

## 2021-10-04 NOTE — PROGRESS NOTES
MA Rooming Questions  Patient: Dung Girard  MRN: [id number]    Date: 10/4/2021        1. Do you have any new issues? yes - Patient c/o blood and pus in stool and irregular bowel movements         2. Do you need any refills on medications? Patient requesting refill on Norco    3. Have you had any imaging done since your last visit?   no    4. Have you been hospitalized or seen in the emergency room since your last visit here?   no    5. Did the patient have a depression screening completed today?  No    No data recorded     PHQ-9 Given to (if applicable):               PHQ-9 Score (if applicable):                     [] Positive     []  Negative              Does question #9 need addressed (if applicable)                     [] Yes    []  No               Mohsen Anton MA

## 2021-10-04 NOTE — PROGRESS NOTES
Patient Name:  Meera Bell  Patient :  1945  Patient MRN:  [id number]     Primary Oncologist: Javed Ayers MD  Referring Provider: Jason Godwin MD     Date of Service: 10/4/2021      Reason for Consult:  Rectal cancer     Chief Complaint:    Chief Complaint   Patient presents with    Follow-up    Pain     Rectal pain    Other     Patient c/o blood and pus in stool and irregular bowel movements       Encounter Diagnosis   Name Primary?  Rectal cancer [facility])         HPI:   21: Arrived with his wife to the clinic today he reported diarrhea for the past 6 weeks. One episode of dark blood in the stool. No constipation. Reported tenesmus. Reported suprapubic pain for the past 6 weeks usually occurs at night, lasting for 5 minutes and then resolve spontaneously. Denied any  symptoms. Appetite is okay but has lost about 25 pounds in the past 6 weeks and gained about few pounds. Denied any nausea or vomiting. Denied any chest pain, increase shortness of breath, palpitations or dizziness. 2021: EGD with normal esophagus, normal GE junction. Erosions in the mucosa was noted in the antrum. Duodenum normal.  Colonoscopy with an infiltrating fungating nonbleeding mass of malignant appearance in the rectum. Mass was about 8 cm from the anal verge. Was unable to pass the scope. 21:Final Pathologic Diagnosis:   A.  Rectum, biopsy:   -  INVASIVE MODERATELY DIFFERENTIATED ADENOCARCINOMA IS   IDENTIFIED (see comment, see stains report). [place], antrum and body, biopsy:   -  Benign gastric mucosa containing minimal chronic   inflammation (see comment, see stains report). -  Helicobacter pylori microorganisms are not identified.       C.  Small bowel, second part duodenum, biopsy:   -  Benign duodenal mucosa containing no significant   histopathologic abnormality.    -  Normal villi are identified.       21: CT abdomen and pelvis:  Irregular mural thickening of the rectum, in keeping with the patient's known   history of rectal carcinoma.       There localized mesial rectal lymph nodes and implants, which are concerning   for localized spread of the disease.       No distant metastatic disease is seen within the abdomen pelvis. 6/10/21: CT chest:  1. Scattered noncalcified solid pulmonary nodules the largest measuring up to   9 mm in left upper lobe.  No prior exams are available for comparison. Metastatic disease cannot be excluded. 2. No other acute cardiopulmonary findings. 6/18/21: MRI pelvis:  1. Approximate 4.6 cm mass of the rectum extending 1 cm beyond the muscularis   propria, T3c.   2. The MRI circumferential resection margin is approximately 10 mm as   described above. 3. Apparent noncontiguous extramural venous invasion as well as to suspicious   mesorectal lymph nodes, N1c. Then he missed several appointments for port placement and treatment as plan was to start FOLFOX in the meanwhile    8/2/21: PET:   1. Intensely hypermetabolic rectal mass is consistent with primary   malignancy.  Several prominent mesorectal lymph nodes with mild uptake are   likely metastatic as noted on recent MRI. 2. Dominant 9 mm pulmonary nodule in the posterior left upper lobe   demonstrates moderate uptake while additional smaller nodules demonstrate no   significant uptake but are below the threshold for accurate PET evaluation. Findings are suspicious for pulmonary metastatic disease. 8/17/21:  Final Pathologic Diagnosis:   Left lung upper lobe mass, needle core biopsy:        -     NON-SMALL CELL CARCINOMA, MOST CONSISTENT WITH   LUNG ADENOCARCINOMA. CK AE1/AE3: Positive in the tumor cells. CK7: Positive in the tumor cells. TTF-1: Positive in the tumor cells. Napsin-A: Positive in the tumor cells. CK 5/6: Shows staining in rare tumor cells. P40: Negative in the tumor cells.      SBRT to the lung  planned for 10/6, 10/8 and 10/11/2021    Past Medical History:     PAD, HTN, HLP, Insomnia                                                         Past Surgery History:    Bilateral carpal tunnel syn                                                                            Social History:   Lives with his wife. 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 Retired as a baker. Has 9 boys all settled in [place] to retired as a baker's as well. Reported smoking 1 pack/day for 20 years both 35 years ago. Used to consume a lot of beer about 35 years ago. No other illicit drug abuse. Family History:    Brother was diagnosed with polyps which were removed he is not sure whether he was diagnosed with cancer. No Known Allergies    Current Outpatient Medications on File Prior to Visit   Medication Sig Dispense Refill    OLANZapine (ZYPREXA) 5 MG tablet One tablet po HS for four nights starting the night of chemotherapy 24 tablet 0    ondansetron (ZOFRAN) 8 MG tablet Take 1 tablet by mouth every 8 hours as needed for Nausea or Vomiting 30 tablet 1    dexamethasone (DECADRON) 4 MG tablet 1 tablet PO daily for 4 days starting the day after chemotherapy every two weeks. 16 tablet 3    clorazepate (TRANXENE) 7.5 MG tablet TAKE 1 TABLET BY MOUTH ONCE DAILY      Lactobacillus (ACIDOPHILUS PROBIOTIC FORMULA) TABS TAKE 1 TABLET BY MOUTH ONCE DAILY      pravastatin (PRAVACHOL) 40 MG tablet       traZODone (DESYREL) 100 MG tablet       simvastatin (ZOCOR) 40 MG tablet Take 40 mg by mouth nightly.  furosemide (LASIX) 20 MG tablet Take 20 mg by mouth daily.  atenolol (TENORMIN) 50 MG tablet Take 50 mg by mouth daily.  potassium chloride (KLOR-CON) 20 MEQ packet Take 20 mEq by mouth daily.       gabapentin (NEURONTIN) 300 MG capsule Take 300 mg by mouth nightly.  aspirin 81 MG chewable tablet Take 81 mg by mouth daily.  cilostazol (PLETAL) 50 MG tablet Take 1 tablet by mouth 2 times daily. 60 tablet 3     No current facility-administered medications on file prior to visit. Interval history:10/4/21: Arrived alone to the clinic today. Continues to have mild bleeding in passing the stool. Irregular bowel movements. Intermittent nausea but no emesis. Pain controlled with current analgesic regimen. Appetite is okay but has lost 10 more pounds. Has been consuming 1 Ensure per day. Denied any other bleeding. Denied any fever cough or any  symptoms. Denied any lower extremity edema. Review of Systems:  As per interval history, otherwise ros neg.       Vital Signs: BP (!) 91/51 (Site: Right Upper Arm, Position: Sitting, Cuff Size: Medium Adult)   Pulse 76   Temp 96.7 °F (35.9 °C) (Infrared)   Resp 12   Ht 5' 3\" (1.6 m)   Wt 190 lb (86.2 kg)   SpO2 98%   BMI 33.66 kg/m²      CONSTITUTIONAL: awake, alert, cooperative, tired appearing  EYES: PEBBLES, Pallor+  ENT: ATNC  NECK: No JVD  HEMATOLOGIC/LYMPHATIC: no cervical, supraclavicular or axillary lymphadenopathy   LUNGS: CTAB  CARDIOVASCULAR: s1s2 rrr no murmurs  ABDOMEN: soft ntnd bs pos  MUSCULOSKELETAL: full range of motion noted, tone is normal  NEUROLOGIC: GI  SKIN: No rash  EXTREMITIES: no LE edema bilaterally     Labs:  Hematology:  Lab Results   Component Value Date    WBC 2.7 (L) 10/01/2021    RBC 3.51 (L) 10/01/2021    HGB 11.1 (L) 10/01/2021    HCT 34.3 (L) 10/01/2021    MCV 97.7 10/01/2021    MCH 31.6 (H) 10/01/2021    MCHC 32.4 10/01/2021    RDW 13.5 10/01/2021     10/01/2021    MPV 10.2 10/01/2021    SEGSPCT 30.8 (L) 10/01/2021    EOSRELPCT 3.0 10/01/2021    BASOPCT 0.8 10/01/2021    LYMPHOPCT 50.4 (H) 10/01/2021    MONOPCT 15.0 (H) 10/01/2021    SEGSABS 0.8 10/01/2021    EOSABS 0.1 10/01/2021    BASOSABS 0.0 10/01/2021 LYMPHSABS 1.3 10/01/2021    MONOSABS 0.4 10/01/2021    DIFFTYPE AUTOMATED DIFFERENTIAL 10/01/2021     No results found for: ESR  Chemistry:  Lab Results   Component Value Date     (L) 10/01/2021    K 4.3 10/01/2021    CL 98 (L) 10/01/2021    CO2 26 10/01/2021    BUN 19 10/01/2021    CREATININE 0.8 (L) 10/01/2021    GLUCOSE 101 (H) 10/01/2021    CALCIUM 8.4 10/01/2021    PROT 5.7 (L) 10/01/2021    LABALBU 3.7 10/01/2021    BILITOT 0.4 10/01/2021    ALKPHOS 97 10/01/2021    AST 21 10/01/2021    ALT 15 10/01/2021    LABGLOM >60 10/01/2021    GFRAA >60 10/01/2021    MG 1.9 10/01/2021    POCCA 1.18 09/17/2021    POCGLU 83 09/17/2021     No results found for: MMA, LDH, HOMOCYSTEINE  No components found for: LD  Lab Results   Component Value Date    TSHHS 3.444 06/17/2011     Immunology:  Lab Results   Component Value Date    PROT 5.7 (L) 10/01/2021     No results found for: [facility] Knowles, [facility]  No results found for: B2M  Coagulation Panel:  Lab Results   Component Value Date    PROTIME 13.8 08/10/2021    INR 1.07 08/10/2021    APTT 31.2 08/10/2021     Anemia Panel:  Lab Results   Component Value Date    SZOGHZJP63 >2000 (H) 09/17/2021    FOLATE 4.2 09/17/2021     Tumor Markers:  Lab Results   Component Value Date    CEA 3.5 07/21/2021    PSA 0.56 06/17/2011        Observations:  ECOG:  No data recorded       Assessment & Plan:                                                          Rectal cancer: Moderately Differentiated adenocarcinoma. 8 cm from the anal verge. MRI abdomen as above. CT chest with small pulmonary nodules largest being 9 mm. No prior CT scans to compare. The plan was to obtain PET scan, consult with surgery(he did not wish to be referred to [place]), consult with radiation oncology for further treatment plan and in the meantime start chemotherapy with FOLFOX.    July 2021 CEA 3.5  But he was very complaint with appointments and did finally get the PET on 8/2/21 and biopsy on 8/17 was done.   Plan to proceed with SBRT lung nodule and then Recommend complete neoadjuvant chemotherapy followed by chemoradiation followed by surgery, evaluated by Dr Donna Joseph. PORT placed. OCM completed long term ago. Will monitor for adverse effects and dose modifications will be made accordingly. Note neutropenia could be secondary to the recent chemotherapy. No convincing signs or symptoms of infection. Avoid sick contacts. Notify if any fever. NADIA adeno ca: Plan for [facility]    Rectal pain: Adequate analgesic regimen and Adequate bowel regimen. Mild normocytic anemia: Improve nutritional status, recommend increasing Ensure to 3 to 4 cans/day. Continue multivitamin. Continue other medical care. Discussed above findings and plan with him and he voiced understanding. Answered all his questions. Discussed healthy lifestyle including healthy diet, regular exercise as tolerated. Also discussed importance of being up-to-date with age-appropriate screening tools. Recommend follow-up with primary care physician and other specialists. Please do not hesitate to contact us if you need further information. Return to clinic 10/22/2021 or earlier if new Sx    I have recommended that the patient follow CDC guidelines for prevention of COVID-19 infection. He says he has received covid vaccine.      [id number]

## 2021-10-04 NOTE — PROGRESS NOTES
Pt showed up for his tx appt today & was unaware his tx's were \"on hold\" until his radiation was complete. I had spoke with NN prior & was instructed to cancel pt's tx appts & schedule out for 10/25. Asked if pt is aware of this change NN said he should be & I was  to keep his OV which was already scheduled on 10/4 @ 1:15. I went out & explained to pt what was going on & he voiced understanding. Dr. Serenity Nova was called & his ov was moved up to 8:45.

## 2021-10-21 NOTE — PROGRESS NOTES
activity: Not on file   Other Topics Concern    Not on file   Social History Narrative    Not on file     Social Determinants of Health     Financial Resource Strain:     Difficulty of Paying Living Expenses:    Food Insecurity:     Worried About Running Out of Food in the Last Year:     [address] in the Last Year:    Transportation Needs:     Lack of Transportation (Medical):  Lack of Transportation (Non-Medical):    Physical Activity:     Days of Exercise per Week:     Minutes of Exercise per Session:    Stress:     Feeling of Stress :    Social Connections:     Frequency of Communication with Friends and Family:     Frequency of Social Gatherings with Friends and Family:     Attends Christianity Services:     Active Member of Clubs or Organizations:     Attends Club or Organization Meetings:     Marital Status:    Intimate Partner Violence:     Fear of Current or Ex-Partner:     Emotionally Abused:     Physically Abused:     Sexually Abused:        Current Outpatient Medications   Medication Sig Dispense Refill    cephALEXin (KEFLEX) 500 MG capsule Take 1 capsule by mouth 3 times daily 30 capsule 0    HYDROcodone-acetaminophen (NORCO) 5-325 MG per tablet Take 1 tablet by mouth every 6 hours as needed for Pain for up to 30 days. Intended supply: 30 days 120 tablet 0    OLANZapine (ZYPREXA) 5 MG tablet One tablet po HS for four nights starting the night of chemotherapy 24 tablet 0    ondansetron (ZOFRAN) 8 MG tablet Take 1 tablet by mouth every 8 hours as needed for Nausea or Vomiting 30 tablet 1    dexamethasone (DECADRON) 4 MG tablet 1 tablet PO daily for 4 days starting the day after chemotherapy every two weeks.  16 tablet 3    clorazepate (TRANXENE) 7.5 MG tablet TAKE 1 TABLET BY MOUTH ONCE DAILY      Lactobacillus (ACIDOPHILUS PROBIOTIC FORMULA) TABS TAKE 1 TABLET BY MOUTH ONCE DAILY      pravastatin (PRAVACHOL) 40 MG tablet       traZODone (DESYREL) 100 MG tablet       simvastatin (ZOCOR) 40 MG tablet Take 40 mg by mouth nightly.  furosemide (LASIX) 20 MG tablet Take 20 mg by mouth daily.  atenolol (TENORMIN) 50 MG tablet Take 50 mg by mouth daily.  potassium chloride (KLOR-CON) 20 MEQ packet Take 20 mEq by mouth daily.  gabapentin (NEURONTIN) 300 MG capsule Take 300 mg by mouth nightly.  aspirin 81 MG chewable tablet Take 81 mg by mouth daily.  cilostazol (PLETAL) 50 MG tablet Take 1 tablet by mouth 2 times daily. 60 tablet 3     No current facility-administered medications for this visit. No Known Allergies    Review of Systems:         Review of Systems   Constitutional: Negative for chills and fever. HENT: Negative for ear pain, mouth sores, sore throat and tinnitus. Eyes: Negative for photophobia, redness and itching. Respiratory: Negative for apnea, choking and stridor. Cardiovascular: Negative for chest pain and palpitations. Gastrointestinal: Positive for rectal pain. Negative for anal bleeding and constipation. Endocrine: Negative for polydipsia. Genitourinary: Negative for enuresis, flank pain and hematuria. Musculoskeletal: Negative for back pain, joint swelling and myalgias. Skin: Negative for color change and pallor. Allergic/Immunologic: Negative for environmental allergies. Neurological: Negative for syncope and speech difficulty. Psychiatric/Behavioral: Negative for confusion and hallucinations. OBJECTIVE:  Physical Exam:    /64   Pulse 70   Ht 5' 3\" (1.6 m)   Wt 186 lb 5 oz (84.5 kg)   SpO2 98%   BMI 33.00 kg/m²      Physical Exam  Constitutional:       Appearance: He is well-developed. HENT:      Head: Normocephalic. Eyes:      Pupils: Pupils are equal, round, and reactive to light. Cardiovascular:      Rate and Rhythm: Normal rate. Pulmonary:      Effort: Pulmonary effort is normal.   Abdominal:      General: There is no distension. Palpations: Abdomen is soft.  There is no mass. Tenderness: There is no abdominal tenderness. There is no guarding or rebound. Genitourinary:     Rectum: Mass present. Musculoskeletal:         General: Normal range of motion. Cervical back: Normal range of motion and neck supple. Skin:     General: Skin is warm. Neurological:      Mental Status: He is alert and oriented to person, place, and time. ASSESSMENT:  1. Rectal cancer ([address]          PLAN:  Treatment:  Pt with rectal cancer now has mediport and pt is about to start neoadjuvant chemo/radiation. Will discuss care with Dr Garett Hubbard for possible radiation treatment to avoid any obstruction. Patient is clinically not obstructed. He has received several rounds of radiation to the lung cancer as well. Prognosis is very poor. .  Patient counseled on risks, benefits, and alternatives of treatment plan at length. Patient states an understanding andwillingness to proceed with plan. No orders of the defined types were placed in this encounter. Orders Placed This Encounter   Medications    cephALEXin (KEFLEX) 500 MG capsule     Sig: Take 1 capsule by mouth 3 times daily     Dispense:  30 capsule     Refill:  0        Follow Up:  No follow-ups on file.       Narda Valentino MD

## 2021-10-25 NOTE — TELEPHONE ENCOUNTER
Pt called to cancel his OV and treatment today because he stated that he has diarrhea that is occurring about every 30 min. He stated that he took one antidiarrheal tablet at 3 am and it may have helped some but not enough. Instructed pt that he should take the Imodium with each loose stool until the diarrhea resolves. Pt expressed understanding. Call sent to  to reschedule [address] and [address].

## 2021-10-26 NOTE — TELEPHONE ENCOUNTER
SCOTT for pt informing him I needed to change his lab appt on 10/29 from 2:00 to 10:00; asked for a return call to verify he received this message.

## 2021-11-01 NOTE — PROGRESS NOTES
Patient Name:  Teddy Kent  Patient :  1945  Patient MRN:  [id number]     Primary Oncologist: John Vinson MD  Referring Provider: Clifton Sanchez MD     Date of Service: 2021      Reason for Consult:  Rectal cancer     Chief Complaint:    Chief Complaint   Patient presents with    Follow-up    Chemotherapy       Encounter Diagnoses   Name Primary? 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 One episode of dark blood in the stool. No constipation. Reported tenesmus. Reported suprapubic pain for the past 6 weeks usually occurs at night, lasting for 5 minutes and then resolve spontaneously. Denied any  symptoms. Appetite is okay but has lost about 25 pounds in the past 6 weeks and gained about few pounds. Denied any nausea or vomiting. Denied any chest pain, increase shortness of breath, palpitations or dizziness. 2021: EGD with normal esophagus, normal GE junction. Erosions in the mucosa was noted in the antrum. Duodenum normal.  Colonoscopy with an infiltrating fungating nonbleeding mass of malignant appearance in the rectum. Mass was about 8 cm from the anal verge. Was unable to pass the scope. 21:Final Pathologic Diagnosis:   A.  Rectum, biopsy:   -  INVASIVE MODERATELY DIFFERENTIATED ADENOCARCINOMA IS   IDENTIFIED (see comment, see stains report). Altamease Amanda, antrum and body, biopsy:   -  Benign gastric mucosa containing minimal chronic   inflammation (see comment, see stains report). -  Helicobacter pylori microorganisms are not identified.       C.  Small bowel, second part duodenum, biopsy:   -  Benign duodenal mucosa containing no significant   histopathologic abnormality.    -  Normal villi are identified.       21: CT abdomen and pelvis:  Irregular mural thickening of and 10/11/2021    Plan to resume C2D1 FOLFOX      Past Medical History:     PAD, HTN, HLP, Insomnia                                                         Past Surgery History:    Bilateral carpal tunnel syn                                                                            Social History:   Lives with his wife. 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 Retired as a baker. Has 9 boys all settled in [place] to retired as a baker's as well. Reported smoking 1 pack/day for 20 years both 35 years ago. Used to consume a lot of beer about 35 years ago. No other illicit drug abuse. Family History:    Brother was diagnosed with polyps which were removed he is not sure whether he was diagnosed with cancer. No Known Allergies    Current Outpatient Medications on File Prior to Visit   Medication Sig Dispense Refill    cephALEXin (KEFLEX) 500 MG capsule Take 1 capsule by mouth 3 times daily 30 capsule 0    HYDROcodone-acetaminophen (NORCO) 5-325 MG per tablet Take 1 tablet by mouth every 6 hours as needed for Pain for up to 30 days. Intended supply: 30 days 120 tablet 0    OLANZapine (ZYPREXA) 5 MG tablet One tablet po HS for four nights starting the night of chemotherapy 24 tablet 0    ondansetron (ZOFRAN) 8 MG tablet Take 1 tablet by mouth every 8 hours as needed for Nausea or Vomiting 30 tablet 1    dexamethasone (DECADRON) 4 MG tablet 1 tablet PO daily for 4 days starting the day after chemotherapy every two weeks.  16 tablet 3    clorazepate (TRANXENE) 7.5 MG tablet TAKE 1 TABLET BY MOUTH ONCE DAILY      Lactobacillus (ACIDOPHILUS PROBIOTIC FORMULA) TABS TAKE 1 TABLET BY MOUTH ONCE DAILY      pravastatin (PRAVACHOL) 40 MG tablet       traZODone (DESYREL) 100 MG tablet       simvastatin (ZOCOR) 40 MG tablet Take 40 mg by mouth nightly.  furosemide (LASIX) 20 MG tablet Take 20 mg by mouth daily.  atenolol (TENORMIN) 50 MG tablet Take 50 mg by mouth daily.  potassium chloride (KLOR-CON) 20 MEQ packet Take 20 mEq by mouth daily.  gabapentin (NEURONTIN) 300 MG capsule Take 300 mg by mouth nightly.  aspirin 81 MG chewable tablet Take 81 mg by mouth daily.  cilostazol (PLETAL) 50 MG tablet Take 1 tablet by mouth 2 times daily. 60 tablet 3     No current facility-administered medications on file prior to visit. Interval history:11/1/21: Arrived with his wife to the clinic today. Reported that his appetite is better and gained 5 lbs. No chest pain, increased sob, fever, cough, palpitations or any dizziness. No dysphagia or any odynophagia. Reported intermittent lower abdominal pain, relieves when he passes gas. Reported liquid stool with pus, about the same as last time. No blood in the stool. Reported rectal pain is the same. Has been taking norco every 6 hrs. . No nausea or emesis. No  sx. Reported that the port was sore and is being treated with antibiotics     Review of Systems:  As per interval history, otherwise ros neg.       Vital Signs: BP (!) 118/58 (Position: Sitting)   Pulse 70   Temp 96.9 °F (36.1 °C) (Temporal)   Resp 16   Ht 5' 9\" (1.753 m)   Wt 195 lb (88.5 kg)   SpO2 98%   BMI 28.80 kg/m²      CONSTITUTIONAL: awake, alert, cooperative, tired appearing, poor hygeine  EYES: PEBBLES, Pallor+  ENT: ATNC  NECK: No JVD  HEMATOLOGIC/LYMPHATIC: no cervical, supraclavicular or axillary lymphadenopathy   LUNGS: CTAB  CARDIOVASCULAR: s1s2 rrr no murmurs  ABDOMEN: soft ntnd bs pos  MUSCULOSKELETAL: full range of motion noted, tone is normal  NEUROLOGIC: GI  SKIN:rash  EXTREMITIES: no LE edema bilaterally     Labs:  Hematology:  Lab Results   Component Value Date    WBC 5.2 10/29/2021    RBC 3.28 (L) 10/29/2021    HGB 10.5 (L) 10/29/2021    HCT 33.6 (L) 10/29/2021    .4 (H) 10/29/2021    MCH 32.0 (H) 10/29/2021    MCHC 31.3 (L) 10/29/2021    RDW 15.5 (H) 10/29/2021     (L) 10/29/2021    MPV 9.7 10/29/2021    SEGSPCT 67.8 (H) 10/29/2021    EOSRELPCT 3.8 (H) 10/29/2021    BASOPCT 0.6 10/29/2021    LYMPHOPCT 16.9 (L) 10/29/2021    MONOPCT 10.9 (H) 10/29/2021    SEGSABS 3.5 10/29/2021    EOSABS 0.2 10/29/2021    BASOSABS 0.0 10/29/2021    LYMPHSABS 0.9 10/29/2021    MONOSABS 0.6 10/29/2021    DIFFTYPE AUTOMATED DIFFERENTIAL 10/29/2021     No results found for: ESR  Chemistry:  Lab Results   Component Value Date     10/29/2021    K 4.6 10/29/2021     10/29/2021    CO2 27 10/29/2021    BUN 22 10/29/2021    CREATININE 0.8 (L) 10/29/2021    GLUCOSE 100 (H) 10/29/2021    CALCIUM 8.4 10/29/2021    PROT 5.7 (L) 10/29/2021    LABALBU 3.6 10/29/2021    BILITOT 0.5 10/29/2021    ALKPHOS 72 10/29/2021    AST 15 10/29/2021    ALT 8 (L) 10/29/2021    LABGLOM >60 10/29/2021    GFRAA >60 10/29/2021    MG 2.1 10/22/2021    POCCA 1.18 09/17/2021    POCGLU 83 09/17/2021     No results found for: MMA, LDH, HOMOCYSTEINE  No components found for: LD  Lab Results   Component Value Date    TSHHS 3.444 06/17/2011     Immunology:  Lab Results   Component Value Date    PROT 5.7 (L) 10/29/2021     No results found for: Leah Akira, KLFLCR  No results found for: B2M  Coagulation Panel:  Lab Results   Component Value Date    PROTIME 13.8 08/10/2021    INR 1.07 08/10/2021    APTT 31.2 08/10/2021     Anemia Panel:  Lab Results   Component Value Date    KSUDROWZ46 >2000 (H) 09/17/2021    FOLATE 4.2 09/17/2021     Tumor Markers:  Lab Results   Component Value Date    CEA 3.5 07/21/2021    PSA 0.56 06/17/2011        Observations:  ECOG:  No data recorded       Assessment & Plan:                                                          Rectal cancer: Moderately Differentiated adenocarcinoma. 8 cm from the anal verge. MRI abdomen as above.

## 2021-11-01 NOTE — PROGRESS NOTES
MA Rooming Questions  Patient: Jose Dumont  MRN: [id number]    Date: 11/1/2021        1. Do you have any new issues?   no         2. Do you need any refills on medications? yes - pain med    3. Have you had any imaging done since your last visit?   no    4. Have you been hospitalized or seen in the emergency room since your last visit here?   no    5. Did the patient have a depression screening completed today?  No    No data recorded     PHQ-9 Given to (if applicable):               PHQ-9 Score (if applicable):                     [] Positive     []  Negative              Does question #9 need addressed (if applicable)                     [] Yes    []  No               Benigno Lea CMA

## 2021-11-08 NOTE — PROGRESS NOTES
Radiation Oncology  Treatment Completion Summary  Encounter Date: 2021 11:01 AM    Mr. Heydi Marie is a 64 y.o. male  : 1945  MRN: [id number]  [facility] Number: [de-identified]      FOLLOW UP PHYSICIANS:   Primary Care: MD Adan Palacios MD      DIAGNOSIS:  Cancer Staging  Primary adenocarcinoma of upper lobe of left lung [facility])  Staging form: Lung, AJCC 8th Edition  - Clinical: Stage IA1 (cT1a, cN0, cM0) - Signed by Bobbi Aleman MD on 2021    Rectal cancer [facility])  Staging form: Colon And Rectum, AJCC 8th Edition  - Clinical: Stage IIIB (cT3, cN1c, cM0) - Signed by Bobbi Aleman MD on 2021         TREATMENT COURSE:   Oncology History   Rectal [address] ([facility] [address] [address]   2021 Initial Diagnosis    Rectal cancer ([address]     Primary adenocarcinoma of upper lobe of left lung ([address]   2021 Initial Diagnosis    Primary adenocarcinoma of upper lobe of left lung ([address]     10/6/2021 - 10/11/2021 Radiation    Left upper lobe mass: 18 Lee SBRT in 3 fractions         HISTORY:  Heydi Marie is a 64 y.o. male with the above referenced diagnosis. Complete details on history of present illness please see my initial consultation note. The patient has recently completed a course of definitive left upper lobe radiation therapy and what follows is a description of the treatments received:    ANATOMIC SITE: Left upper lobe non-small cell lung cancer  BEAM ORIENTATION: VMAT IMRT dual arc  ENERGY: 6x FFF  DOSE PER FRACTION: 1800cGy  TOTAL DOSE: 5400cGy    ELAPSED DAYS: 5    TREATMENT TOLERANCE:   Overall, the patient tolerated radiation therapy quite well. The patient's energy level was fairly well-maintained throughout the course of treatments. No significant skin erythema developed. Breathing was well-maintained throughout the course of treatments. No significant dysphagia or odynophagia developed.       FOLLOW-UP PLANS:   The patient is to return to see me in 1 month with discussion of timing of his pelvic RT or to return sooner as needed.       Electronically signed by Gris Tejeda MD on 11/8/2021 at 11:01 AM

## 2021-11-12 NOTE — TELEPHONE ENCOUNTER
Called & spoke with pt's spouse to inform her we need to cancel his tx appt on 11/15 due to his lab counts; pt is rescheduled for lab on 11/19 @ 9:00 & 11/22 @ 8:30 for tx. Spouse voiced understanding.

## 2021-11-15 NOTE — PROGRESS NOTES
Pt showed up for tx today. I went out to speak with him as I had spoke with his wife on 11/12 to let her know his tx was being held due to his lab counts. I'm aware his spouse has dementia however she said pt was sleeping & refused to wake him up. Spouse laid the phone down to get paper & pen to write this message down; she repeated it back to me 3 times & I did inform pt of this. Pt will consider leaving a # for his neighbor when he comes for his labs on 11/19 in case his tx is delayed again. He stressed to his spouse how important it is to wake him up if our office calls. Pt was understanding of the mix up.

## 2021-11-19 NOTE — TELEPHONE ENCOUNTER
Pt called to state that he is continuing to have diarrhea. He stated that it is just a little better with the Imodium but he hasn't started the Lomotil yet. He will pick it up and start it today. He wants to wait until after Thanksgiving to have his next treatment. Will discuss with Dr Ani Ignacio and phone pt this afternoon to develop a plan for treatment after Thanksgiving.

## 2021-11-19 NOTE — TELEPHONE ENCOUNTER
Pt called to cancel his lab for his pump appt & tx for 11/22. Pt states he has diarrhea & hasn't slept all night; Rx med was called in last night which he says he started at 6 p.m with little relief. Pt is going back to bed & will call when he wakes up to speak with NN. NN April informed of this call. Pt said if he does not call back today he'll call back Monday to reschedule tx.

## 2021-11-19 NOTE — TELEPHONE ENCOUNTER
Called pt to inform him of date changes; lab for pump appt made for 11/24 @ 9:00 & tx on 11/29 @ 9:00; pt voiced understanding. DC instructions

## 2021-11-22 NOTE — TELEPHONE ENCOUNTER
Pt called to state that the antidiarrheal medication is causing him to not be able to urinate. Pt stated that after he stopped taking the lomotil, he was finally able to urinate. He continues to have diarrhea but it is somewhat improved. Pt will provide a stool specimen when he comes for labs on 11/24.

## 2021-11-24 NOTE — TELEPHONE ENCOUNTER
Pt called to cancel his lab for pump appt today stating he still has on going diarrhea; call sent to April RICO CHAHAL at 9:15 to return call to pt. Pt's tx on 11/29 cancelled also; pt has an OV on this day with Dr. Tristan Cavazos providing he keeps the appt.

## 2021-11-24 NOTE — TELEPHONE ENCOUNTER
Pt is going to [facility] on 11/26 1100 arrival for a 1230 appt-- npo 4 hours-- pt is aware of appt and stated understanding

## 2021-11-24 NOTE — TELEPHONE ENCOUNTER
Returned call to pt to discuss uncontrolled diarrhea. Pt states the lomotil helps with the diarrhea but then he cannot urinate and this is extremely uncomfortable. However, the diarrhea is almost constant and keeping him up all night. Spoke with DR Tate Rivera who ordered questran, stool for c-diff, and STAT CT scan. Pt notified. Will arrange for family to get medication for him because he states he is unable to leave the house at this time.

## 2021-11-28 PROBLEM — K52.9 COLITIS: Status: ACTIVE | Noted: 2021-01-01

## 2021-11-28 NOTE — DISCHARGE INSTR - COC
Continuity of Care Form    Patient Name: Tyler Fernandez   :  1945  MRN:  [id number]    Admit date:  2021  Discharge date:  ***    Code Status Order: No Order   Advance Directives:      Admitting Physician:  No admitting provider for patient encounter.   PCP: Halima Frye MD    Discharging Nurse: [facility] Unit/Room#: ED14/ED-14  Discharging Unit Phone Number: ***    Emergency Contact:   Extended Emergency Contact Information  Primary Emergency Contact: [facility]  Address: [address] [place] [address], [address] [place] [address] Phone: [phone]  Mobile Phone: [phone]  Relation: Spouse  Secondary Emergency Contact: Navneet Good  Mobile Phone: [phone]  Relation: Child    Past Surgical History:  Past Surgical History:   Procedure Laterality Date    CT NEEDLE BIOPSY LUNG PERCUTANEOUS  2021    CT NEEDLE BIOPSY LUNG PERCUTANEOUS 2021 [facility] CT SCAN    HAND SURGERY      IR PORT PLACEMENT EQUAL OR GREATER THAN 5 YEARS  8/10/2021    IR PORT PLACEMENT EQUAL OR GREATER THAN 5 YEARS 8/10/2021 [facility] SPECIAL PROCEDURES       Immunization History:   Immunization History   Administered Date(s) Administered    COVID-19, RAGHAV Nelson, 30mcg/0.3mL 2021, 2021    Influenza, High Dose (Fluzone 65 yrs and older) 10/14/2015, 10/27/2018    Influenza, High-dose, Quadv, 65 yrs +, IM (Fluzone) 10/19/2021       Active Problems:  Patient Active Problem List   Diagnosis Code    Rectal cancer ([facility] [address] C20    Lung nodule R91.1    Primary adenocarcinoma of upper lobe of left lung ([facility] [address] [facility] C34.12    Anemia D64.9       Isolation/Infection:   Isolation            No Isolation          Patient Infection Status       Infection Onset Added Last Indicated Last Indicated By Review Planned Expiration Resolved Resolved By    C-diff Rule Out 21 C difficile Molecular/PCR (Ordered)                Nurse Assessment:  Last Vital Signs: BP (!) 123/59   Pulse 84 Temp 97.6 °F (36.4 °C) (Oral)   Resp 15   Ht 5' 10.5\" (1.791 m)   Wt 195 lb (88.5 kg)   SpO2 98%   BMI 27.58 kg/m²     Last documented pain score (0-10 scale): Pain Level: 10  Last Weight:   Wt Readings from Last 1 Encounters:   21 195 lb (88.5 kg)     Mental Status:  {IP PT MENTAL STATUS:16060}    IV Access:  { ANNALISA IV ACCESS:180993456}    Nursing Mobility/ADLs:  Walking   {CHP DME FRZU:210951751}  Transfer  {CHP DME ZMIP:161742859}  Bathing  {CHP DME EDID:069172477}  Dressing  {CHP DME RJBP:825382764}  Toileting  {CHP DME LMLN:383314381}  Feeding  {CHP DME HKHH:525321794}  Med Admin  {CHP DME MGEL:249977560}  Med Delivery   {[facility] MED Delivery:637477681}    Wound Care Documentation and Therapy:        Elimination:  Continence: Bowel: {YES / NL:35629}  Bladder: {YES / LLUVIA:54278}  Urinary Catheter: {Urinary Catheter:729604380}   Colostomy/Ileostomy/Ileal Conduit: {YES / IU:59158}       Date of Last BM: ***  No intake or output data in the 24 hours ending 21 1335  No intake/output data recorded.     Safety Concerns:     [address] Safety Concerns:101806993}    Impairments/Disabilities:      [address] Impairments/Disabilities:925905419}    Nutrition Therapy:  Current Nutrition Therapy:   [address] Diet List:957825040}    Routes of Feeding: {CHP DME Other Feedings:869174934}  Liquids: {Slp liquid thickness:54991}  Daily Fluid Restriction: {CHP DME Yes amt example:973929706}  Last Modified Barium Swallow with Video (Video Swallowing Test): {Done Not Done FGOK:840195967}    Treatments at the Time of Hospital Discharge:   Respiratory Treatments: ***  Oxygen Therapy:  {Therapy; copd oxygen:98474}  Ventilator:    { CC Vent CRHK:086537208}    Rehab Therapies: {THERAPEUTIC INTERVENTION:5863331044}  Weight Bearing Status/Restrictions: [address] DAWN Weight Bearin}  Other Medical Equipment (for information only, NOT a DME order):  {EQUIPMENT:882370800}  Other Treatments: ***    Patient's personal belongings (please select all that are sent with patient):  {CHP DME Belongings:115357040}    RN SIGNATURE:  {Esignature:495724685}    CASE MANAGEMENT/SOCIAL WORK SECTION    Inpatient Status Date: ***    Readmission Risk Assessment Score:  Readmission Risk              Risk of Unplanned Readmission:  0           Discharging to Facility/ Agency   Name:   Address:  Phone:  Fax:    Dialysis Facility (if applicable)   Name:  Address:  Dialysis Schedule:  Phone:  Fax:    / signature: {Esignature:269288026}    PHYSICIAN SECTION    Prognosis: {Prognosis:1542969445}    Condition at Discharge: [address] [place] [address] Patient Condition:622819598}    Rehab Potential (if transferring to Rehab): {Prognosis:6388809177}    Recommended Labs or Other Treatments After Discharge: ***    Physician Certification: I certify the above information and transfer of [place]  is necessary for the continuing treatment of the diagnosis listed and that he requires {Admit to Appropriate Level of Care:72241} for {GREATER/LESS:029490251} 30 days.      Update Admission H&P: {CHP DME Changes in SPTZW:246880492}    PHYSICIAN SIGNATURE:  {Esignature:500765054}

## 2021-11-28 NOTE — Clinical Note
Patient Class: Inpatient [101]   REQUIRED: Diagnosis: Colitis [377107]   Estimated Length of Stay: Estimated stay of more than 2 midnights   Admitting Provider: Antonio Lacy [[id number]]   Telemetry/Cardiac Monitoring Required?: Yes

## 2021-11-28 NOTE — H&P
HISTORY AND PHYSICAL  (Hospitalist, Internal Medicine)  IDENTIFYING INFORMATION   PATIENT:  Moris Rose  MRN:  [id number]  ADMIT DATE: 11/28/2021      CHIEF COMPLAINT   Abdominal pain, diarrhea    HISTORY OF PRESENT ILLNESS   Moris Rose is a 64 y.o. male with coronary artery disease, hypertension, hyperlipidemia, recently diagnosed rectal adenocarcinoma currently on chemotherapy and radiation, chronic diarrhea since starting chemotherapy. Patient reported abdominal pain-mid abdomen-across umbilicus, described as sharp, initially intermittent, progressively getting worse, 10/10 intensity, associated with nausea, no vomiting, poor appetite, diarrhea. Denied any fever, chills, cough. Denied any chest pain, shortness of breath. Patient also reported having trouble urinating for the last few days. Patient reports having diarrhea for the last 3-4 months since he started chemoradiation. Noticed blood one time. Denied any other complaints. At presentation patient was noted to have BP 95/53, HR 67, RR 15, temp 97.2, saturating 93% on room air. Lab work significant for potassium 3.4, CO2 23, creatinine 1.1, magnesium 2.1, lactic acid 1.3, albumin 2.8, hemoglobin 9.9, platelets 717, lipase 11.  UA-not suggestive of infection. Chest x-ray-no acute process. CT abdomen/pelvis-left hemicolonic colitis. Diffuse colonic distention more significant at the proximal aspect with the proximal transverse colon measuring approximately 8 cm in diameter this likely relates to adynamic ileus versus post treatment stenosis with a distal obstruction. ER physician discussed with GI who recommended surgical consult. As per ED physician-was waiting for callback from surgeon, when they called me for admission. Patient received NS bolus, fentanyl 25 MCG IV in ER.   PAST MEDICAL HISTORY PAST SURGICAL HISTORY   coronary artery disease, hypertension, hyperlipidemia, recently diagnosed rectal adenocarcinoma currently on chemotherapy and radiation, chronic diarrhea. Port placement   FAMILY HISTORY SOCIAL HISTORY    Reviewed and noncontributory. Remote history of smoking, alcohol-quit 40 years ago. Denied any illicit drug use   MEDICATIONS ALLERGIES    As per EMR patient is on aspirin 81 mg daily, simvastatin 40 mg nightly, Lasix 20 mg daily, atenolol 50 mg daily, potassium chloride 20 mEq daily, gabapentin 300 mg nightly, cilostazol 50 mg twice daily, probiotic daily, Norco every 6 hours as needed, loperamide, Diphenatol, cholestyramine   no known drug allergies.        PAST MEDICAL, SURGICAL, FAMILY, and SOCIAL HISTORY         Past Medical History:   Diagnosis Date    Cancer [facility])     rectal cancer    Hyperlipidemia     Hypertension      Past Surgical History:   Procedure Laterality Date    CT NEEDLE BIOPSY LUNG PERCUTANEOUS  2021    CT NEEDLE BIOPSY LUNG PERCUTANEOUS 2021 [facility] CT SCAN    HAND SURGERY      IR PORT PLACEMENT EQUAL OR GREATER THAN 5 YEARS  8/10/2021    IR PORT PLACEMENT EQUAL OR GREATER THAN 5 YEARS 8/10/2021 [facility]     Family History   Problem Relation Age of Onset    High Blood Pressure Father     Heart Disease Father     Stroke Sister      Family Hx of HTN  Family Hx as reviewed above, otherwise non-contributory  Social History     Socioeconomic History    Marital status:      Spouse name: None    Number of children: None    Years of education: None    Highest education level: None   Occupational History    None   Tobacco Use    Smoking status: Former Smoker     Packs/day: 0.25     Years: 6.00     Pack years: 1.50     Start date: 1981     Quit date:      Years since quittin.9    Smokeless tobacco: Never Used   Substance and Sexual Activity    Alcohol use: No    Drug use: No    Sexual activity: None   Other Topics Concern    None   Social History Narrative  None     Social Determinants of Health     Financial Resource Strain:     Difficulty of Paying Living Expenses: Not on file   Food Insecurity:     Worried About Running Out of Food in the Last Year: Not on file    Dmitry of Food in the Last Year: Not on file   Transportation Needs:     Lack of Transportation (Medical): Not on file    Lack of Transportation (Non-Medical): Not on file   Physical Activity:     Days of Exercise per Week: Not on file    Minutes of Exercise per Session: Not on file   Stress:     Feeling of Stress : Not on file   Social Connections:     Frequency of Communication with Friends and Family: Not on file    Frequency of Social Gatherings with Friends and Family: Not on file    Attends Rastafari Services: Not on file    Active Member of [address] [place] [address] [employer] or Organizations: Not on file    Attends Club or Organization Meetings: Not on file    Marital Status: Not on file   Intimate Partner Violence:     Fear of Current or Ex-Partner: Not on file    Emotionally Abused: Not on file    Physically Abused: Not on file    Sexually Abused: Not on file   Housing Stability:     Unable to Pay for Housing in the Last Year: Not on file    Number of [place] in the Last Year: Not on file    Unstable Housing in the Last Year: Not on file       MEDICATIONS   Medications Prior to Admission  Not in a hospital admission.     Current Medications  Current Facility-Administered Medications   Medication Dose Route Frequency Provider Last Rate Last Admin    ondansetron (ZOFRAN) injection 4 mg  4 mg IntraVENous Q30 Min PRN Lana Europe RADHA Magallanes   4 mg at 11/28/21 1404    morphine (PF) injection 2 mg  2 mg IntraVENous Once Kristal Baldwin PA-C        ciprofloxacin (CIPRO) IVPB 400 mg  400 mg IntraVENous Q12H Hunter Jean-Baptiste MD        metronidazole (FLAGYL) 500 mg in NaCl 100 mL IVPB premix  500 mg IntraVENous Q8H Hunter Jean-Baptiste MD         Current Outpatient Medications   Medication Sig Dispense Refill    cholestyramine (QUESTRAN) 4 g packet Take 1 packet by mouth 2 times daily 90 packet 3    loperamide (RA ANTI-DIARRHEAL) 2 MG capsule Take 2 tablets with the first loose stool and then 1 tablet with each loose stool until stool starts to form. Do not take more than 8 tablets in 1 day. 60 capsule 1    diphenoxylate-atropine (DIPHENATOL) 2.5-0.025 MG per tablet Take 1 tablet by mouth 4 times daily as needed for Diarrhea for up to 15 days. 60 tablet 1    HYDROcodone-acetaminophen (NORCO) 5-325 MG per tablet Take 1 tablet by mouth every 6 hours as needed for Pain for up to 30 days. Intended supply: 30 days 120 tablet 0    cephALEXin (KEFLEX) 500 MG capsule Take 1 capsule by mouth 3 times daily 30 capsule 0    OLANZapine (ZYPREXA) 5 MG tablet One tablet po HS for four nights starting the night of chemotherapy 24 tablet 0    ondansetron (ZOFRAN) 8 MG tablet Take 1 tablet by mouth every 8 hours as needed for Nausea or Vomiting 30 tablet 1    dexamethasone (DECADRON) 4 MG tablet 1 tablet PO daily for 4 days starting the day after chemotherapy every two weeks. 16 tablet 3    clorazepate (TRANXENE) 7.5 MG tablet TAKE 1 TABLET BY MOUTH ONCE DAILY      Lactobacillus (ACIDOPHILUS PROBIOTIC FORMULA) TABS TAKE 1 TABLET BY MOUTH ONCE DAILY      pravastatin (PRAVACHOL) 40 MG tablet       traZODone (DESYREL) 100 MG tablet       simvastatin (ZOCOR) 40 MG tablet Take 40 mg by mouth nightly.  furosemide (LASIX) 20 MG tablet Take 20 mg by mouth daily.  atenolol (TENORMIN) 50 MG tablet Take 50 mg by mouth daily.  potassium chloride (KLOR-CON) 20 MEQ packet Take 20 mEq by mouth daily.  gabapentin (NEURONTIN) 300 MG capsule Take 300 mg by mouth nightly.  aspirin 81 MG chewable tablet Take 81 mg by mouth daily.  cilostazol (PLETAL) 50 MG tablet Take 1 tablet by mouth 2 times daily.  60 tablet 3         Allergies  No Known Allergies    REVIEW OF SYSTEMS   10 point review of systems conducted and pertinent positives and negatives as per HPI. PHYSICAL EXAM     Wt Readings from Last 3 Encounters:   11/28/21 195 lb (88.5 kg)   11/01/21 195 lb 6.4 oz (88.6 kg)   11/01/21 195 lb (88.5 kg)       Blood pressure (!) 141/58, pulse 92, temperature 97.6 °F (36.4 °C), temperature source Oral, resp. rate 16, height 5' 10.5\" (1.791 m), weight 195 lb (88.5 kg), SpO2 98 %. GEN  -Awake, alert, ill-appearing. EYES   -PERRL. HENT  -MM are dry. RESP  -LS CTA equal bilat, no wheezes, rales or rhonchi. Symmetric chest movement. No respiratory distress noted. C/V  -S1/S2 auscultated, tachycardia, + murmur. No JVD or carotid bruits. Peripheral pulses equal bilaterally and palpable. No peripheral edema. No reproducible chest wall tenderness. GI  -Abdomen is soft, non-distended, no significant tenderness. decreased BS in all quadrants. Rectal exam deferred.   -No CVA tenderness. Fernández catheter is not present. MS  -B/L extremities strong muscles strength. Full movements. No gross joint deformities. No swelling, intact sensation symmetrical.   SKIN  -Normal coloration, warm, dry. NEURO  - Awake, alert, oriented x 3, no focal deficits. PSYC  - Appropriate affect. LABS AND IMAGING     Results for Sarah Leal (MRN [id number]) as of 11/28/2021 20:46   Ref.  Range 11/28/2021 12:48   Sodium Latest Ref Range: 135 - 145 MMOL/L 136   Potassium Latest Ref Range: 3.5 - 5.1 MMOL/L 3.4 (L)   Chloride Latest Ref Range: 99 - 110 mMol/L 103   CO2 Latest Ref Range: 21 - 32 MMOL/L 23   BUN Latest Ref Range: 6 - 23 MG/DL 48 (H)   Creatinine Latest Ref Range: 0.9 - 1.3 MG/DL 1.1   Anion Gap Latest Ref Range: 4 - 16  10   GFR Non- Latest Ref Range: >60 mL/min/1.73m2 >60   GFR African American Latest Ref Range: >60 mL/min/1.73m2 >60   Magnesium Latest Ref Range: 1.8 - 2.4 mg/dl 2.1   Lactate, ser/plas Latest Ref Range: 0.4 - 2.0 mMOL/L 1.3   Glucose Latest Ref Range: 70 - 99 MG/ (H)   Calcium Latest Ref Range: 8.3 - 10.6 MG/DL 8.2 (L)   Total Protein Latest Ref Range: 6.4 - 8.2 GM/DL 5.2 (L)   Albumin Latest Ref Range: 3.4 - 5.0 GM/DL 2.8 (L)   Alk Phos Latest Ref Range: 40 - 129 IU/L 76   ALT Latest Ref Range: 10 - 40 U/L 7 (L)   AST Latest Ref Range: 15 - 37 IU/L 16   Bilirubin Latest Ref Range: 0.0 - 1.0 MG/DL 0.5   Lipase Latest Ref Range: 13 - 60 IU/L 11 (L)   WBC Latest Ref Range: 4.0 - 10.5 K/CU MM 8.7   RBC Latest Ref Range: 4.6 - 6.2 M/CU MM 3.13 (L)   Hemoglobin Quant Latest Ref Range: 13.5 - 18.0 GM/DL 9.9 (L)   Hematocrit Latest Ref Range: 42 - 52 % 32.3 (L)   MCV Latest Ref Range: 78 - 100 .2 (H)   MCH Latest Ref Range: 27 - 31 PG 31.6 (H)   MCHC Latest Ref Range: 32.0 - 36.0 % 30.7 (L)   MPV Latest Ref Range: 7.5 - 11.1 FL 10.1   RDW Latest Ref Range: 11.7 - 14.9 % 16.2 (H)   Platelet Count Latest Ref Range: 140 - 440 K/CU    Lymphocyte % Latest Ref Range: 24 - 44 % 9.0 (L)   Monocytes % Latest Ref Range: 0 - 4 % 10.0 (H)   Lymphocytes Absolute Latest Units: K/CU MM 0.8   Monocytes Absolute Latest Units: K/CU MM 0.9   Differential Type Unknown MANUAL DIFFERENTIAL   Segs Relative Latest Ref Range: 36 - 66 % 57.0   Segs Absolute Latest Units: K/CU MM 4.9   Bands Relative Latest Ref Range: 5 - 11 % 22 (H)   Bands Absolute Latest Units: K/CU MM 1.91   Metamyelocytes Relative Latest Ref Range: 0.0 % 2 (H)   Toxic Granulation Unknown PRESENT   Polychromasia Unknown 1+   Anisocytosis Unknown 1+   Metamyelocytes Absolute Latest Units: K/CU MM 0.17     Results for Annetta Kelly (MRN [id number]) as of 11/28/2021 20:46   Ref.  Range 11/28/2021 16:40   Color, UA Latest Ref Range: YELLOW  YELLOW   Clarity, UA Latest Ref Range: CLEAR  CLEAR   Bilirubin, Urine Latest Ref Range: NEGATIVE MG/DL NEGATIVE   Ketones, Urine Latest Ref Range: NEGATIVE MG/DL NEGATIVE   Specific Gravity, UA Latest Ref Range: 1.001 - 1.035  1.020   Blood, Urine Latest Ref Range: NEGATIVE  NEGATIVE   Protein, UA Latest Ref Range: NEGATIVE MG/DL NEGATIVE   Urobilinogen, Urine Latest Ref Range: 0.2 - 1.0 MG/DL 2.0 (H)   Leukocyte Esterase, Urine Latest Ref Range: NEGATIVE  NEGATIVE   Glucose, Urine Latest Ref Range: NEGATIVE MG/DL NEGATIVE   Nitrite Urine, Quantitative Latest Ref Range: NEGATIVE  NEGATIVE   pH, Urine Latest Ref Range: 5.0 - 8.0  5.0   WBC, UA Latest Ref Range: 0 - 2 /HPF NONE SEEN   RBC, UA Latest Ref Range: 0 - 3 /HPF NONE SEEN   Bacteria, UA Latest Ref Range: NEGATIVE /HPF NEGATIVE   Trichomonas, UA Latest Ref Range: NONE SEEN /HPF NONE SEEN     Recent Imaging    CT ABDOMEN PELVIS WO CONTRAST Additional Contrast? None [[id number]] Collected: 11/28/21 1215      Order Status: Completed Updated: 11/28/21 7302     Narrative:       EXAMINATION:   CT OF THE ABDOMEN AND PELVIS WITHOUT CONTRAST 11/28/2021 12:03 pm     TECHNIQUE:   CT of the abdomen and pelvis was performed without the administration of   intravenous contrast. Multiplanar reformatted images are provided for review. Dose modulation, iterative reconstruction, and/or weight based adjustment of   the mA/kV was utilized to reduce the radiation dose to as low as reasonably   achievable. COMPARISON:   CT abdomen/pelvis 05/25/2021.  PET/CT 08/02/2021. HISTORY:   ORDERING SYSTEM PROVIDED HISTORY: lower abdominal pain, history of rectal   cancer   TECHNOLOGIST PROVIDED HISTORY:   Reason for exam:->lower abdominal pain, history of rectal cancer   Additional Contrast?->None   Decision Support Exception - unselect if not a suspected or confirmed   emergency medical condition->Emergency Medical Condition (MA)   Reason for Exam: lower abdominal pain, history of rectal cancer   Acuity: Acute   Type of Exam: Initial     FINDINGS:   Thorax base:  Normal heart size with no significant pericardial effusion.    Coronary and aortic atherosclerotic calcifications.  Moderate aortic valve   calcifications.  Evidence of remote healed granulomatous disease.  New left hemidiaphragm elevation.  Pulmonary hyperinflation with scattered calcified   granulomas.  Mild dependent atelectasis.  No pleural effusion. Abdomen:  Extensive abdominal aortic atherosclerosis with no aneurysm   formation.  No intrahepatic mass or biliary dilatation.  Redemonstration of   numerous calcified gallstones.  No wall thickening.  Stable normal common   bile duct.  The pancreas, adrenals, kidneys, ureters, and spleen demonstrate   no acute abnormality. The stomach and small bowel are normal.  The appendix demonstrates mild fluid   opacification without wall thickening or adjacent focal inflammation.  There   is no significant cecal distention.  The ascending and transverse colon   demonstrate moderate fecal retention and gaseous distention with no   significant wall thickening.  The proximal transverse colon measures 7.9 cm   in diameter.  From the splenic flexure through the rectum there is mild   circumferential wall thickening with pericolonic fat induration and vascular   prominence.  The left hemicolon demonstrates mild gaseous distention and   moderate fecal retention.  No evidence of pneumatosis. Trace ascites.  Left peritoneal and retroperitoneal edema with no loculated   fluid collection. Pelvis:  The bladder and prostate are normal.  There is moderate   circumferential rectosigmoid wall thickening with no significant distention. No pneumatosis.  The rectosigmoid junction posterior wall irregular mass   identified on the prior exams demonstrates decreased prominence.  Mild   ascites.  Perirectal and presacral edema.  No loculated fluid collection.      Musculoskeletal structures:  Mild asymmetric subareolar soft tissue density   more prominent on the left consistent with gynecomastia.  Mild body wall   edema with no hematoma or loculated fluid collection.  Symmetric muscular   atrophy.  Decreased bone mineral density.  Diffuse thoracolumbar   paravertebral ossifications suggesting DISH.  Relative preserved disc spaces. Normal pelvic alignment with symmetric hip arthropathy.  No acute osseous   abnormality.      Impression:       1. New since the prior exam is left hemicolonic colitis. Jolayne Cancel is diffuse   colonic distention more significant at the proximal aspect with the proximal   transverse colon measuring approximately 8 cm in diameter.  This likely   relates to adynamic ileus versus post treatment stenosis with distal   obstruction. 2. The rectosigmoid posterior wall mass appears decreased in prominence since   the prior exam which suggests response to therapy. 3. Mild ascites.  No bowel perforation or abscess.      XR CHEST PORTABLE [[id number]] Collected: 11/28/21 1306     Order Status: Completed Updated: 11/28/21 1314     Narrative:       EXAMINATION:   ONE XRAY VIEW OF THE CHEST     11/28/2021 12:27 pm     COMPARISON:   Chest radiograph 08/17/2021. HISTORY:   ORDERING SYSTEM PROVIDED HISTORY: abodminal pain   TECHNOLOGIST PROVIDED HISTORY:   Reason for exam:->abodminal pain   Reason for Exam: abd pain     FINDINGS:   A right chest port terminates in the mid superior vena cava.  The   cardiomediastinal silhouette is within normal limits.  Shallow inspiration. Possible small effusions.  Mild bibasilar opacities.  No pneumothorax or   vascular congestion.  No acute osseous abnormality.      Impression:       Shallow inspiration with mild bibasilar opacities compatible with   atelectasis.  Possible small effusions.          Relevant labs and imaging reviewed    ASSESSMENT AND PLAN     #. Colitis:  -CT abdomen/pelvis-left hemicolonic colitis, diffuse colonic distention more significant at the proximal aspect with the proximal transverse colon measuring approximately 8 cm in diameter. Likely related to adynamic ileus versus posttreatment stenosis with distal obstruction.  -Initially patient was given liquid diet as he was not complaining of any vomiting. .  -As per

## 2021-11-28 NOTE — ED PROVIDER NOTES
[address] ENCOUNTER        Pt Name: Han Davila  MRN: [id number]  [place] 1945  Date of evaluation: 11/28/2021  Provider: Kristal Baldwin PA-C  PCP: Linnea Bravo MD  Note Started: 2:49 PM EST       I have seen and evaluated this patient with my supervising physician No att. providers found. Triage CHIEF COMPLAINT       Chief Complaint   Patient presents with    Abdominal Pain     receiving chemo. for rectal cancer.  Nausea    Emesis         HISTORY OF PRESENT ILLNESS   (Location/Symptom, Timing/Onset, Context/Setting, Quality, Duration, Modifying Factors, Severity)  Note limiting factors. Chief Complaint: abdominal pain    Han Davila is a 64 y.o. male who presents PMHx rectal cancer complaint of abdominal pain, he felt this worsned overnight. It is accompanied with some difficulty with urination, sensation of his bladder being filled. He describes some ongoing diarrhea over the past several months. He does mention some decreased intake with food and liquids, and accompanied with some nausea. Pt on chemo and radiation therapy his gastroenterologist Dr. Tate Rivera  last week was placed on medications for this. He denies any bloody stools, dark tarry stools, fevers, chest pain, shortness of breath. Nursing Notes were all reviewed and agreed with or any disagreements were addressed in the HPI. REVIEW OF SYSTEMS    (2-9 systems for level 4, 10 or more for level 5)     Review of Systems   Constitutional: Negative for chills and fever. HENT: Negative for congestion and rhinorrhea. Eyes: Negative for pain and visual disturbance. Respiratory: Negative for cough and shortness of breath. Cardiovascular: Negative for chest pain and leg swelling. Gastrointestinal: Positive for abdominal pain, diarrhea and nausea. Negative for vomiting.    Genitourinary: Positive for difficulty urinating. Negative for dysuria, flank pain and hematuria. Musculoskeletal: Negative for back pain and myalgias. Skin: Negative for rash and wound. Neurological: Negative for dizziness and light-headedness. PAST MEDICAL HISTORY     Past Medical History:   Diagnosis Date    Cancer [facility])     rectal cancer    Hyperlipidemia     Hypertension        SURGICAL HISTORY     Past Surgical History:   Procedure Laterality Date    CT NEEDLE BIOPSY LUNG PERCUTANEOUS  8/17/2021    CT NEEDLE BIOPSY LUNG PERCUTANEOUS 8/17/2021 [facility] CT SCAN    HAND SURGERY      IR PORT PLACEMENT EQUAL OR GREATER THAN 5 YEARS  8/10/2021    IR PORT PLACEMENT EQUAL OR GREATER THAN 5 YEARS 8/10/2021 [facility] SPECIAL PROCEDURES       CURRENTMEDICATIONS       Previous Medications    ASPIRIN 81 MG CHEWABLE TABLET    Take 81 mg by mouth daily. ATENOLOL (TENORMIN) 50 MG TABLET    Take 50 mg by mouth daily. CEPHALEXIN (KEFLEX) 500 MG CAPSULE    Take 1 capsule by mouth 3 times daily    CHOLESTYRAMINE (QUESTRAN) 4 G PACKET    Take 1 packet by mouth 2 times daily    CILOSTAZOL (PLETAL) 50 MG TABLET    Take 1 tablet by mouth 2 times daily. CLORAZEPATE (TRANXENE) 7.5 MG TABLET    TAKE 1 TABLET BY MOUTH ONCE DAILY    DEXAMETHASONE (DECADRON) 4 MG TABLET    1 tablet PO daily for 4 days starting the day after chemotherapy every two weeks. DIPHENOXYLATE-ATROPINE (DIPHENATOL) 2.5-0.025 MG PER TABLET    Take 1 tablet by mouth 4 times daily as needed for Diarrhea for up to 15 days. FUROSEMIDE (LASIX) 20 MG TABLET    Take 20 mg by mouth daily. GABAPENTIN (NEURONTIN) 300 MG CAPSULE    Take 300 mg by mouth nightly. HYDROCODONE-ACETAMINOPHEN (NORCO) 5-325 MG PER TABLET    Take 1 tablet by mouth every 6 hours as needed for Pain for up to 30 days.  Intended supply: 30 days    LACTOBACILLUS (ACIDOPHILUS PROBIOTIC FORMULA) TABS    TAKE 1 TABLET BY MOUTH ONCE DAILY    LOPERAMIDE (RA ANTI-DIARRHEAL) 2 MG CAPSULE    Take 2 tablets with the first loose stool and then 1 tablet with each loose stool until stool starts to form. Do not take more than 8 tablets in 1 day. OLANZAPINE (ZYPREXA) 5 MG TABLET    One tablet po HS for four nights starting the night of chemotherapy    ONDANSETRON (ZOFRAN) 8 MG TABLET    Take 1 tablet by mouth every 8 hours as needed for Nausea or Vomiting    POTASSIUM CHLORIDE (KLOR-CON) 20 MEQ PACKET    Take 20 mEq by mouth daily. PRAVASTATIN (PRAVACHOL) 40 MG TABLET        SIMVASTATIN (ZOCOR) 40 MG TABLET    Take 40 mg by mouth nightly. TRAZODONE (DESYREL) 100 MG TABLET           ALLERGIES     Patient has no known allergies. FAMILYHISTORY       Family History   Problem Relation Age of Onset    High Blood Pressure Father     Heart Disease Father     Stroke Sister         SOCIAL HISTORY       Social History     Socioeconomic History    Marital status:      Spouse name: None    Number of children: None    Years of education: None    Highest education level: None   Occupational History    None   Tobacco Use    Smoking status: Former Smoker     Packs/day: 0.25     Years: 6.00     Pack years: 1.50     Start date: 1981     Quit date:      Years since quittin.9    Smokeless tobacco: Never Used   Substance and Sexual Activity    Alcohol use: No    Drug use: No    Sexual activity: None   Other Topics Concern    None   Social History Narrative    None     Social Determinants of Health     Financial Resource Strain:     Difficulty of Paying Living Expenses: Not on file   Food Insecurity:     Worried About Running Out of Food in the Last Year: Not on file    Dmitry of Food in the Last Year: Not on file   Transportation Needs:     Lack of Transportation (Medical): Not on file    Lack of Transportation (Non-Medical):  Not on file   Physical Activity:     Days of Exercise per Week: Not on file    Minutes of Exercise per Session: Not on file   Stress:     Feeling of Stress : Not on file   Social focal deficit present. Mental Status: He is alert and oriented to person, place, and time. Psychiatric:         Mood and Affect: Mood normal.         Behavior: Behavior normal.         DIAGNOSTIC RESULTS   LABS:    Labs Reviewed   CBC WITH AUTO DIFFERENTIAL - Abnormal; Notable for the following components:       Result Value    RBC 3.13 (*)     Hemoglobin 9.9 (*)     Hematocrit 32.3 (*)     .2 (*)     MCH 31.6 (*)     MCHC 30.7 (*)     RDW 16.2 (*)     Metamyelocytes Relative 2 (*)     Bands Relative 22 (*)     Lymphocytes % 9.0 (*)     Monocytes % 10.0 (*)     All other components within normal limits   COMPREHENSIVE METABOLIC PANEL - Abnormal; Notable for the following components:    Potassium 3.4 (*)     BUN 48 (*)     Glucose 113 (*)     Calcium 8.2 (*)     Albumin 2.8 (*)     Total Protein 5.2 (*)     ALT 7 (*)     All other components within normal limits   LIPASE - Abnormal; Notable for the following components:    Lipase 11 (*)     All other components within normal limits   URINALYSIS - Abnormal; Notable for the following components:    Urobilinogen, Urine 2.0 (*)     All other components within normal limits   C DIFFICILE MOLECULAR/PCR   GI DISEASE PANEL PCR   LACTIC ACID, PLASMA   MAGNESIUM       When ordered, only abnormal lab results are displayed. All other labs were within normal range or not returned as of this dictation. EKG: When ordered, EKG's are interpreted by the Emergency Department Physician in the absence of a cardiologist.  Please see their note for interpretation of EKG.       RADIOLOGY:   Non-plain film images such as CT, Ultrasound and MRI are read by the radiologist. Gloriann Fran radiographic images are visualized andpreliminarily interpreted by the  ED Provider with the below findings:        Interpretation perthe Radiologist below, if available at the time of this note:    XR CHEST PORTABLE   Final Result   Shallow inspiration with mild bibasilar opacities compatible with granulomas. Mild dependent atelectasis. No pleural effusion. Abdomen:  Extensive abdominal aortic atherosclerosis with no aneurysm formation. No intrahepatic mass or biliary dilatation. Redemonstration of numerous calcified gallstones. No wall thickening. Stable normal common bile duct. The pancreas, adrenals, kidneys, ureters, and spleen demonstrate no acute abnormality. The stomach and small bowel are normal.  The appendix demonstrates mild fluid opacification without wall thickening or adjacent focal inflammation. There is no significant cecal distention. The ascending and transverse colon demonstrate moderate fecal retention and gaseous distention with no significant wall thickening. The proximal transverse colon measures 7.9 cm in diameter. From the splenic flexure through the rectum there is mild circumferential wall thickening with pericolonic fat induration and vascular prominence. The left hemicolon demonstrates mild gaseous distention and moderate fecal retention. No evidence of pneumatosis. Trace ascites. Left peritoneal and retroperitoneal edema with no loculated fluid collection. Pelvis: The bladder and prostate are normal.  There is moderate circumferential rectosigmoid wall thickening with no significant distention. No pneumatosis. The rectosigmoid junction posterior wall irregular mass identified on the prior exams demonstrates decreased prominence. Mild ascites. Perirectal and presacral edema. No loculated fluid collection. Musculoskeletal structures:  Mild asymmetric subareolar soft tissue density more prominent on the left consistent with gynecomastia. Mild body wall edema with no hematoma or loculated fluid collection. Symmetric muscular atrophy. Decreased bone mineral density. Diffuse thoracolumbar paravertebral ossifications suggesting DISH. Relative preserved disc spaces. Normal pelvic alignment with symmetric hip arthropathy. No acute osseous abnormality.      1. New since the prior exam is left hemicolonic colitis. There is diffuse colonic distention more significant at the proximal aspect with the proximal transverse colon measuring approximately 8 cm in diameter. This likely relates to adynamic ileus versus post treatment stenosis with distal obstruction. 2. The rectosigmoid posterior wall mass appears decreased in prominence since the prior exam which suggests response to therapy. 3. Mild ascites. No bowel perforation or abscess. XR CHEST PORTABLE    Result Date: 11/28/2021  EXAMINATION: ONE XRAY VIEW OF THE CHEST 11/28/2021 12:27 pm COMPARISON: Chest radiograph 08/17/2021. HISTORY: ORDERING SYSTEM PROVIDED HISTORY: abodminal pain TECHNOLOGIST PROVIDED HISTORY: Reason for exam:->abodminal pain Reason for Exam: abd pain FINDINGS: A right chest port terminates in the mid superior vena cava. The cardiomediastinal silhouette is within normal limits. Shallow inspiration. Possible small effusions. Mild bibasilar opacities. No pneumothorax or vascular congestion. No acute osseous abnormality. Shallow inspiration with mild bibasilar opacities compatible with atelectasis. Possible small effusions.          PROCEDURES   Unless otherwise noted below, none     Procedures    CRITICAL CARE TIME   N/A    CONSULTS:  IP CONSULT TO GI  IP CONSULT TO GENERAL SURGERY  IP CONSULT TO HOSPITALIST      EMERGENCY DEPARTMENT COURSE and DIFFERENTIAL DIAGNOSIS/MDM:   Vitals:    Vitals:    11/28/21 1300 11/28/21 1330 11/28/21 1400 11/28/21 1600   BP: (!) 126/59 133/65 138/63 (!) 141/58   Pulse: 79 84 85 92   Resp: 15 14 14 16   Temp:       TempSrc:       SpO2: 100% 99% 98% 98%   Weight:       Height:           Patient was given thefollowing medications:  Medications   ondansetron (ZOFRAN) injection 4 mg (4 mg IntraVENous Given 11/28/21 1404)   morphine (PF) injection 2 mg (has no administration in time range)   ciprofloxacin (CIPRO) IVPB 400 mg (has no administration in time range) metronidazole (FLAGYL) 500 mg in NaCl 100 mL IVPB premix (has no administration in time range)   0.9 % sodium chloride bolus (0 mLs IntraVENous Stopped 11/28/21 1655)   fentaNYL (SUBLIMAZE) injection 25 mcg (25 mcg IntraVENous Given 11/28/21 1405)           73-year-old male known history of rectal carcinoma, patient presents with above HPI. Initial work-up and imaging placed during triage. On my examination patient is alert answering questions appropriately. No acute distress. Initially, he had been unable to void, but since then, he states that he had voided, but was unable to provide a urine specimen  Reviewing EMR, patient had contacted oncology actually 4 days ago, who had spoke with Dr. Russell Rincon, who ordered Questran, C. difficile stool sample, and CT scan which is pending. @14:50 CT scan without contrast shows new left hemicolon colitis with diffuse colonic distention likely relating to adynamic ileus versus posttreatment stenosis with distal obstruction no bowel perforation or abscess. Discussed with Dr. Tamara Johns, will plan for surgery and gi consult, and likely admission for abdominal pain, colitis, potential ileus versus obstruction, urinary retention. @18:06 patient was discussed with gastroenterologist Bri Carranza who also was able to review the CT results. He advised surgical consult, and if patient prefers a trial of p.o. Flagyl and Cipro, but if admission for IV antibiotics and IV fluids. I did discuss patient again with Dr. Tamara Johns who is advising for admission. This plan was discussed with patient who is agreeable to this. Surgical consult pending at this time.   We will plan for admission for the hospitalist, meanwhile, analgesics, IV antibiotics    @18:40 patient, CT findings, were discussed with  on-call general surgeon Dr. To Adames who is covering for patient surgeon Dr. Lisseth Gotti She did agree to consult and had advised for npo, IV abx, and admission, and  likely nonsurgical treatment at this time and likely related to chemo radiation treatments, but that patient may need or benefit from rectal splint by, but will need c.diff work up.  we need to be worked up for C. Difficile. I also discussed with hospitalist Dr. Claudine Rendon who accept the patient. FINAL IMPRESSION      1. Colitis          DISPOSITION/PLAN   DISPOSITION        PATIENT REFERREDTO:  No follow-up provider specified.     DISCHARGE MEDICATIONS:  New Prescriptions    No medications on file       DISCONTINUED MEDICATIONS:  Discontinued Medications    No medications on file              (Please note that portions ofthis note were completed with a voice recognition program.  Efforts were made to edit the dictations but occasionally words are mis-transcribed.)    Jessica Wray PA-C (electronically signed)             Jessica Wray PA-C  11/28/21 [id number]

## 2021-11-28 NOTE — ED TRIAGE NOTES
Patient to triage with c/o abd. Pain and n/v. Patient is receiving treatment for rectal cancer at this time. resps even and unlabored.

## 2021-11-29 NOTE — ED NOTES
Patient has moved into the martinez in a chair.  Provider perfect served about discharge     Brian Riggs RN  11/29/21 [id number]

## 2021-11-29 NOTE — ED NOTES
XR CHEST PORTABLE    Status: Final result       Order Providers    Authorizing Billing   RADHA Daugherty MD            Signed by    Signed Date/Time Phone Pager   Donell Funes 11/28/2021  1:12 [phone]      Reading Providers    Read Date Phone Pager   Donell Funes Sun Nov 28, 2021  1:12 [phone]        XR CHEST PORTABLE: Patient Communication     Not Released  Not seen     Radiation Dose Estimates    No radiation information found for this patient  Narrative   EXAMINATION:   ONE XRAY VIEW OF THE CHEST       11/28/2021 12:27 pm       COMPARISON:   Chest radiograph 08/17/2021.       HISTORY:   ORDERING SYSTEM PROVIDED HISTORY: abodminal pain   TECHNOLOGIST PROVIDED HISTORY:   Reason for exam:->abodminal pain   Reason for Exam: abd pain       FINDINGS:   A right chest port terminates in the mid superior vena cava.  The   cardiomediastinal silhouette is within normal limits.  Shallow inspiration. Possible small effusions.  Mild bibasilar opacities.  No pneumothorax or   vascular congestion.  No acute osseous abnormality.           Impression   Shallow inspiration with mild bibasilar opacities compatible with   atelectasis.  Possible small effusions.           Rock William RN  11/29/21 [id number]

## 2021-11-29 NOTE — ED NOTES
Dr Ford Camarillo saw patient. Ok to d/c.  Notified hospitalist via [facility], [facility]  11/29/21 [phone]

## 2021-11-29 NOTE — CONSULTS
[address] [facility] Gastroenterology  Gastroenterology Consultation    2021  7:48 AM    Patient:    Ki Nunn  : 1945   64 y.o. MRN: [id number]  Admitted: 2021 11:55 AM ATT: Halima Espinoza MD   ED14/ED-14  AdmitDx: Colitis [K52.9]  PCP: Gisselle Lee MD    Reason for Consult:  Abdominal pain and colitis    Impression and RECOMMENDATIONS:  Metastatic rectal adenocarcinoma  Therapy as per oncology and radiation    Diarrhea multifactorial may be some colitis from radiation may also have some infectious colitis  Agree with Cipro and Flagyl for at least 10 days    Abdominal pain resolved    At this point patient is keen to go home his abdominal examination is benign I am okay for him to go home and follow-up with me in with oncology as outpatient he has been advised to come back if symptoms worsens    Discussed with nursing staff and discussed with patient       Patient Active Problem List   Diagnosis Code    Rectal cancer (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 [address] C34.12    Anemia D64.9    Colitis K52.9             Requesting Physician:  Halima Espinoza MD      History Obtained From:  Patient and review of all records    HISTORY OF PRESENT ILLNESS:                The patient is a 64 y.o. male with significant past medical history as below who presents with above mentioned causes in reason for consult. Elderly gentleman with rectal adenocarcinoma and lung adenocarcinoma admitted with abdominal pain and diarrhea. Diarrhea ongoing for at least 2 months since the diagnosis of rectal adenocarcinoma on chemoradiation. Reports some difficulty urination. Abdominal pain improved since passing urine and also feeling much better now.   CT scan shows some evidence of mild nonspecific left-sided colitis  Patient denies GI bleed  At this point when I saw the patient in the ER he says he is feeling well he wants to go home. Past Medical History:        Diagnosis Date    Cancer [facility])     rectal cancer    Hyperlipidemia     Hypertension        Past Surgical History:        Procedure Laterality Date    CT NEEDLE BIOPSY LUNG PERCUTANEOUS  8/17/2021    CT NEEDLE BIOPSY LUNG PERCUTANEOUS 8/17/2021 [facility] CT SCAN    HAND SURGERY      IR PORT PLACEMENT EQUAL OR GREATER THAN 5 YEARS  8/10/2021    IR PORT PLACEMENT EQUAL OR GREATER THAN 5 YEARS 8/10/2021 [facility] SPECIAL PROCEDURES         Current Medications:    Medications    Prior to Admission medications    Medication Sig Start Date End Date Taking? Authorizing Provider   cholestyramine (QUESTRAN) 4 g packet Take 1 packet by mouth 2 times daily 11/24/21   Abby Alberto MD   loperamide (RA ANTI-DIARRHEAL) 2 MG capsule Take 2 tablets with the first loose stool and then 1 tablet with each loose stool until stool starts to form. Do not take more than 8 tablets in 1 day. 11/18/21   Abby Alberto MD   diphenoxylate-atropine (DIPHENATOL) 2.5-0.025 MG per tablet Take 1 tablet by mouth 4 times daily as needed for Diarrhea for up to 15 days. 11/18/21 12/3/21  Abby Alberto MD   HYDROcodone-acetaminophen (NORCO) 5-325 MG per tablet Take 1 tablet by mouth every 6 hours as needed for Pain for up to 30 days. Intended supply: 30 days 11/1/21 12/1/21  Abby Alberto MD   cephALEXin (KEFLEX) 500 MG capsule Take 1 capsule by mouth 3 times daily 10/21/21   Usha Stallworth MD   OLANZapine (ZYPREXA) 5 MG tablet One tablet po HS for four nights starting the night of chemotherapy 9/20/21   Abby Alberto MD   ondansetron (ZOFRAN) 8 MG tablet Take 1 tablet by mouth every 8 hours as needed for Nausea or Vomiting 9/20/21   Abby Alberto MD   dexamethasone (DECADRON) 4 MG tablet 1 tablet PO daily for 4 days starting the day after chemotherapy every two weeks.  9/20/21   Abby Alberto MD   clorazepate (TRANXENE) 7.5 MG tablet TAKE 1 TABLET BY MOUTH ONCE DAILY 8/14/21   Historical Provider, MD   Lactobacillus (ACIDOPHILUS PROBIOTIC FORMULA) TABS TAKE 1 TABLET BY MOUTH ONCE DAILY 8/28/21   Historical Provider, MD   pravastatin (PRAVACHOL) 40 MG tablet  8/5/21   Historical Provider, MD   traZODone (DESYREL) 100 MG tablet  5/20/21   Historical Provider, MD   simvastatin (ZOCOR) 40 MG tablet Take 40 mg by mouth nightly. Historical Provider, MD   furosemide (LASIX) 20 MG tablet Take 20 mg by mouth daily. Historical Provider, MD   atenolol (TENORMIN) 50 MG tablet Take 50 mg by mouth daily. Historical Provider, MD   potassium chloride (KLOR-CON) 20 MEQ packet Take 20 mEq by mouth daily. Historical Provider, MD   gabapentin (NEURONTIN) 300 MG capsule Take 300 mg by mouth nightly. Historical Provider, MD   aspirin 81 MG chewable tablet Take 81 mg by mouth daily. Historical Provider, MD   cilostazol (PLETAL) 50 MG tablet Take 1 tablet by mouth 2 times daily. 1/13/14   Joann Campbell MD      Scheduled Medications:    ciprofloxacin  400 mg IntraVENous Q12H    metroNIDAZOLE  500 mg IntraVENous Q8H    sodium chloride flush  5-40 mL IntraVENous 2 times per day    aspirin  81 mg Oral Daily    atenolol  50 mg Oral Daily    cilostazol  50 mg Oral BID    gabapentin  300 mg Oral Nightly    lactobacillus  1 capsule Oral Daily    atorvastatin  20 mg Oral Daily    enoxaparin  40 mg SubCUTAneous Daily     Infusions:    sodium chloride       PRN Medications: sodium chloride flush, sodium chloride, ondansetron **OR** ondansetron, polyethylene glycol, acetaminophen **OR** acetaminophen, HYDROcodone-acetaminophen, morphine  Allergies: No Known Allergies      Allergies:  Patient has no known allergies. Social History:   TOBACCO:   reports that he quit smoking about 35 years ago. He started smoking about 40 years ago. He has a 1.50 pack-year smoking history. He has never used smokeless tobacco.  ETOH:   reports no history of alcohol use.     Family History:       Problem Relation Age of Onset  High Blood Pressure Father     Heart Disease Father     Stroke Sister    No family history of colon cancer, Crohn's disease, or ulcerative colitis. REVIEW OF SYSTEMS:      Neg apart from HPI    PHYSICAL EXAM:      Vitals:    BP (!) 143/70   Pulse 81   Temp 97.6 °F (36.4 °C) (Oral)   Resp 20   Ht 5' 10.5\" (1.791 m)   Wt 195 lb (88.5 kg)   SpO2 97%   BMI 27.58 kg/m²     General Appearance:    Alert, cooperative, no distress, appears stated age   HEENT:    Normocephalic, atraumatic, PERRL, Conjunctivapale , Ears Normal, Normal nares,  gums normal   Neck:   Supple, no JVD, No lymph nodes   Lungs:     Clear to auscultation bilaterally,    Chest Wall:    No tenderness or deformity    Heart:     S1 and S2 normal,   Abdomen:     Soft, non-tender, bowel sounds active all four quadrants,     no masses, no organomegaly, no ascitis   Rectal:    Patient refused   Extremities:  , no cyanosis or edema       Skin:   Skin , no major lesions   Lymph nodes:   No abnormality   Neurologic:   No focal deficits, moving all four extremities            DATA:    ABGs: No results found for: PHART, PO2ART, XDY4UEM  CBC:   Recent Labs     11/28/21  1248   WBC 8.7   HGB 9.9*        BMP:    Recent Labs     11/28/21  1248      K 3.4*      CO2 23   BUN 48*   CREATININE 1.1   GLUCOSE 113*     Magnesium:   Lab Results   Component Value Date    MG 2.1 11/28/2021     Hepatic:   Recent Labs     11/28/21  1248   AST 16   ALT 7*   BILITOT 0.5   ALKPHOS 76     Recent Labs     11/28/21  1248   LIPASE 11*     No results for input(s): PROTIME, INR in the last 72 hours. No results for input(s): PTT in the last 72 hours. Lipids: No results for input(s): CHOL, HDL in the last 72 hours. Invalid input(s): LDLCALCU  INR: No results for input(s): INR in the last 72 hours.   TSH: No results found for: TSH  No intake or output data in the 24 hours ending 11/29/21 0748   sodium chloride         Imaging Studies  reviewed        Nicky Lockett MD  11/29/2021  7:48 AM

## 2021-11-29 NOTE — DISCHARGE SUMMARY
Hospital Medicine Discharge Summary      Patient ID: Tyler Fernandez      Patient's PCP: Halima Frye MD    Admit Date: 11/28/2021     Discharge Date:    11/29/21    Admitting Physician: Gloriann Kussmaul, MD    Discharge Physician: Shannan Garcia MD     Discharge Diagnoses/Hospital Course:    Abdominal pain and colitis     Impression and RECOMMENDATIONS:  Metastatic rectal adenocarcinoma  Therapy as per oncology and radiation     Diarrhea multifactorial may be some colitis from radiation may also have some infectious colitis  Agree with Cipro and Flagyl for at least 10 days     Abdominal pain resolved     At this point patient is keen to go home his abdominal examination is benign I am okay for him to go home and follow-up with Dr. Andrey Jimenez and with oncology as outpatient he has been advised to come back if symptoms worsens     Discussed with nursing staff and discussed with patient         #. Colitis:  -CT abdomen/pelvis-left hemicolonic colitis, diffuse colonic distention more significant at the proximal aspect with the proximal transverse colon measuring approximately 8 cm in diameter. Likely related to adynamic ileus versus posttreatment stenosis with distal obstruction. Seen by gen surg and GI. Improved with IV cipro/flagyl, c diff and GI panel pending at discharge, follow up with GI and onc  -Ordered ciprofloxacin, metronidazole.     #. Chronic diarrhea:  -Patient reported having chronic diarrhea since starting chemo/radiation.  -Had one episode of bleeding. Currently no melena or blood in stool. -GI disease panel ordered.  -Continue ciprofloxacin, metronidazole.     #.  Mild hypokalemia--treated with potassium     #. Chronic macrocytic anemia. stable     #. coronary artery disease  -Cath-1/2014-LAD 50-60% stenosis, diagonal small size vessel-diffuse 90% stenosis. ,  Left circumflex mild disease, RCA mild disease.  -Continue atenolol, aspirin, simvastatin      #.  Hypertension-patient is on atenolol.     #. Hyperlipidemia-simvastatin     #. recently diagnosed rectal adenocarcinoma currently on chemotherapy and radiation.  -Consult Dr. José Early.     #. Moderate protein calorie malnutrition with albumin 2.8.             Active Hospital Problems    Diagnosis Date Noted    Colitis [K52.9] 11/28/2021           Consults:     IP CONSULT TO GI  IP CONSULT TO GENERAL SURGERY  IP CONSULT TO HOSPITALIST    Significant Diagnostic Studies: as above, and as follows:    Treatments: as above    Disposition: home    Discharged Condition: Stable    Code Status: Full Code    Activity: activity as tolerated    Diet: regular diet    Follow Up: Primary Care Physician in 1 week  Dr. José Early with gi and onc in 2 days    The patient was seen and examined on day of discharge. Exam:   General appearance: No apparent distress, appears stated age and cooperative. HEENT Normal cephalic, atraumatic without obvious deformity. Pupils equal, round, and reactive to light. Extra ocular muscles intact. Conjunctivae/corneas clear. Neck: Supple, No jugular venous distention/bruits. Trachea midline without thyromegaly or adenopathy with full range of motion. Lungs: Clear to ascultation, bilaterally without Rales/Wheezes/Rhonchi with good respiratory effort. Heart: Regular rate and rhythm with Normal S1/S2 without  murmurs, rubs or gallops, point of maximum impulse non-displaced  Abdomen: Soft, non-tender or non-distended without rigidity or guarding and positive bowel sounds all four quadrants. Extremities: No clubbing, cyanosis, or edema bilaterally. Full range of motion without deformity and normal gait intact. Skin: Skin color, texture, turgor normal.  No rashes or lesions. Neurologic: Alert and oriented X 3,  neurovascularly intact with sensory/motor intact upper extremities/lower extremities, bilaterally.  Cranial nerves:II-XII intact, grossly non-focal.  Mental status: Alert, oriented, thought content appropriate    Labs: For convenience and continuity at follow-up the following most recent labs are provided:    CBC:   Lab Results   Component Value Date    WBC 9.2 11/29/2021    HGB 10.5 11/29/2021    HCT 33.6 11/29/2021     11/29/2021       RENAL:   Lab Results   Component Value Date     11/28/2021    K 3.4 11/28/2021     11/28/2021    CO2 23 11/28/2021    BUN 48 11/28/2021    CREATININE 1.1 11/28/2021           Discharge Medications:   @DISCHARGEMEDSLIST(<NOROUTINE> error)@       Time Spent on discharge was 45 minutes in the examination, evaluation, counseling and review of medications and discharge plan. Signed:  Alma Dee MD   11/29/2021      Thank you Susan Dickey MD for the opportunity to be involved in this patient's care. If you have any questions or concerns please feel free to contact me at [phone].

## 2021-11-29 NOTE — ED NOTES
CT ABDOMEN PELVIS WO CONTRAST Additional Contrast? None    Status: Final result       Order Providers    Authorizing Billing   RADHA Lyons MD            Signed by    Signed Date/Time Phone Pager   Nish Weller 11/28/2021  1:35 [phone]      Reading Providers    Read Date Phone Pager   Ivelisse Beaulieu Nov 28, 2021 12:25 [phone]        CT ABDOMEN PELVIS WO CONTRAST Additional Contrast? None: Patient Communication     Not Released  Not seen     Radiation Dose Estimates    No radiation information found for this patient  Narrative   EXAMINATION:   CT OF THE ABDOMEN AND PELVIS WITHOUT CONTRAST 11/28/2021 12:03 pm       TECHNIQUE:   CT of the abdomen and pelvis was performed without the administration of   intravenous contrast. Multiplanar reformatted images are provided for review. Dose modulation, iterative reconstruction, and/or weight based adjustment of   the mA/kV was utilized to reduce the radiation dose to as low as reasonably   achievable.       COMPARISON:   CT abdomen/pelvis 05/25/2021.  PET/CT 08/02/2021.       HISTORY:   ORDERING SYSTEM PROVIDED HISTORY: lower abdominal pain, history of rectal   cancer   TECHNOLOGIST PROVIDED HISTORY:   Reason for exam:->lower abdominal pain, history of rectal cancer   Additional Contrast?->None   Decision Support Exception - unselect if not a suspected or confirmed   emergency medical condition->Emergency Medical Condition (MA)   Reason for Exam: lower abdominal pain, history of rectal cancer   Acuity: Acute   Type of Exam: Initial       FINDINGS:   Thorax base:  Normal heart size with no significant pericardial effusion.    Coronary and aortic atherosclerotic calcifications.  Moderate aortic valve   calcifications.  Evidence of remote healed granulomatous disease.  New left   hemidiaphragm elevation.  Pulmonary hyperinflation with scattered calcified   granulomas.  Mild dependent atelectasis.  No pleural effusion.     Abdomen:  Extensive abdominal aortic atherosclerosis with no aneurysm   formation.  No intrahepatic mass or biliary dilatation.  Redemonstration of   numerous calcified gallstones.  No wall thickening.  Stable normal common   bile duct.  The pancreas, adrenals, kidneys, ureters, and spleen demonstrate   no acute abnormality.       The stomach and small bowel are normal.  The appendix demonstrates mild fluid   opacification without wall thickening or adjacent focal inflammation.  There   is no significant cecal distention.  The ascending and transverse colon   demonstrate moderate fecal retention and gaseous distention with no   significant wall thickening.  The proximal transverse colon measures 7.9 cm   in diameter.  From the splenic flexure through the rectum there is mild   circumferential wall thickening with pericolonic fat induration and vascular   prominence.  The left hemicolon demonstrates mild gaseous distention and   moderate fecal retention.  No evidence of pneumatosis.       Trace ascites.  Left peritoneal and retroperitoneal edema with no loculated   fluid collection.       Pelvis:  The bladder and prostate are normal.  There is moderate   circumferential rectosigmoid wall thickening with no significant distention. No pneumatosis.  The rectosigmoid junction posterior wall irregular mass   identified on the prior exams demonstrates decreased prominence.  Mild   ascites.  Perirectal and presacral edema.  No loculated fluid collection.       Musculoskeletal structures:  Mild asymmetric subareolar soft tissue density   more prominent on the left consistent with gynecomastia.  Mild body wall   edema with no hematoma or loculated fluid collection.  Symmetric muscular   atrophy.  Decreased bone mineral density.  Diffuse thoracolumbar   paravertebral ossifications suggesting DISH.  Relative preserved disc spaces.    Normal pelvic alignment with symmetric hip arthropathy.  No acute osseous   abnormality.         Impression   1. New since the prior exam is left hemicolonic colitis. Daphane Flies is diffuse   colonic distention more significant at the proximal aspect with the proximal   transverse colon measuring approximately 8 cm in diameter.  This likely   relates to adynamic ileus versus post treatment stenosis with distal   obstruction. 2. The rectosigmoid posterior wall mass appears decreased in prominence since   the prior exam which suggests response to therapy. 3. Mild ascites.  No bowel perforation or abscess.           Justin العلي RN  11/29/21 [id number]

## 2021-11-29 NOTE — ED NOTES
This RN attempted morning labs, morning vitals, and re attempted to administer IV ABX at this time. Pt refused stating, \"I want to speak to the dr before anything happens\".      Sandoval Cervantes RN  11/29/21 [id number]

## 2021-11-29 NOTE — ED NOTES
Lelia Cabrera NP responded to perfect serve at this time. SONIYA Cabrera will be down to ED 14 to speak with pt.       Mynor Gaona RN  11/29/21 [id number]

## 2021-12-01 PROBLEM — K59.1 FUNCTIONAL DIARRHEA: Status: ACTIVE | Noted: 2021-01-01

## 2021-12-01 NOTE — PROGRESS NOTES
rectum, in keeping with the patient's known   history of rectal carcinoma.       There localized mesial rectal lymph nodes and implants, which are concerning   for localized spread of the disease.       No distant metastatic disease is seen within the abdomen pelvis. 6/10/21: CT chest:  1. Scattered noncalcified solid pulmonary nodules the largest measuring up to   9 mm in left upper lobe.  No prior exams are available for comparison. Metastatic disease cannot be excluded. 2. No other acute cardiopulmonary findings. 6/18/21: MRI pelvis:  1. Approximate 4.6 cm mass of the rectum extending 1 cm beyond the muscularis   propria, T3c.   2. The MRI circumferential resection margin is approximately 10 mm as   described above. 3. Apparent noncontiguous extramural venous invasion as well as to suspicious   mesorectal lymph nodes, N1c. Then he missed several appointments for port placement and treatment as plan was to start FOLFOX in the meanwhile    8/2/21: PET:   1. Intensely hypermetabolic rectal mass is consistent with primary   malignancy.  Several prominent mesorectal lymph nodes with mild uptake are   likely metastatic as noted on recent MRI. 2. Dominant 9 mm pulmonary nodule in the posterior left upper lobe   demonstrates moderate uptake while additional smaller nodules demonstrate no   significant uptake but are below the threshold for accurate PET evaluation. Findings are suspicious for pulmonary metastatic disease. 8/17/21:  Final Pathologic Diagnosis:   Left lung upper lobe mass, needle core biopsy:        -     NON-SMALL CELL CARCINOMA, MOST CONSISTENT WITH   LUNG ADENOCARCINOMA. CK AE1/AE3: Positive in the tumor cells. CK7: Positive in the tumor cells. TTF-1: Positive in the tumor cells. Napsin-A: Positive in the tumor cells. CK 5/6: Shows staining in rare tumor cells. P40: Negative in the tumor cells. 9/20/21: Received C1D1 FOLFOX.     SBRT to the lung  10/6, 10/8 and Refill    ciprofloxacin (CIPRO) 500 MG tablet Take 1 tablet by mouth 2 times daily for 10 days 20 tablet 0    metroNIDAZOLE (FLAGYL) 500 MG tablet Take 1 tablet by mouth 3 times daily for 10 days 30 tablet 0    cholestyramine (QUESTRAN) 4 g packet Take 1 packet by mouth 2 times daily 90 packet 3    loperamide (RA ANTI-DIARRHEAL) 2 MG capsule Take 2 tablets with the first loose stool and then 1 tablet with each loose stool until stool starts to form. Do not take more than 8 tablets in 1 day. 60 capsule 1    diphenoxylate-atropine (DIPHENATOL) 2.5-0.025 MG per tablet Take 1 tablet by mouth 4 times daily as needed for Diarrhea for up to 15 days. 60 tablet 1    HYDROcodone-acetaminophen (NORCO) 5-325 MG per tablet Take 1 tablet by mouth every 6 hours as needed for Pain for up to 30 days. Intended supply: 30 days 120 tablet 0    cephALEXin (KEFLEX) 500 MG capsule Take 1 capsule by mouth 3 times daily 30 capsule 0    OLANZapine (ZYPREXA) 5 MG tablet One tablet po HS for four nights starting the night of chemotherapy 24 tablet 0    dexamethasone (DECADRON) 4 MG tablet 1 tablet PO daily for 4 days starting the day after chemotherapy every two weeks. 16 tablet 3    clorazepate (TRANXENE) 7.5 MG tablet TAKE 1 TABLET BY MOUTH ONCE DAILY      Lactobacillus (ACIDOPHILUS PROBIOTIC FORMULA) TABS TAKE 1 TABLET BY MOUTH ONCE DAILY      traZODone (DESYREL) 100 MG tablet       simvastatin (ZOCOR) 40 MG tablet Take 40 mg by mouth nightly.  atenolol (TENORMIN) 50 MG tablet Take 50 mg by mouth daily.  gabapentin (NEURONTIN) 300 MG capsule Take 300 mg by mouth nightly.  aspirin 81 MG chewable tablet Take 81 mg by mouth daily.  cilostazol (PLETAL) 50 MG tablet Take 1 tablet by mouth 2 times daily. 60 tablet 3     No current facility-administered medications on file prior to visit. Interval history:12/1/21: Arrived with his wife to the clinic today.   Reported that he continues to have abdominal pain and also diarrhea. Bottom is very sore. Feels very weak and tired. Lost about 14 pounds in the past month with very poor appetite. Feels dizzy. Has fallen. Denied any  symptoms. Denied any fever. Denied any overt bleeding though. Review of Systems:  As per interval history, otherwise ros neg.       Vital Signs: /67 (Site: Right Upper Arm, Position: Sitting, Cuff Size: Medium Adult)   Pulse 83   Temp 96.3 °F (35.7 °C) (Infrared)   Resp 16   Ht 5' 9.5\" (1.765 m)   Wt 181 lb 5.4 oz (82.3 kg)   SpO2 98%   BMI 26.40 kg/m²      CONSTITUTIONAL: awake, alert, poor hygiene, very tired appearing, thinly built  EYES: PEBBLES, Pallor+  ENT: ATNC  NECK: No JVD  HEMATOLOGIC/LYMPHATIC: no cervical, supraclavicular or axillary lymphadenopathy   LUNGS: CTAB  CARDIOVASCULAR: s1s2 rrr no murmurs  ABDOMEN: soft nondistended but mild tenderness to palpation in the lower abdominal area bs pos  MUSCULOSKELETAL: full range of motion noted, tone is normal  NEUROLOGIC: GI  SKIN:rash  EXTREMITIES: no LE edema bilaterally     Labs:  Hematology:  Lab Results   Component Value Date    WBC 9.2 11/29/2021    RBC 3.25 (L) 11/29/2021    HGB 10.5 (L) 11/29/2021    HCT 33.6 (L) 11/29/2021    .4 (H) 11/29/2021    MCH 32.3 (H) 11/29/2021    MCHC 31.3 (L) 11/29/2021    RDW 15.9 (H) 11/29/2021     11/29/2021    MPV 9.9 11/29/2021    BANDSPCT 23 (H) 11/29/2021    SEGSPCT 47.0 11/29/2021    EOSRELPCT 4.4 (H) 11/12/2021    BASOPCT 0.9 11/12/2021    LYMPHOPCT 16.0 (L) 11/29/2021    MONOPCT 13.0 (H) 11/29/2021    BANDABS 2.12 11/29/2021    SEGSABS 4.3 11/29/2021    EOSABS 0.1 11/12/2021    BASOSABS 0.0 11/12/2021    LYMPHSABS 1.5 11/29/2021    MONOSABS 1.2 11/29/2021    DIFFTYPE MANUAL DIFFERENTIAL 11/29/2021    ANISOCYTOSIS 1+ 11/29/2021    POLYCHROM 1+ 11/29/2021     No results found for: ESR  Chemistry:  Lab Results   Component Value Date     11/29/2021    K 3.5 11/29/2021     11/29/2021    CO2 25 11/29/2021 BUN 33 (H) 11/29/2021    CREATININE 0.9 11/29/2021    GLUCOSE 161 (H) 11/29/2021    CALCIUM 8.7 11/29/2021    PROT 5.2 (L) 11/28/2021    LABALBU 2.8 (L) 11/28/2021    BILITOT 0.5 11/28/2021    ALKPHOS 76 11/28/2021    AST 16 11/28/2021    ALT 7 (L) 11/28/2021    LABGLOM >60 11/29/2021    GFRAA >60 11/29/2021    MG 2.0 11/29/2021    POCCA 1.18 09/17/2021    POCGLU 83 09/17/2021     No results found for: MMA, LDH, HOMOCYSTEINE  No components found for: LD  Lab Results   Component Value Date    TSHHS 3.444 06/17/2011     Immunology:  Lab Results   Component Value Date    PROT 5.2 (L) 11/28/2021     No results found for: Parvin Zaldivar, KLFLCR  No results found for: B2M  Coagulation Panel:  Lab Results   Component Value Date    PROTIME 13.8 08/10/2021    INR 1.07 08/10/2021    APTT 31.2 08/10/2021     Anemia Panel:  Lab Results   Component Value Date    ICSGBFEO01 >2000 (H) 09/17/2021    FOLATE 4.2 09/17/2021     Tumor Markers:  Lab Results   Component Value Date    CEA 3.5 07/21/2021    PSA 0.56 06/17/2011        Observations:  ECOG:  No data recorded       Assessment & Plan:                                                          Rectal cancer: Moderately Differentiated adenocarcinoma. 8 cm from the anal verge. MRI abdomen as above. CT chest with small pulmonary nodules largest being 9 mm. No prior CT scans to compare. The plan was to obtain PET scan, consult with surgery(he did not wish to be referred to [place]), consult with radiation oncology for further treatment plan and in the meantime start chemotherapy with FOLFOX. July 2021 CEA 3.5  But he was very complaint with appointments and did finally get the PET on 8/2/21 and biopsy on 8/17 was done. S/p  SBRT lung nodule and now  Recommend complete neoadjuvant chemotherapy followed by chemoradiation followed by surgery,   Looks like he is not able to tolerate chemotherapy with adverse side effects and also worsening diarrhea.   Recommend proceeding with chemoradiation with Xeloda. Discussed adverse effects. OCM requested. Possible colitis on last CT scan. Ciprofloxacin and metronidazole prescribed. Recommend probiotics. Recommended continuation of the Imodium as needed as C. difficile negative    Very poor nutrition, low albumin. Improve nutritional status and also prescribe Marinol. Discussed adverse effects including but not limited to thromboembolism. Anemia: Most probably sec to underlying malignancy, poor nutrition, chemotherapy. Consider RAKESH. Diarrhea, nausea, emesis. As mentioned above antidiarrheal agents as needed and also adequate antiemetic regimen. Dizziness, orthostatic hypotension. Recommend IV fluids and dexamethasone and antiemetics today and also on Friday. NADIA adeno ca: S/P SRS. Is followed by Dr Jenniffer Lancaster    Rectal pain: Adequate analgesic regimen and Adequate bowel regimen. Continue other medical care. Discussed above findings and plan with him and he voiced understanding. Answered all his questions. Discussed healthy lifestyle including healthy diet, regular exercise as tolerated. Also discussed importance of being up-to-date with age-appropriate screening tools. Recommend follow-up with primary care physician and other specialists. Please do not hesitate to contact us if you need further information. Return to clinic next Tuesday or earlier if new Sx    I have recommended that the patient follow CDC guidelines for prevention of COVID-19 infection. He says he has received covid vaccine. Received FLU vaccine. Has not received Booster yet.      [address] Bernadette Mccain

## 2021-12-01 NOTE — PROGRESS NOTES
MA Rooming Questions  Patient: Tyler Fernandez  MRN: [id number]    Date: 12/1/2021        1. Do you have any new issues? yes - Extreme Fatigue, Nausea x3 days,not eating - only had pudding and boost x2 weeks. Not urinating much, loose green/black stools q1-2hrs       2. Do you need any refills on medications?    no    3. Have you had any imaging done since your last visit? yes - CT scan, chest xray    4. Have you been hospitalized or seen in the emergency room since your last visit here?   yes - [facility]    5. Did the patient have a depression screening completed today?  No    No data recorded     PHQ-9 Given to (if applicable):               PHQ-9 Score (if applicable):                     [] Positive     []  Negative              Does question #9 need addressed (if applicable)                     [] Yes    []  No               Kwaku Patrick CMA

## 2021-12-02 NOTE — ED PROVIDER NOTES
I performed a medical screening history and physical exam on this patient. I also cared for and evaluated the patient in conjunction with the ED Advanced Practice Provider    HISTORY OF PRESENT ILLNESS  Lonnie Marx is a 64 y.o. male with history of CAD, hypertension, hyperlipidemia, rectal cance currently undergoing chemo/radiation following with heme-onc presenting with abdominal pain with nausea as well as diarrhea. Patient states over the past several days has had increasing periumbilical abdominal pain that is been severe, constant, stabbing with associated nausea without vomiting. Patient states he has had decreased p.o. intake. Patient states he is having loose stools at home. Denies fevers, chills, chest pain, shortness of breath, cough or sputum production. Denies urinary changes. Addison Martinez PHYSICAL EXAM  /62   Pulse 82   Temp 98.2 °F (36.8 °C) (Oral)   Resp 16   Ht 5' 10.5\" (1.791 m)   Wt 195 lb (88.5 kg)   SpO2 99%   BMI 27.58 kg/m²     On exam, the patient appears in no acute distress. Speech is clear. Breathing is unlabored. Moves all extremities    All diagnostic, treatment, and disposition decisions were made by myself in conjunction with the advanced practice provider. For all further details of the patient's emergency department visit, please see the advanced practice provider's documentation. Comment: Please note this report has been produced using speech recognition software and may contain errors related to that system including errors in grammar, punctuation, and spelling, as well as words and phrases that may be inappropriate. If there are any questions or concerns please feel free to contact the dictating provider for clarification.         Sohan Coffey MD  12/02/21 [id number]

## 2021-12-03 NOTE — PROGRESS NOTES
Patient presented to infusion suite with wife  After radiation, here today for IV hydration. Patient unable to complete radiation today due to L shoulder pain that has been ongoing for two weeks. Patient also c/o an upset stomach, and diarrhea x3 this AM. No fevers, chills, nausea, or vomiting.  at chairside to evaluate patient. Per Dr. Vitaly Mesa, Chest XR L shoulder and Lomotil ordered. Per Dr. Vitaly Mesa, ok to take up to 4 Norco a day to decrease pain. 1L NS, Decadron 8mg and Pepcid  20 mg given. AVS given, and patient discharged from infusion suite. Patient RTC 12/7 for an OV.

## 2021-12-07 PROBLEM — R60.0 EDEMA OF LEFT LOWER EXTREMITY: Status: ACTIVE | Noted: 2021-01-01

## 2021-12-07 NOTE — TELEPHONE ENCOUNTER
Spoke with patient regarding his Venous dopplet on 12.09.2021 at [facility] arr 1030. Patient stated understanding.

## 2021-12-07 NOTE — PROGRESS NOTES
MA Rooming Questions  Patient: Gertrude Rothman  MRN: [id number]    Date: 12/7/2021        1. Do you have any new issues? yes - pain in lower abdomen, ongoing diarrhea , no appetite        2. Do you need any refills on medications?    no    3. Have you had any imaging done since your last visit? Yes-XRAY    4. Have you been hospitalized or seen in the emergency room since your last visit here?   no    5. Did the patient have a depression screening completed today?  No    No data recorded     PHQ-9 Given to (if applicable):               PHQ-9 Score (if applicable):                     [] Positive     []  Negative              Does question #9 need addressed (if applicable)                     [] Yes    []  No               Marramsese Ends, CMA

## 2021-12-07 NOTE — PROGRESS NOTES
Patient Name:  Gustavo Lynne  Patient :  1945  Patient MRN:  [id number]     Primary Oncologist: Zofia Covarrubias MD  Referring Provider: Caesar Kat MD     Date of Service: 2021      Reason for Consult:  Rectal cancer     Chief Complaint:    Chief Complaint   Patient presents with    Follow-up       Encounter Diagnosis   Name Primary?  Rectal cancer ([address] Yes        HPI:   21: Arrived with his wife to the clinic today he reported diarrhea for the past 6 weeks. One episode of dark blood in the stool. No constipation. Reported tenesmus. Reported suprapubic pain for the past 6 weeks usually occurs at night, lasting for 5 minutes and then resolve spontaneously. Denied any  symptoms. Appetite is okay but has lost about 25 pounds in the past 6 weeks and gained about few pounds. Denied any nausea or vomiting. Denied any chest pain, increase shortness of breath, palpitations or dizziness. 2021: EGD with normal esophagus, normal GE junction. Erosions in the mucosa was noted in the antrum. Duodenum normal.  Colonoscopy with an infiltrating fungating nonbleeding mass of malignant appearance in the rectum. Mass was about 8 cm from the anal verge. Was unable to pass the scope. 21:Final Pathologic Diagnosis:   A.  Rectum, biopsy:   -  INVASIVE MODERATELY DIFFERENTIATED ADENOCARCINOMA IS   IDENTIFIED (see comment, see stains report). Ninetta Guise, antrum and body, biopsy:   -  Benign gastric mucosa containing minimal chronic   inflammation (see comment, see stains report). -  Helicobacter pylori microorganisms are not identified.       C.  Small bowel, second part duodenum, biopsy:   -  Benign duodenal mucosa containing no significant   histopathologic abnormality.    -  Normal villi are identified.       21: CT abdomen and pelvis:  Irregular mural thickening of the rectum, in keeping with the patient's known   history of rectal carcinoma.       There localized mesial rectal lymph nodes and implants, which are concerning   for localized spread of the disease.       No distant metastatic disease is seen within the abdomen pelvis. 6/10/21: CT chest:  1. Scattered noncalcified solid pulmonary nodules the largest measuring up to   9 mm in left upper lobe.  No prior exams are available for comparison. Metastatic disease cannot be excluded. 2. No other acute cardiopulmonary findings. 6/18/21: MRI pelvis:  1. Approximate 4.6 cm mass of the rectum extending 1 cm beyond the muscularis   propria, T3c.   2. The MRI circumferential resection margin is approximately 10 mm as   described above. 3. Apparent noncontiguous extramural venous invasion as well as to suspicious   mesorectal lymph nodes, N1c. Then he missed several appointments for port placement and treatment as plan was to start FOLFOX in the meanwhile    8/2/21: PET:   1. Intensely hypermetabolic rectal mass is consistent with primary   malignancy.  Several prominent mesorectal lymph nodes with mild uptake are   likely metastatic as noted on recent MRI. 2. Dominant 9 mm pulmonary nodule in the posterior left upper lobe   demonstrates moderate uptake while additional smaller nodules demonstrate no   significant uptake but are below the threshold for accurate PET evaluation. Findings are suspicious for pulmonary metastatic disease. 8/17/21:  Final Pathologic Diagnosis:   Left lung upper lobe mass, needle core biopsy:        -     NON-SMALL CELL CARCINOMA, MOST CONSISTENT WITH   LUNG ADENOCARCINOMA. CK AE1/AE3: Positive in the tumor cells. CK7: Positive in the tumor cells. TTF-1: Positive in the tumor cells. Napsin-A: Positive in the tumor cells. CK 5/6: Shows staining in rare tumor cells. P40: Negative in the tumor cells. 9/20/21: Received C1D1 FOLFOX.     SBRT to the lung  10/6, 10/8 and 10/11/2021    Received C2      11/28/21: Was admitted to the hospital with abdominal pain, diarrhea, dizziness, weakness. 11/28/21: Ct abdomen and pelvis:  1. New since the prior exam is left hemicolonic colitis. [place] is diffuse   colonic distention more significant at the proximal aspect with the proximal   transverse colon measuring approximately 8 cm in diameter.  This likely   relates to adynamic ileus versus post treatment stenosis with distal   obstruction. 2. The rectosigmoid posterior wall mass appears decreased in prominence since   the prior exam which suggests response to therapy. 3. Mild ascites.  No bowel perforation or abscess. 11/28/2021 CBC with WBC of 8.9 hemoglobin of 9.9 hematocrit of 32.3 MCV of 103 and platelets of 498 CMP with a creatinine 1.1 potassium of 3.4, albumin of 2.8 lipase 11 lactic acid 1.3 magnesium 2.1 C. difficile negative UA with no blood GI PCR panel negative    Plan was to start chemoradiation but not able to lie down for simulation sec to pain in the left shoulder    12/3/21: [facility] XR:  Moderate degenerative changes seen within the acromioclavicular and   glenohumeral joint as well as the rotator cuff insertion on the proximal   humerus. Past Medical History:     PAD, HTN, HLP, Insomnia                                                         Past Surgery History:    Bilateral carpal tunnel syn                                                                            Social History:   Lives with his wife. 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 Retired as a baker. Has 9 boys all settled in [place] to retired as a baker's as well. Reported smoking 1 pack/day for 20 years both 35 years ago. Used to consume a lot of beer about 35 years ago. No other illicit drug abuse. Family History:    Brother was diagnosed with polyps which were removed he is not sure whether he was diagnosed with cancer. No Known Allergies    Current Outpatient Medications on File Prior to Visit   Medication Sig Dispense Refill    atenolol (TENORMIN) 25 MG tablet       diphenoxylate-atropine (DIPHENATOL) 2.5-0.025 MG per tablet Take 1 tablet by mouth 4 times daily as needed for Diarrhea for up to 15 days. 60 tablet 1    HYDROcodone-acetaminophen (NORCO) 5-325 MG per tablet Take 1 tablet by mouth every 8 hours as needed for Pain for up to 15 days. Intended supply: 3 days. Take lowest dose possible to manage pain 45 tablet 0    capecitabine (XELODA) 500 MG chemo tablet Take 3 tablets (1,500 mg total) by mouth 2 times daily for 25 days 150 tablet 0    ciprofloxacin (CIPRO) 250 MG tablet Take 2 tablets by mouth 2 times daily for 10 days 40 tablet 0    metroNIDAZOLE (FLAGYL) 500 MG tablet Take 1 tablet by mouth 3 times daily for 10 days 30 tablet 0    ondansetron (ZOFRAN) 8 MG tablet Take 1 tablet by mouth every 8 hours as needed for Nausea or Vomiting 30 tablet 1    dronabinol (MARINOL) 2.5 MG capsule Take 1 capsule by mouth 2 times daily (before meals) for 30 days. 60 capsule 0    ciprofloxacin (CIPRO) 500 MG tablet Take 1 tablet by mouth 2 times daily for 10 days 20 tablet 0    metroNIDAZOLE (FLAGYL) 500 MG tablet Take 1 tablet by mouth 3 times daily for 10 days 30 tablet 0    cholestyramine (QUESTRAN) 4 g packet Take 1 packet by mouth 2 times daily 90 packet 3    loperamide (RA ANTI-DIARRHEAL) 2 MG capsule Take 2 tablets with the first loose stool and then 1 tablet with each loose stool until stool starts to form. Do not take more than 8 tablets in 1 day.  60 capsule 1    cephALEXin (KEFLEX) 500 MG capsule Take 1 capsule by mouth 3 times daily 30 capsule 0    OLANZapine (ZYPREXA) 5 MG tablet One tablet po HS for four nights starting the night of chemotherapy 24 tablet 0    dexamethasone (DECADRON) 4 MG tablet 1 tablet PO daily for 4 days starting the day after chemotherapy every two weeks. 16 tablet 3    clorazepate (TRANXENE) 7.5 MG tablet TAKE 1 TABLET BY MOUTH ONCE DAILY      Lactobacillus (ACIDOPHILUS PROBIOTIC FORMULA) TABS TAKE 1 TABLET BY MOUTH ONCE DAILY      traZODone (DESYREL) 100 MG tablet       simvastatin (ZOCOR) 40 MG tablet Take 40 mg by mouth nightly.  gabapentin (NEURONTIN) 300 MG capsule Take 300 mg by mouth nightly.  aspirin 81 MG chewable tablet Take 81 mg by mouth daily.  cilostazol (PLETAL) 50 MG tablet Take 1 tablet by mouth 2 times daily. 60 tablet 3     No current facility-administered medications on file prior to visit. Interval history:12/7/21: Arrived with his wife to the clinic today. Reported that he continues to have lower abdominal pain and also watery diarrhea. Bottom is very sore. Feels very weak and tired. No  sx. No fever. No bleeding. Continues to have pain in the right shoulder. Reported that his LLE is swollen. Review of Systems:  As per interval history, otherwise ros neg.       Vital Signs: BP (!) 124/57 (Site: Left Upper Arm, Position: Sitting, Cuff Size: Medium Adult)   Pulse 89   Temp 96.6 °F (35.9 °C) (Temporal)   Resp 18   Ht 5' 9\" (1.753 m)   Wt 185 lb (83.9 kg)   BMI 27.32 kg/m²      CONSTITUTIONAL: awake, alert, poor hygiene,  tired appearing, thinly built  EYES: PEBBLES, Pallor+  ENT: ATNC  NECK: No JVD  HEMATOLOGIC/LYMPHATIC: no cervical, supraclavicular or axillary lymphadenopathy   LUNGS: CTAB  CARDIOVASCULAR: s1s2 rrr no murmurs  ABDOMEN: soft nondistended but mild tenderness to palpation in the lower abdominal area bs pos  MUSCULOSKELETAL: full range of motion noted, tone is normal  NEUROLOGIC: GI  SKIN:rash  EXTREMITIES: no LE edema bilaterally     Labs:  Hematology:  Lab Results   Component Value Date    WBC 9.2 11/29/2021    RBC 3.25 (L) 11/29/2021    HGB 10.5 (L) 11/29/2021    HCT 33.6 (L) 11/29/2021    .4 (H) 11/29/2021    MCH 32.3 (H) 11/29/2021    MCHC 31.3 (L) 11/29/2021    RDW 15.9 (H) 11/29/2021     11/29/2021    MPV 9.9 11/29/2021    BANDSPCT 23 (H) 11/29/2021    SEGSPCT 47.0 11/29/2021    EOSRELPCT 4.4 (H) 11/12/2021    BASOPCT 0.9 11/12/2021    LYMPHOPCT 16.0 (L) 11/29/2021    MONOPCT 13.0 (H) 11/29/2021    BANDABS 2.12 11/29/2021    SEGSABS 4.3 11/29/2021    EOSABS 0.1 11/12/2021    BASOSABS 0.0 11/12/2021    LYMPHSABS 1.5 11/29/2021    MONOSABS 1.2 11/29/2021    DIFFTYPE MANUAL DIFFERENTIAL 11/29/2021    ANISOCYTOSIS 1+ 11/29/2021    POLYCHROM 1+ 11/29/2021     No results found for: ESR  Chemistry:  Lab Results   Component Value Date     11/29/2021    K 3.5 11/29/2021     11/29/2021    CO2 25 11/29/2021    BUN 33 (H) 11/29/2021    CREATININE 0.9 11/29/2021    GLUCOSE 161 (H) 11/29/2021    CALCIUM 8.7 11/29/2021    PROT 5.2 (L) 11/28/2021    LABALBU 2.8 (L) 11/28/2021    BILITOT 0.5 11/28/2021    ALKPHOS 76 11/28/2021    AST 16 11/28/2021    ALT 7 (L) 11/28/2021    LABGLOM >60 11/29/2021    GFRAA >60 11/29/2021    MG 2.0 11/29/2021    POCCA 1.18 09/17/2021    POCGLU 83 09/17/2021     No results found for: MMA, LDH, HOMOCYSTEINE  No components found for: LD  Lab Results   Component Value Date    TSHHS 3.444 06/17/2011     Immunology:  Lab Results   Component Value Date    PROT 5.2 (L) 11/28/2021     No results found for: NARAYAN Fontana  No results found for: B2M  Coagulation Panel:  Lab Results   Component Value Date    PROTIME 13.8 08/10/2021    INR 1.07 08/10/2021    APTT 31.2 08/10/2021     Anemia Panel:  Lab Results   Component Value Date    LITOAODD44 >2000 (H) 09/17/2021    FOLATE 4.2 09/17/2021     Tumor Markers:  Lab Results   Component Value Date    CEA 3.5 07/21/2021    PSA 0.56 06/17/2011        Observations:  ECOG:  No data recorded       Assessment & Plan:                                                          Rectal cancer: Moderately Differentiated adenocarcinoma.   8 cm from the anal verge.  MRI abdomen as above. CT chest with small pulmonary nodules largest being 9 mm. No prior CT scans to compare. The plan was to obtain PET scan, consult with surgery(he did not wish to be referred to [facility], [facility]), consult with radiation oncology for further treatment plan and in the meantime start chemotherapy with FOLFOX. July 2021 CEA 3.5  But he was very complaint with appointments and did finally get the PET on 8/2/21 and biopsy on 8/17 was done. S/p  SBRT lung nodule and Recommend complete neoadjuvant chemotherapy followed by chemoradiation followed by surgery,   He has  not been able to tolerate chemotherapy with adverse side effects and also worsening diarrhea. Ct abdomen afetr 2C with decreasing rectal mass. Recommended proceeding with chemoradiation with Xeloda. Discussed adverse effects. OCM requested. Not able to tolerate lying for simulation secondary to shoulder pain. Shoulder XR with arthritis and ? RCT  Adequate analgesic regimen and if pain under control, then may retry simulation otherwise will have to resume chemotherapy vs surgery    Possible colitis on last CT scan. Ciprofloxacin and metronidazole prescribed. Recommend probiotics. Recommended lomotil as needed as C. difficile negative    Very poor nutrition, low albumin. Improve nutritional status and also prescribe Marinol. Discussed adverse effects including but not limited to thromboembolism. Anemia: Most probably sec to underlying malignancy, poor nutrition, chemotherapy. Consider RAKESH. Diarrhea, nausea, emesis. As mentioned above antidiarrheal agents as needed and also adequate antiemetic regimen. Dizziness, orthostatic hypotension. Recommend IV fluids and dexamethasone and antiemetics today and also on Friday. NADIA adeno ca: S/P SRS. Is followed by Dr Lucio Rosales    Rectal pain/loer abdominal pain: Adequate analgesic regimen and Adequate bowel regimen. Discussed the treatment options. LLE edema. R/O DVT. Continue other medical care. Discussed above findings and plan with him and he voiced understanding. Answered all his questions. Discussed healthy lifestyle including healthy diet, regular exercise as tolerated. Also discussed importance of being up-to-date with age-appropriate screening tools. Recommend follow-up with primary care physician and other specialists. Please do not hesitate to contact us if you need further information. Return to clinic 12/21/21 or earlier if new Sx    I have recommended that the patient follow CDC guidelines for prevention of COVID-19 infection. He says he has received covid vaccine. Received FLU vaccine. Has not received Booster yet.      [facility]

## 2021-12-13 PROBLEM — K63.1 PERFORATED BOWEL (HCC): Status: ACTIVE | Noted: 2021-01-01

## 2021-12-13 NOTE — ANESTHESIA PROCEDURE NOTES
Central Venous Line:    A central venous line was placed using surface landmarks, in the OR for the following indication(s): central venous access. 12/13/2021 9:50 AM12/13/2021 9:55 AM    Sterility preparation included the following: hand hygiene performed prior to procedure, maximum sterile barriers used and sterile technique used to drape from head to toe. The patient was placed in Trendelenburg position. The left subclavian vein was prepped. The site was prepped with Chloraprep. A 7 Fr (size), 20 (length), introducer triple lumen was placed. During the procedure, the following specific steps were taken: target vein identified, needle advanced into vein and blood aspirated and guidewire advanced into vein. Post insertion care included: all ports aspirated, all ports flushed easily, guidewire removed intact, Biopatch applied, line sutured in place, dressing applied and Placed at 19cm. During the procedure the patient experienced: patient tolerated procedure well with no complications, EBL 0mL and EBL < 5mL. Insertion site scrubbed per usage guidelines?: Yes  Skin prep agent dried for 3 minutes prior to procedure?:yes  Anesthesia type: general.. No    Additional notes:   White port did not draw but flushed initially at 20 cm while other 2 ports draw and flush,, retraced to 19cm, draws and flushes all 3 ports  Staffing  Performed: Anesthesiologist   Anesthesiologist: Nitish Duval MD  Preanesthetic Checklist  Completed: patient identified, IV checked, site marked, risks and benefits discussed, surgical consent, monitors and equipment checked, pre-op evaluation, timeout performed, anesthesia consent given, oxygen available and patient being monitored

## 2021-12-13 NOTE — ED PROVIDER NOTES
I independently examined and evaluated Hailee Quevedo. In brief their history revealed  a 64 y.o. male who presents to the emergency department for abdominal pain and constipation. Patient states he has not had a bowel movement in 3 days. He is not passing gas. Has not taken anything to alleviate his constipation at home. Abdominal pain is diffuse, associated distention. Denies n/v. Denies fever, chills, cp, sob. Patient with a history of rectal cancer. States he typically has loose stools and will take an antidiarrheal for that. Their focused exam revealed alert male, ill-appearing no distress normocephalic atraumatic sclera clear lungs clear heart tachycardic regular rhythm 2+ pulses throughout abdomen soft distended tender slight guarding noted diffusely. No rebound no rigidity. 5-5 strength throughout skin has no rash some slight pitting edema to both lower extremities. Is pale. Cranial nerves intact. ED course: Patient seen with PA please see her note. Patient here with worsening abdominal pain that started today. History of rectal cancer he does have a port. Upon arrival to my shift PA did perform work-up he was found to be hyperkalemic and CT scan resulted upon my arrival with a large bowel perforation large free air. I did consult his general surgeon who will call the OR. I did order rapid Covid, pressors, fluids, oxygen, antibiotics. Patient stable but is ill. Will need urgent operation general surgery on their way in. General surgery saw patient in ER will take patient to operating room will consult hospital medicine for admission rapid Covid test is pending. Total critical care time today provided was at least 30 minutes. This excludes seperately billable procedure.  Critical care time provided for reviewing labs, images, consulting surgery giving antibiotics giving fluids giving oxygen giving pressors that required close evaluation and/or intervention with concern for patient decompensation. All diagnostic, treatment, and disposition decisions were made by myself in conjunction with the Advanced Practice Provider. For all further details of the patient's emergency department visit, please see the Advanced Practice Provider's documentation.         Gurjit Mckeon DO  12/13/21 [id number]

## 2021-12-13 NOTE — PROGRESS NOTES
Bp's remain soft, continue to titrate Ron.  HR -[id number]    [id number]- requested albumin order for bp from hospitalist

## 2021-12-13 NOTE — CONSULTS
Department of GeneralSurgery   Surgical Service Dr Sorensen Captkaushik   Consult Note    Date of Consult: 12/13/21    Critical care consult time: 45 min    Reason for Consult:  Perforated bowel  Requesting Physician:  Dr Carroll Talley:  abd pain    History Obtained From:  patient    HISTORY OF PRESENT ILLNESS:                The patient is a 64 y.o. male who presents with worsening abd pain and distension over the past three days. Pain is described as cramping, pressure-like, shooting and stabbing. Pain level is 10/10. Pt believes he has been constipated and has not had BM x 3 days. He is known to me for metastatic rectal cancer. He has been been on neoadjuvant chemo/radiation. Last treatment was last wk. Denies fever chills. CT scan was done in ED and shows       Impression   1. Large amount of free intraperitoneal air is noted, consistent with   perforated bowel.  Surgical consultation is recommended. 2. There is a focal area of narrowing within the rectosigmoid region which   results in large bowel obstruction.  This is likely related to patient's   known rectosigmoid neoplasm. 3. Minimal fluid is seen within the abdomen and pelvis. 4. Small bilateral pleural effusions with adjacent atelectasis. 5. Cholelithiasis. 6. 1.5 x 1.6 cm hypodense lesion within the right hepatic lobe.  Findings are   concerning for a metastatic lesion.  This can be further evaluated with MRI   of the liver. 7. Avascular necrosis of the right femoral head without evidence of   subchondral collapse.    Findings were discussed with Michelle Snyder at 6:01 a.m. on 12/13/2021.       RECOMMENDATIONS:   Unavailable                   Past Medical History:    Past Medical History:   Diagnosis Date    Cancer [facility])     rectal cancer    Hyperlipidemia     Hypertension        Past Surgical History:    Past Surgical History:   Procedure Laterality Date    CT NEEDLE BIOPSY LUNG PERCUTANEOUS  8/17/2021    CT NEEDLE BIOPSY LUNG PERCUTANEOUS 2021 [facility] CT SCAN    HAND SURGERY      IR PORT PLACEMENT EQUAL OR GREATER THAN 5 YEARS  8/10/2021    IR PORT PLACEMENT EQUAL OR GREATER THAN 5 YEARS 8/10/2021 [facility] SPECIAL PROCEDURES       Current Medications:   Current Facility-Administered Medications   Medication Dose Route Frequency Provider Last Rate Last Admin    morphine (PF) injection 4 mg  4 mg IntraVENous Q30 Min PRN Sanjana CHARU Gipson   4 mg at 21 0427    norepinephrine (LEVOPHED) 16 mg in sodium chloride 0.9 % 250 mL infusion  2-100 mcg/min IntraVENous Continuous Ana Sow, DO           Allergies:  Patient has no known allergies. Social History:   Social History     Socioeconomic History    Marital status:      Spouse name: None    Number of children: None    Years of education: None    Highest education level: None   Occupational History    None   Tobacco Use    Smoking status: Former Smoker     Packs/day: 0.25     Years: 6.00     Pack years: 1.50     Start date: 1981     Quit date:      Years since quittin.9    Smokeless tobacco: Never Used   Substance and Sexual Activity    Alcohol use: No    Drug use: No    Sexual activity: None   Other Topics Concern    None   Social History Narrative    None     Social Determinants of Health     Financial Resource Strain:     Difficulty of Paying Living Expenses: Not on file   Food Insecurity:     Worried About Running Out of Food in the Last Year: Not on file    Dmitry of Food in the Last Year: Not on file   Transportation Needs:     Lack of Transportation (Medical): Not on file    Lack of Transportation (Non-Medical):  Not on file   Physical Activity:     Days of Exercise per Week: Not on file    Minutes of Exercise per Session: Not on file   Stress:     Feeling of Stress : Not on file   Social Connections:     Frequency of Communication with Friends and Family: Not on file    Frequency of Social Gatherings with Friends and Family: Not on file    Attends [employer] Services: Not on file    Active Member of Clubs or Organizations: Not on file    Attends Club or Organization Meetings: Not on file    Marital Status: Not on file   Intimate Partner Violence:     Fear of Current or Ex-Partner: Not on file    Emotionally Abused: Not on file    Physically Abused: Not on file    Sexually Abused: Not on file   Housing Stability:     Unable to Pay for Housing in the Last Year: Not on file    Number of [place] in the Last Year: Not on file    Unstable Housing in the Last Year: Not on file       Family History:   Family History   Problem Relation Age of Onset    High Blood Pressure Father     Heart Disease Father     Stroke Sister        REVIEW OFSYSTEMS:    Review of Systems   Constitutional: Negative for chills and fever. HENT: Negative for ear pain, mouth sores, sore throat and tinnitus. Eyes: Negative for photophobia, redness and itching. Respiratory: Negative for apnea, choking and stridor. Cardiovascular: Negative for chest pain and palpitations. Gastrointestinal: Positive for abdominal pain and constipation. Negative for anal bleeding and rectal pain. Endocrine: Negative for polydipsia. Genitourinary: Negative for enuresis, flank pain and hematuria. Musculoskeletal: Negative for back pain, joint swelling and myalgias. Skin: Negative for color change and pallor. Allergic/Immunologic: Negative for environmental allergies. Neurological: Negative for syncope and speech difficulty. Psychiatric/Behavioral: Negative for confusion and hallucinations. PHYSICAL EXAM:  Vitals:    12/13/21 0645 12/13/21 0700 12/13/21 0753 12/13/21 0809   BP: (!) 95/48 (!) 99/54 (!) 106/50 (!) 93/51   Pulse: 94 95 93    Resp: 16  16    Temp:   97.4 °F (36.3 °C)    TempSrc:   Tympanic    SpO2: 99% 100% 98%        Physical Exam  Constitutional:       Appearance: He is well-developed. HENT:      Head: Normocephalic.    Eyes: Pupils: Pupils are equal, round, and reactive to light. Cardiovascular:      Rate and Rhythm: Normal rate. Pulmonary:      Effort: Pulmonary effort is normal.   Abdominal:      General: There is distension. Palpations: Abdomen is soft. There is no mass. Tenderness: There is abdominal tenderness. There is guarding and rebound. Musculoskeletal:         General: Normal range of motion. Cervical back: Normal range of motion and neck supple. Skin:     General: Skin is warm. Neurological:      Mental Status: He is alert and oriented to person, place, and time.            DATA:    CBC with Differential:    Lab Results   Component Value Date    WBC 2.3 12/13/2021    RBC 3.71 12/13/2021    HGB 12.0 12/13/2021    HCT 39.7 12/13/2021     12/13/2021    .0 12/13/2021    MCH 32.3 12/13/2021    MCHC 30.2 12/13/2021    RDW 16.6 12/13/2021    SEGSPCT 55.0 12/13/2021    BANDSPCT 10 12/13/2021    LYMPHOPCT 30.0 12/13/2021    MONOPCT 5.0 12/13/2021    EOSPCT 4.6 06/17/2011    BASOPCT 0.9 11/12/2021    MONOSABS 0.1 12/13/2021    LYMPHSABS 0.7 12/13/2021    EOSABS 0.1 11/12/2021    BASOSABS 0.0 11/12/2021    DIFFTYPE MANUAL DIFFERENTIAL 12/13/2021     CMP:    Lab Results   Component Value Date     12/13/2021    K 2.8 12/13/2021     12/13/2021    CO2 25 12/13/2021    BUN 26 12/13/2021    CREATININE 1.1 12/13/2021    GFRAA >60 12/13/2021    LABGLOM >60 12/13/2021    GLUCOSE 106 12/13/2021    PROT 5.5 12/13/2021    PROT 6.7 06/17/2011    LABALBU 3.1 12/13/2021    CALCIUM 8.6 12/13/2021    BILITOT 0.8 12/13/2021    ALKPHOS 95 12/13/2021    AST 15 12/13/2021    ALT 7 12/13/2021       IMPRESSION:        Patient Active Problem List:     Rectal cancer ([facility] [address]     Lung nodule     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

## 2021-12-13 NOTE — PROGRESS NOTES
Pt now having episodes of couplets, sylvia, trigemeny  hospitalist made aware.   Potassium 2.8 this am and Mag 1.9  Prn protocol started

## 2021-12-13 NOTE — ANESTHESIA POSTPROCEDURE EVALUATION
Department of Anesthesiology  Postprocedure Note    Patient: Heydi Marie  MRN: [id number]  YOB: 1945  Date of evaluation: 12/13/2021  Time:  3:19 PM     Procedure Summary     Date: 12/13/21 Room / Location: [address]    Anesthesia Start: 1826 Anesthesia Stop: 9572    Procedure: LAPAROTOMY EXPLORATORY, RIGHT HEMICOLECTOMY, LOOP ILEOSTOMY PLACEMENT (N/A Abdomen) Diagnosis: (FREE AIR ABDOMAN)    Surgeons: Devi Price MD Responsible Provider: Natalia Eduardo MD    Anesthesia Type: general ASA Status: 4 - Emergent          Anesthesia Type: general    Dimitri Phase I: Dimitri Score: 8    Dimitri Phase II:      Last vitals: Reviewed and per EMR flowsheets.        Anesthesia Post Evaluation    Patient location during evaluation: PACU  Patient participation: complete - patient participated  Level of consciousness: awake  Pain score: 1  Airway patency: patent  Nausea & Vomiting: no nausea and no vomiting  Complications: no  Cardiovascular status: hemodynamically stable and vasoactive/inotropes  Respiratory status: acceptable  Hydration status: euvolemic  Comments: Exchanged LSC cvc in pacu

## 2021-12-13 NOTE — ANESTHESIA PROCEDURE NOTES
Arterial Line:    An arterial line was placed using ultrasound guidance, in the OR for the following indication(s): continuous blood pressure monitoring and blood sampling needed. A 20 gauge (size), 1 and 1/4 inch (length), Angiocath (type) catheter was placed, Seldinger technique used, into the right radial artery, secured by tape and Tegaderm. Anesthesia type: Local  Local infiltration: Injection    Events:  patient tolerated procedure well with no complications, EBL 0mL and EBL < 5mL.     Additional notes:  No complicaitons  73/03/0673 9:30 AM12/13/2021 9:32 AM  Anesthesiologist: Jayla Basilio MD  Resident/CRNA: RYAN Whitaker - CRNA  Performed: Resident/CRNA   Preanesthetic Checklist  Completed: patient identified, IV checked, site marked, risks and benefits discussed, surgical consent, monitors and equipment checked, pre-op evaluation, timeout performed, anesthesia consent given, oxygen available and patient being monitored

## 2021-12-13 NOTE — PROGRESS NOTES
Pt arrived to floor from PACU Levo and Ron running along with LR.   On room air, c/o 10/10 pain, toradol ordered d/t low bp

## 2021-12-13 NOTE — PROGRESS NOTES
1303-  at bedside to manipulate CVC placement. 1330-  at bedside to review CXR, central line in good placement and ok to use. Report called to [facility], pt to room 2108 with [facility] RN and [facility] PCT with telemetry.

## 2021-12-13 NOTE — ED PROVIDER NOTES
Emergency [address] EMERGENCY DEPARTMENT    Patient: Hardy Cherry  MRN: [id number]  : 1945  Date of Evaluation: 2021  ED Provider: Zak Horner PA-C    Chief Complaint       Chief Complaint   Patient presents with    Abdominal Pain    Constipation       Bess Joseph is a 64 y.o. male who presents to the emergency department for abdominal pain and constipation. Patient states he has not had a bowel movement in 3 days. He is not passing gas. Has not taken anything to alleviate his constipation at home. Abdominal pain is diffuse, associated distention. Severity 10/10. Denies n/v. Denies fever, chills, cp, sob. Patient with a history of rectal cancer. States he typically has loose stools and will take an antidiarrheal for that. ROS     CONSTITUTIONAL:  Denies fever. EYES:  Denies visual changes. HEAD:  Denies headache. ENT:  Denies earache, nasal congestion, sore throat. NECK:  Denies neck pain. RESPIRATORY:  Denies any shortness of breath. CARDIOVASCULAR:  Denies chest pain. GI:  Denies nausea or vomiting.  + abd pain, constipation. :  Denies urinary symptoms. MUSCULOSKELETAL:  Denies extremity pain or swelling. BACK:  Denies back pain. INTEGUMENT:  Denies skin changes. LYMPHATIC:  Denies lymphadenopathy. NEUROLOGIC:  Denies any numbness/tingling.     Past History     Past Medical History:   Diagnosis Date    Cancer [facility])     rectal cancer    Hyperlipidemia     Hypertension      Past Surgical History:   Procedure Laterality Date    CT NEEDLE BIOPSY LUNG PERCUTANEOUS  2021    CT NEEDLE BIOPSY LUNG PERCUTANEOUS 2021 [facility] CT SCAN    HAND SURGERY      IR PORT PLACEMENT EQUAL OR GREATER THAN 5 YEARS  8/10/2021    IR PORT PLACEMENT EQUAL OR GREATER THAN 5 YEARS 8/10/2021 [facility] SPECIAL PROCEDURES     Social History     Socioeconomic History    Marital status:      Spouse name: Not on file    Number of children: Not on file    Years of education: Not on file    Highest education level: Not on file   Occupational History    Not on file   Tobacco Use    Smoking status: Former Smoker     Packs/day: 0.25     Years: 6.00     Pack years: 1.50     Start date: 1981     Quit date:      Years since quittin.9    Smokeless tobacco: Never Used   Substance and Sexual Activity    Alcohol use: No    Drug use: No    Sexual activity: Not on file   Other Topics Concern    Not on file   Social History Narrative    Not on file     Social Determinants of Health     Financial Resource Strain:     Difficulty of Paying Living Expenses: Not on file   Food Insecurity:     Worried About [address] in the Last Year: Not on file    [address] [employer] in the Last Year: Not on file   Transportation Needs:     Lack of Transportation (Medical): Not on file    Lack of Transportation (Non-Medical): Not on file   Physical Activity:     Days of Exercise per Week: Not on file    Minutes of Exercise per Session: Not on file   Stress:     Feeling of Stress : Not on file   Social Connections:     Frequency of Communication with Friends and Family: Not on file    Frequency of Social Gatherings with Friends and Family: Not on file    Attends Congregation Services: Not on file    Active Member of [address] [place] [address] or Organizations: Not on file    Attends Club or Organization Meetings: Not on file    Marital Status: Not on file   Intimate Partner Violence:     Fear of Current or Ex-Partner: Not on file    Emotionally Abused: Not on file    Physically Abused: Not on file    Sexually Abused: Not on file   Housing Stability:     Unable to Pay for Housing in the Last Year: Not on file    Number of [place] in the Last Year: Not on file    Unstable Housing in the Last Year: Not on file       Medications/Allergies     Previous Medications    ASPIRIN 81 MG CHEWABLE TABLET    Take 81 mg by mouth daily. ATENOLOL (TENORMIN) 25 MG TABLET        CAPECITABINE (XELODA) 500 MG CHEMO TABLET    Take 3 tablets (1,500 mg total) by mouth 2 times daily for 25 days    CEPHALEXIN (KEFLEX) 500 MG CAPSULE    Take 1 capsule by mouth 3 times daily    CHOLESTYRAMINE (QUESTRAN) 4 G PACKET    Take 1 packet by mouth 2 times daily    CILOSTAZOL (PLETAL) 50 MG TABLET    Take 1 tablet by mouth 2 times daily. CLORAZEPATE (TRANXENE) 7.5 MG TABLET    TAKE 1 TABLET BY MOUTH ONCE DAILY    DEXAMETHASONE (DECADRON) 4 MG TABLET    1 tablet PO daily for 4 days starting the day after chemotherapy every two weeks. DIPHENOXYLATE-ATROPINE (DIPHENATOL) 2.5-0.025 MG PER TABLET    Take 1 tablet by mouth 4 times daily as needed for Diarrhea for up to 15 days. DRONABINOL (MARINOL) 2.5 MG CAPSULE    Take 1 capsule by mouth 2 times daily (before meals) for 30 days. GABAPENTIN (NEURONTIN) 300 MG CAPSULE    Take 300 mg by mouth nightly. HYDROCODONE-ACETAMINOPHEN (NORCO) 5-325 MG PER TABLET    Take 1 tablet by mouth every 8 hours as needed for Pain for up to 15 days. Intended supply: 3 days. Take lowest dose possible to manage pain    LACTOBACILLUS (ACIDOPHILUS PROBIOTIC FORMULA) TABS    TAKE 1 TABLET BY MOUTH ONCE DAILY    LOPERAMIDE (RA ANTI-DIARRHEAL) 2 MG CAPSULE    Take 2 tablets with the first loose stool and then 1 tablet with each loose stool until stool starts to form. Do not take more than 8 tablets in 1 day. OLANZAPINE (ZYPREXA) 5 MG TABLET    One tablet po HS for four nights starting the night of chemotherapy    ONDANSETRON (ZOFRAN) 8 MG TABLET    Take 1 tablet by mouth every 8 hours as needed for Nausea or Vomiting    SIMVASTATIN (ZOCOR) 40 MG TABLET    Take 40 mg by mouth nightly.     TRAZODONE (DESYREL) 100 MG TABLET         No Known Allergies     Physical Exam       ED Triage Vitals   BP Temp Temp src Pulse Resp SpO2 Height Weight   -- -- -- -- -- -- -- --     GENERAL APPEARANCE:  Well-developed, well-nourished, no acute distress. HEAD:  NC/AT. EYES:  Sclera anicteric. ENT:  Ears, nose, mouth normal.     NECK:  Supple. CARDIO:  RRR. LUNGS:   CTAB. Respirations unlabored. ABDOMEN:  Distended, taut abdomen, diffuse mild tenderness but no rebound or guarding. Hypoactive bowel sounds. EXTREMITIES:  No acute deformities. SKIN:  Pale. NEUROLOGICAL:  Alert and oriented. PSYCHIATRIC:  Normal mood. Diagnostics     Labs:  Labs Reviewed   CBC WITH AUTO DIFFERENTIAL - Abnormal; Notable for the following components:       Result Value    WBC 2.3 (*)     RBC 3.71 (*)     Hemoglobin 12.0 (*)     Hematocrit 39.7 (*)     .0 (*)     MCH 32.3 (*)     MCHC 30.2 (*)     RDW 16.6 (*)     Monocytes % 5.0 (*)     All other components within normal limits   COMPREHENSIVE METABOLIC PANEL W/ REFLEX TO MG FOR LOW K - Abnormal; Notable for the following components:    Potassium 2.8 (*)     BUN 26 (*)     Glucose 106 (*)     Albumin 3.1 (*)     Total Protein 5.5 (*)     ALT 7 (*)     All other components within normal limits   LACTIC ACID, PLASMA - Abnormal; Notable for the following components:    Lactate 4.1 (*)     All other components within normal limits   COVID-19, RAPID   CULTURE, SURGICAL   CULTURE, SURGICAL   LIPASE   MAGNESIUM   SURGICAL PATHOLOGY   CYTOLOGY, NON-GYN   COMPREHENSIVE METABOLIC PANEL W/ REFLEX TO MG FOR LOW K   LACTIC ACID, PLASMA   CALCIUM, IONIZED   MAGNESIUM   PHOSPHORUS   CBC WITH AUTO DIFFERENTIAL   TYPE AND SCREEN     Radiographs:  CT ABDOMEN PELVIS WO CONTRAST Additional Contrast? None    Result Date: 11/28/2021  EXAMINATION: CT OF THE ABDOMEN AND PELVIS WITHOUT CONTRAST 11/28/2021 12:03 pm TECHNIQUE: CT of the abdomen and pelvis was performed without the administration of intravenous contrast. Multiplanar reformatted images are provided for review.  Dose modulation, iterative reconstruction, and/or weight based adjustment of the mA/kV was utilized to reduce the radiation dose to as low as reasonably achievable. COMPARISON: CT abdomen/pelvis 05/25/2021. PET/CT 08/02/2021. HISTORY: ORDERING SYSTEM PROVIDED HISTORY: lower abdominal pain, history of rectal cancer TECHNOLOGIST PROVIDED HISTORY: Reason for exam:->lower abdominal pain, history of rectal cancer Additional Contrast?->None Decision Support Exception - unselect if not a suspected or confirmed emergency medical condition->Emergency Medical Condition (MA) Reason for Exam: lower abdominal pain, history of rectal cancer Acuity: Acute Type of Exam: Initial FINDINGS: Thorax base:  Normal heart size with no significant pericardial effusion. Coronary and aortic atherosclerotic calcifications. Moderate aortic valve calcifications. Evidence of remote healed granulomatous disease. New left hemidiaphragm elevation. Pulmonary hyperinflation with scattered calcified granulomas. Mild dependent atelectasis. No pleural effusion. Abdomen:  Extensive abdominal aortic atherosclerosis with no aneurysm formation. No intrahepatic mass or biliary dilatation. Redemonstration of numerous calcified gallstones. No wall thickening. Stable normal common bile duct. The pancreas, adrenals, kidneys, ureters, and spleen demonstrate no acute abnormality. The stomach and small bowel are normal.  The appendix demonstrates mild fluid opacification without wall thickening or adjacent focal inflammation. There is no significant cecal distention. The ascending and transverse colon demonstrate moderate fecal retention and gaseous distention with no significant wall thickening. The proximal transverse colon measures 7.9 cm in diameter. From the splenic flexure through the rectum there is mild circumferential wall thickening with pericolonic fat induration and vascular prominence. The left hemicolon demonstrates mild gaseous distention and moderate fecal retention. No evidence of pneumatosis. Trace ascites.   Left peritoneal and retroperitoneal edema with no loculated fluid Additional Contrast?->None Reason for exam:->abd pain, constipation x 3 days, hx rectal cancer Decision Support Exception - unselect if not a suspected or confirmed emergency medical condition->Emergency Medical Condition (MA) Reason for Exam: abd pain, constipation x 3 days, hx rectal cancer FINDINGS: Lower Chest: Small bilateral pleural effusions are noted. Bilateral lower lobe atelectasis is present. Organs: Cholelithiasis is present. Gas is noted along the biliary system. There is a focal indeterminate hypodense lesion within the right hepatic lobe which measures 1.5 x 1.6 cm. This is new since the prior examination. The pancreas, adrenal glands, spleen, and kidneys are unremarkable. GI/Bowel: Distended loops of colon are noted. There is a focal area of narrowing in the rectosigmoid region which could be related to a mass or stricture. This likely results in large bowel obstruction. There is a large amount of free intraperitoneal air, consistent with perforation of bowel. Pelvis: The bladder is unremarkable. There is a small amount of fluid within the pelvis. Peritoneum/Retroperitoneum: Large amount of free intraperitoneal air is noted. Minimal fluid is seen within the abdomen and pelvis. Bones/Soft Tissues: No destructive osseous lesions are identified. Avascular necrosis of the right femoral head is noted without evidence of subchondral collapse. No evidence of osseous metastatic disease. 1. Large amount of free intraperitoneal air is noted, consistent with perforated bowel. Surgical consultation is recommended. 2. There is a focal area of narrowing within the rectosigmoid region which results in large bowel obstruction. This is likely related to patient's known rectosigmoid neoplasm. 3. Minimal fluid is seen within the abdomen and pelvis. 4. Small bilateral pleural effusions with adjacent atelectasis. 5. Cholelithiasis. 6. 1.5 x 1.6 cm hypodense lesion within the right hepatic lobe.   Findings are concerning for a metastatic lesion. This can be further evaluated with MRI of the liver. 7. Avascular necrosis of the right femoral head without evidence of subchondral collapse. Findings were discussed with LISA SAINZ at 6:01 a.m. on 12/13/2021. RECOMMENDATIONS: Unavailable     XR SHOULDER LEFT (MIN 2 VIEWS)    Result Date: 12/3/2021  EXAMINATION: THREE XRAY VIEWS OF THE LEFT SHOULDER 12/3/2021 1:29 pm COMPARISON: None. HISTORY: ORDERING SYSTEM PROVIDED HISTORY: pain left shoulder TECHNOLOGIST PROVIDED HISTORY: Reason for exam:->pain left shoulder Reason for Exam: pain left shoulder Acuity: Acute Type of Exam: Initial FINDINGS: Moderate degenerative change identified within the acromioclavicular and glenohumeral joint. Undersurface spurring noted along the inferior aspect of the acromion. The scapula appears intact. No dislocation is identified. The left chest wall appears intact. No osseous erosive changes are identified. Degenerative change seen at the rotator cuff insertion as well. Moderate degenerative changes seen within the acromioclavicular and glenohumeral joint as well as the rotator cuff insertion on the proximal humerus. XR CHEST PORTABLE    Result Date: 12/13/2021  EXAMINATION: ONE XRAY VIEW OF THE CHEST 12/13/2021 12:34 pm COMPARISON: Chest radiograph 11/28/2021 HISTORY: ORDERING SYSTEM PROVIDED HISTORY: CVC placement TECHNOLOGIST PROVIDED HISTORY: Reason for exam:->CVC placement Reason for Exam: CVC placement FINDINGS: Cardiomediastinal silhouette is unchanged. Trace bilateral pleural effusions with associated atelectasis and bibasilar airspace opacities are similar to prior. No pneumothorax. No acute osseous abnormality. Severe degenerative changes of the bilateral shoulders are similar to prior. Interval placement of left subclavian approach central venous catheter. On the initial radiograph of 1249 hours, the catheter tip is directed superiorly.   The subsequent radiograph of 1321 hours demonstrates catheter tip in appropriate position in the expected location of the SVC. Right chest wall port as well as NG tube are unchanged in position. Interval placement of left subclavian approach central venous catheter with tip in the expected location of the SVC on radiograph of 1321 hours. NG tube and right chest wall port remain in appropriate position. Trace bilateral pleural effusions with bibasilar airspace opacities are overall similar to 11/20/2021. XR CHEST PORTABLE    Result Date: 12/13/2021  EXAMINATION: ONE XRAY VIEW OF THE CHEST 12/13/2021 12:34 pm COMPARISON: Chest radiograph 11/28/2021 HISTORY: ORDERING SYSTEM PROVIDED HISTORY: CVC placement TECHNOLOGIST PROVIDED HISTORY: Reason for exam:->CVC placement Reason for Exam: CVC placement FINDINGS: Cardiomediastinal silhouette is unchanged. Trace bilateral pleural effusions with associated atelectasis and bibasilar airspace opacities are similar to prior. No pneumothorax. No acute osseous abnormality. Severe degenerative changes of the bilateral shoulders are similar to prior. Interval placement of left subclavian approach central venous catheter. On the initial radiograph of 1249 hours, the catheter tip is directed superiorly. The subsequent radiograph of 1321 hours demonstrates catheter tip in appropriate position in the expected location of the SVC. Right chest wall port as well as NG tube are unchanged in position. Interval placement of left subclavian approach central venous catheter with tip in the expected location of the SVC on radiograph of 1321 hours. NG tube and right chest wall port remain in appropriate position. Trace bilateral pleural effusions with bibasilar airspace opacities are overall similar to 11/20/2021. XR CHEST PORTABLE    Result Date: 11/28/2021  EXAMINATION: ONE XRAY VIEW OF THE CHEST 11/28/2021 12:27 pm COMPARISON: Chest radiograph 08/17/2021.  HISTORY: ORDERING SYSTEM PROVIDED HISTORY: abodminal pain TECHNOLOGIST PROVIDED HISTORY: Reason for exam:->abodminal pain Reason for Exam: abd pain FINDINGS: A right chest port terminates in the mid superior vena cava. The cardiomediastinal silhouette is within normal limits. Shallow inspiration. Possible small effusions. Mild bibasilar opacities. No pneumothorax or vascular congestion. No acute osseous abnormality. Shallow inspiration with mild bibasilar opacities compatible with atelectasis. Possible small effusions. ED Course and MDM   -  Patient seen and evaluated in the emergency department. -  Triage and nursing notes reviewed and incorporated. -  Old chart records reviewed and incorporated. -  Supervising physician was Dr. Kathie Horton. He saw and examined patient. -  Work-up included:  See above  -  ED medications:  NS, morphine, Zofran, potassium, Zosyn  -  Results discussed with patient--CT shows free air concerning for bowel perforation. Consult placed to Dr. Benigno Dee, General Surgery. Dr. Kathie Horton to continue to follow after the end of my shift. Please see his note for further details. CRITICAL CARE NOTE:  There was a high probability of clinically significant life-threatening deterioration of the patient's condition requiring my urgent intervention due to perforated bowel. Telemetry monitoring, review of labs and monitoring, IV medications was performed to address this. Total critical care time is at least  35 minutes. This includes vital sign monitoring, pulse oximetry monitoring, telemetry monitoring, clinical response to the IV medications, reviewing the nursing notes, consultation time, dictation/documentation time, and interpretation of the lab work. This time excludes time spent performing procedures and separately billable procedures and family discussion time.     In light of current events, I did utilize appropriate PPE (including N95 and surgical face mask, safety glasses, and gloves, as recommended by the health facility/national standard best practice, during my bedside interactions with the patient. Final Impression      1. Bowel perforation ([address]    2. Lactic acidosis    3. Rectal cancer ([address]    4. Hypokalemia    5.  Abdominal pain, unspecified abdominal location            [address], [address] [place], [place]  12/13/21 [id number]

## 2021-12-13 NOTE — H&P
History and Physical 21        NAME: Alecia Arredondo  : 1945  MRN: [id number]      Assessment/Plan:  Alecia Arredondo is a 64 y.o. male with a history of  metastatic rectal adenocarcinoma, coronary artery disease, hypertension, and hyperlipidemia with recent admission  with the col who presented to [facility] 2021 with progressive abdominal pain and distention for the past 3 days. Concerns for perforated bowel on CT and admitted for urgent surgical intervention 2021 by Dr. Jack Fagan. Admit to ICU post op    1. Perforated bowel with septic shock: Hypotensive in the emergency room requiring pressors. Labs showed WBC 2.3, hemoglobin 12, lactate 4.1. Emergent exploratory laparotomy per Dr. Jack Fagan 21. Checking blood cultures. Patient was given IV Zosyn x1 in the emergency room and started on Flagyl per general surgery. Suspect could discontinue IV Zosyn postoperatively but will discuss with general surgery. 2. Metastatic rectal adenocarcinoma: With mets to the liver and follows with Dr. Leeann Cochran (on  Xeloda). Will consult oncology. 3. Severe hypokalemia: Potassium 2.8 suspect secondary to perforated bowel. Given IV in the emergency room and will initiate electrolyte replacement protocol. Magnesium 1.9.  4. Leukopenia: WBC 2.3 and suspect related to sepsis as well as history of malignancy on chemotherapy. Patient also has mild anemia. Will monitor CBC. Hematology/oncology consult as above. 5. CAD: Per history, continue atenolol and simvastatin. Resume aspirin, statin, BB when able post op. 6. Essential HTN: hypotensive and requiring pressors on admit. Holding home meds and will add back as able and needed. 7. Hyperlipidemia: Per history, continue home statin. 8. DVT Prophylaxis: Subcutaneous Lovenox postoperatively  9.  Code Status: Full code      Current living situation: home  Expected Disposition: TBD  Estimated discharge date: TBD, suspect pt will be here a long time      Chief Complaint:    Abdominal pain, peforated bowel    History of Present Illness:    Patient is a 77-year-old male with a past medical history of metastatic rectal adenocarcinoma, coronary artery disease, hypertension, and hyperlipidemia with recent admission 11/281111/29/2021 with the colitis presented with progressive abdominal pain and nausea for the past 3 days. Patient was seen by GI and general surgery on previous admission and was discharged on a course of Cipro and Flagyl. Patient has chronic diarrhea, but no bowel movement recently with progressive distention. Labs showed severe hypokalemia with potassium 2.8, lactate 4.1, glucose 106, hemoglobin 12, WBC 2.3. CT A/P showed large amount of free intraperitoneal air with concerns for perforated bowel. There is a focal area of narrowing within the rectosigmoid region related to malignancy. Patient had small bilateral pleural effusions and a 1.5 x 1.6 hypodense lesion in the right hepatic lobe concerning for metastatic lesion. Patient also has avascular necrosis of the right femoral head without evidence of subchondral collapse. Admitted with plan for exploratory laparotomy by Dr. Benigno Dee 12/13. Patient was given dose of Zosyn and started on Flagyl by Dr. Benigno Dee. Will discuss abx post op. Suspect Zosyn sufficient, concerns for septic shock and patient was also started on pressors in the emergency room. ROS:    Review of Systems   Constitutional: Negative. HENT: Negative. Eyes: Negative. Respiratory: Negative. Cardiovascular: Negative. Gastrointestinal: Positive for abdominal distention, abdominal pain, constipation and nausea. Negative for vomiting. Endocrine: Negative. Genitourinary: Negative. Musculoskeletal: Negative. Skin: Negative. Allergic/Immunologic: Negative. Neurological: Negative. Hematological: Negative. Psychiatric/Behavioral: Negative.          Past Medical, Surgical, Social, Family History:   Past Medical History:   Diagnosis Date    Cancer ([address]     rectal cancer    Hyperlipidemia     Hypertension      Past Surgical History:   Procedure Laterality Date    CT NEEDLE BIOPSY LUNG PERCUTANEOUS  2021    CT NEEDLE BIOPSY LUNG PERCUTANEOUS 2021 [facility] CT SCAN    HAND SURGERY      IR PORT PLACEMENT EQUAL OR GREATER THAN 5 YEARS  8/10/2021    IR PORT PLACEMENT EQUAL OR GREATER THAN 5 YEARS 8/10/2021 [facility] SPECIAL PROCEDURES     Social History     Socioeconomic History    Marital status:      Spouse name: Not on file    Number of children: Not on file    Years of education: Not on file    Highest education level: Not on file   Occupational History    Not on file   Tobacco Use    Smoking status: Former Smoker     Packs/day: 0.25     Years: 6.00     Pack years: 1.50     Start date: 1981     Quit date:      Years since quittin.9    Smokeless tobacco: Never Used   Substance and Sexual Activity    Alcohol use: No    Drug use: No    Sexual activity: Not on file   Other Topics Concern    Not on file   Social History Narrative    Not on file     Social Determinants of Health     Financial Resource Strain:     Difficulty of Paying Living Expenses: Not on file   Food Insecurity:     Worried About [address] in the Last Year: Not on file    [address] in the Last Year: Not on file   Transportation Needs:     Lack of Transportation (Medical): Not on file    Lack of Transportation (Non-Medical):  Not on file   Physical Activity:     Days of Exercise per Week: Not on file    Minutes of Exercise per Session: Not on file   Stress:     Feeling of Stress : Not on file   Social Connections:     Frequency of Communication with Friends and Family: Not on file    Frequency of Social Gatherings with Friends and Family: Not on file    Attends Mu-ism Services: Not on file    Active Member of Clubs or Organizations: Not on file    Attends Club or Organization Meetings: Not on file    Marital Status: Not on file   Intimate Partner Violence:     Fear of Current or Ex-Partner: Not on file    Emotionally Abused: Not on file    Physically Abused: Not on file    Sexually Abused: Not on file   Housing Stability:     Unable to Pay for Housing in the Last Year: Not on file    Number of [place] in the Last Year: Not on file    Unstable Housing in the Last Year: Not on file     Family History   Problem Relation Age of Onset    High Blood Pressure Father     Heart Disease Father     Stroke Sister        Home Medications:  Prior to Admission medications    Medication Sig Start Date End Date Taking? Authorizing Provider   atenolol (TENORMIN) 25 MG tablet  12/3/21   Historical Provider, MD   diphenoxylate-atropine (DIPHENATOL) 2.5-0.025 MG per tablet Take 1 tablet by mouth 4 times daily as needed for Diarrhea for up to 15 days. 12/3/21 12/18/21  Yesica Blanca MD   HYDROcodone-acetaminophen (NORCO) 5-325 MG per tablet Take 1 tablet by mouth every 8 hours as needed for Pain for up to 15 days. Intended supply: 3 days. Take lowest dose possible to manage pain 12/3/21 12/18/21  Yesica Blanca MD   capecitabine (XELODA) 500 MG chemo tablet Take 3 tablets (1,500 mg total) by mouth 2 times daily for 25 days 12/1/21 12/26/21  Yesica Blanca MD   ondansetron (ZOFRAN) 8 MG tablet Take 1 tablet by mouth every 8 hours as needed for Nausea or Vomiting 12/1/21   Yesica Blanca MD   dronabinol (MARINOL) 2.5 MG capsule Take 1 capsule by mouth 2 times daily (before meals) for 30 days. 12/1/21 12/31/21  Yesica Blanca MD   cholestyramine (QUESTRAN) 4 g packet Take 1 packet by mouth 2 times daily 11/24/21   Yesica Blanca MD   loperamide (RA ANTI-DIARRHEAL) 2 MG capsule Take 2 tablets with the first loose stool and then 1 tablet with each loose stool until stool starts to form. Do not take more than 8 tablets in 1 day.  11/18/21   Yesica Blanca MD   cephALEXin (KEFLEX) 500 MG reduce the radiation dose to as low as reasonably achievable. COMPARISON: None. HISTORY: ORDERING SYSTEM PROVIDED HISTORY: abd pain, constipation x 3 days, hx rectal cancer TECHNOLOGIST PROVIDED HISTORY: Additional Contrast?->None Reason for exam:->abd pain, constipation x 3 days, hx rectal cancer Decision Support Exception - unselect if not a suspected or confirmed emergency medical condition->Emergency Medical Condition (MA) Reason for Exam: abd pain, constipation x 3 days, hx rectal cancer FINDINGS: Lower Chest: Small bilateral pleural effusions are noted. Bilateral lower lobe atelectasis is present. Organs: Cholelithiasis is present. Gas is noted along the biliary system. There is a focal indeterminate hypodense lesion within the right hepatic lobe which measures 1.5 x 1.6 cm. This is new since the prior examination. The pancreas, adrenal glands, spleen, and kidneys are unremarkable. GI/Bowel: Distended loops of colon are noted. There is a focal area of narrowing in the rectosigmoid region which could be related to a mass or stricture. This likely results in large bowel obstruction. There is a large amount of free intraperitoneal air, consistent with perforation of bowel. Pelvis: The bladder is unremarkable. There is a small amount of fluid within the pelvis. Peritoneum/Retroperitoneum: Large amount of free intraperitoneal air is noted. Minimal fluid is seen within the abdomen and pelvis. Bones/Soft Tissues: No destructive osseous lesions are identified. Avascular necrosis of the right femoral head is noted without evidence of subchondral collapse. No evidence of osseous metastatic disease. 1. Large amount of free intraperitoneal air is noted, consistent with perforated bowel. Surgical consultation is recommended. 2. There is a focal area of narrowing within the rectosigmoid region which results in large bowel obstruction. This is likely related to patient's known rectosigmoid neoplasm.  3. Minimal fluid is seen within the abdomen and pelvis. 4. Small bilateral pleural effusions with adjacent atelectasis. 5. Cholelithiasis. 6. 1.5 x 1.6 cm hypodense lesion within the right hepatic lobe. Findings are concerning for a metastatic lesion. This can be further evaluated with MRI of the liver. 7. Avascular necrosis of the right femoral head without evidence of subchondral collapse. Findings were discussed with LISA SAINZ at 6:01 a.m. on 12/13/2021. RECOMMENDATIONS: Unavailable       CBC:   Recent Labs     12/13/21 0417   WBC 2.3*   HGB 12.0*        BMP:    Recent Labs     12/13/21 0417      K 2.8*      CO2 25   BUN 26*   CREATININE 1.1   GLUCOSE 106*     Hepatic:   Recent Labs     12/13/21 0417   AST 15   ALT 7*   BILITOT 0.8   ALKPHOS 95     Lipids:   Lab Results   Component Value Date    CHOL 135 06/17/2011    HDL 40 06/17/2011    TRIG 165 06/17/2011     Hemoglobin A1C: No results found for: LABA1C  TSH: No results found for: TSH  Troponin: No results found for: TROPONINT  Lactic Acid: No results for input(s): LACTA in the last 72 hours. BNP: No results for input(s): PROBNP in the last 72 hours.   UA:  Lab Results   Component Value Date    NITRU NEGATIVE 11/28/2021    COLORU YELLOW 11/28/2021    WBCUA NONE SEEN 11/28/2021    RBCUA NONE SEEN 11/28/2021    TRICHOMONAS NONE SEEN 11/28/2021    BACTERIA NEGATIVE 11/28/2021    CLARITYU CLEAR 11/28/2021    SPECGRAV 1.020 11/28/2021    LEUKOCYTESUR NEGATIVE 11/28/2021    UROBILINOGEN 2.0 11/28/2021    BILIRUBINUR NEGATIVE 11/28/2021    BLOODU NEGATIVE 11/28/2021    KETUA NEGATIVE 11/28/2021     Urine Cultures: No results found for: Ijeoma Hercules  Blood Cultures: No results found for: BC  No results found for: BLOODCULT2  Organism: No results found for: [facility]          Electronically signed by Noemi Cha MD on 12/13/2021 at 9:15 AM

## 2021-12-13 NOTE — ANESTHESIA PRE PROCEDURE
Department of Anesthesiology  Preprocedure Note       Name:  Meera Bell   Age:  64 y.o.  :  1945                                          MRN:  [id number]         Date:  2021      Surgeon: Roddy Kent):  Jean Carlos Pedro MD    Procedure: Procedure(s):  LAPAROTOMY EXPLORATORY, POSSIBLE COLOSTOMY    Medications prior to admission:   Prior to Admission medications    Medication Sig Start Date End Date Taking? Authorizing Provider   atenolol (TENORMIN) 25 MG tablet  12/3/21   Historical Provider, MD   diphenoxylate-atropine (DIPHENATOL) 2.5-0.025 MG per tablet Take 1 tablet by mouth 4 times daily as needed for Diarrhea for up to 15 days. 12/3/21 12/18/21  Javed Ayers MD   HYDROcodone-acetaminophen (NORCO) 5-325 MG per tablet Take 1 tablet by mouth every 8 hours as needed for Pain for up to 15 days. Intended supply: 3 days. Take lowest dose possible to manage pain 12/3/21 12/18/21  Javed Ayers MD   capecitabine (XELODA) 500 MG chemo tablet Take 3 tablets (1,500 mg total) by mouth 2 times daily for 25 days 21  Javed Ayers MD   ondansetron (ZOFRAN) 8 MG tablet Take 1 tablet by mouth every 8 hours as needed for Nausea or Vomiting 21   Javed Ayers MD   dronabinol (MARINOL) 2.5 MG capsule Take 1 capsule by mouth 2 times daily (before meals) for 30 days. 21  Javed Ayers MD   cholestyramine (QUESTRAN) 4 g packet Take 1 packet by mouth 2 times daily 21   Javed Ayers MD   loperamide (RA ANTI-DIARRHEAL) 2 MG capsule Take 2 tablets with the first loose stool and then 1 tablet with each loose stool until stool starts to form. Do not take more than 8 tablets in 1 day.  21   Javed Ayers MD   cephALEXin (KEFLEX) 500 MG capsule Take 1 capsule by mouth 3 times daily 10/21/21   Jean Carlos Pedro MD   OLANZapine (ZYPREXA) 5 MG tablet One tablet po HS for four nights starting the night of chemotherapy 21   Javed Ayers MD   dexamethasone (DECADRON) 4 MG tablet 1 tablet PO daily for 4 days starting the day after chemotherapy every two weeks. 9/20/21   Gina Peters MD   clorazepate (TRANXENE) 7.5 MG tablet TAKE 1 TABLET BY MOUTH ONCE DAILY 8/14/21   Historical Provider, MD   Lactobacillus (ACIDOPHILUS PROBIOTIC FORMULA) TABS TAKE 1 TABLET BY MOUTH ONCE DAILY 8/28/21   Historical Provider, MD   traZODone (DESYREL) 100 MG tablet  5/20/21   Historical Provider, MD   simvastatin (ZOCOR) 40 MG tablet Take 40 mg by mouth nightly. Historical Provider, MD   gabapentin (NEURONTIN) 300 MG capsule Take 300 mg by mouth nightly. Historical Provider, MD   aspirin 81 MG chewable tablet Take 81 mg by mouth daily. Historical Provider, MD   cilostazol (PLETAL) 50 MG tablet Take 1 tablet by mouth 2 times daily.  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) C34.12    Anemia D64.9    Colitis K52.9    Functional diarrhea K59.1    Edema of left lower extremity R60.0       Past Medical History:        Diagnosis Date    Cancer ([facility] [address]     rectal cancer    Hyperlipidemia     Hypertension        Past Surgical History:        Procedure Laterality Date    CT NEEDLE BIOPSY LUNG PERCUTANEOUS  8/17/2021    CT NEEDLE BIOPSY LUNG PERCUTANEOUS 8/17/2021 [facility] CT SCAN    HAND SURGERY      IR PORT PLACEMENT EQUAL OR GREATER THAN 5 YEARS  8/10/2021    IR PORT PLACEMENT EQUAL OR GREATER THAN 5 YEARS 8/10/2021 [facility] SPECIAL PROCEDURES       Social History:    Social History     Tobacco Use    Smoking status: Former Smoker     Packs/day: 0.25     Years: 6.00     Pack years: 1.50     Start date: 1981     Quit date:      Years since quittin.9    Smokeless tobacco: Never Used   Substance Use Topics    Alcohol use:  No                                Counseling given: Not Answered      Vital Signs (Current):   Vitals:    21 0645 21 0700 21 0753 21 0809   BP: (!) 95/48 (!) 99/54 (!) 106/50 (!) 93/51   Pulse: 94 95 93    Resp: 16  16    Temp:   36.3 °C (97.4 °F)    TempSrc:   Tympanic    SpO2: 99% 100% 98%                                               BP Readings from Last 3 Encounters:   21 (!) 93/51   21 (!) 124/57   21 (!) 109/52       NPO Status: Time of last liquid consumption: 003                        Time of last solid consumption: 0800                        Date of last liquid consumption: 21                        Date of last solid food consumption: 21    BMI:   Wt Readings from Last 3 Encounters:   21 185 lb (83.9 kg)   21 184 lb 12.8 oz (83.8 kg)   21 181 lb 5.4 oz (82.3 kg)     There is no height or weight on file to calculate BMI.    CBC:   Lab Results   Component Value Date    WBC 2.3 2021    RBC 3.71 2021    HGB 12.0 2021    HCT 39.7 2021    .0 2021    RDW 16.6 2021     2021       CMP:   Lab Results   Component Value Date     2021    K 2.8 2021     2021    CO2 25 2021    BUN 26 2021    CREATININE 1.1 2021    GFRAA >60 2021    LABGLOM >60 2021    GLUCOSE 106 2021    PROT 5.5 2021    PROT 6.7 2011    CALCIUM 8.6 2021    BILITOT 0.8 2021    ALKPHOS 95 2021    AST 15 2021    ALT 7 2021       POC Tests: No results for input(s): POCGLU, POCNA, POCK, POCCL, POCBUN, POCHEMO, POCHCT in the last 72 hours. Coags:   Lab Results   Component Value Date    PROTIME 13.8 08/10/2021    INR 1.07 08/10/2021    APTT 31.2 08/10/2021       HCG (If Applicable): No results found for: PREGTESTUR, PREGSERUM, HCG, HCGQUANT     ABGs: No results found for: PHART, PO2ART, LGD4BJM, RQQ4ZPD, BEART, F3VVUBNZ     Type & Screen (If Applicable):  No results found for: LABABO, LABRH    Drug/Infectious Status (If Applicable):  No results found for: HIV, HEPCAB    COVID-19 Screening (If Applicable):   Lab Results   Component Value Date    COVID19 NOT DETECTED 12/13/2021           Anesthesia Evaluation  Patient summary reviewed  Airway: Mallampati: II        Dental:    (+) edentulous      Pulmonary: breath sounds clear to auscultation  (+) shortness of breath:                            ROS comment: dax cancer    Cardiovascular:    (+) hypertension:, hyperlipidemia        Rhythm: regular                   ROS comment: Rt PAC in situ     Neuro/Psych:               GI/Hepatic/Renal:   (+) bowel prep,          ROS comment: Bowel perf. Endo/Other:    (+) blood dyscrasia: anticoagulation therapy and anemia:., electrolyte abnormalities, malignancy/cancer. ROS comment: Rectal cancer and lung ca on oral chemo  Abdominal:       Abdomen: tender. PE comment: distended   Vascular: Other Findings:           Anesthesia Plan      general     ASA 4 - emergent       Induction: intravenous. arterial line and central line  MIPS: Postoperative opioids intended and Prophylactic antiemetics administered. Anesthetic plan and risks discussed with patient. Use of blood products discussed with patient whom consented to blood products. Plan discussed with CRNA.     Attending anesthesiologist reviewed and agrees with Preprocedure content          RYAN Clark - CRNA   12/13/2021

## 2021-12-13 NOTE — PROGRESS NOTES
Pt having intermittent episodes of bigemeny, DBP in the 30's  Map remains under 60, Levo running at 53 Ron at 75. Hospitalist notified via perfect serve.   Will continue to titrate pressors to a map > 60

## 2021-12-14 NOTE — PROGRESS NOTES
Pt stated he's tired of suffering. This RN asked pt if he wanted to be coded if his heart were to stop, pt stated \" no I dont want to be brought back, I dont want to be shocked nor do I want compressions done. I just want to be let go. \" Updated his daughter Kimmie Lara this morning she stated he just needs to push through this and get better. The whole family is pulling for him. I explained to her he's on 3 pressors and not doing well at this time.    Hospitalist contacted regarding code status change and palliative care consult

## 2021-12-14 NOTE — PROGRESS NOTES
Inpatient Progress Note 12/14/2021        Sergio Leger  1945  [id number]      Assessment/Plan:  Sergio Leger is a 64 y.o. male with a history of  metastatic rectal adenocarcinoma, coronary artery disease, hypertension, and hyperlipidemia with recent admission 11/2811/29/2021 with the col who presented to [facility] 12/13/2021 with progressive abdominal pain and distention for the past 3 days. Concerns for perforated bowel on CT and admitted for urgent surgical intervention 12/13/2021 by Dr. Lissy De Jesus. Admit to ICU post op     1. Perforated bowel with septic shock: Hypotensive in the emergency room requiring pressors. Labs showed WBC 2.3, hemoglobin 12, lactate 4.1. S/p Emergent exploratory laparotomy with right hemicolectomy and loop ileostomy per Dr. Lissy De Jesus 12/13/21. Checking blood cultures. Patient was given IV Zosyn x1 in the emergency room and started on Flagyl. Flagyl stopped 12/13/21. Currently maxed out on three pressors and critically ill. Poor prognosis and discussed with patient, who was lucid and well oriented at that time, and pt wanted to be made comfort care. Discussed with family who were agreeable to conversation. Nurses witnessed conversations. Initiating pain medications and Ativan IV prn.   2. Metastatic rectal adenocarcinoma: With mets to the liver and follows with Dr. Delray Gosselin (on  Xeloda). [facility]. 3. Severe hypokalemia: Potassium 2.8 suspect secondary to perforated bowel. Given IV in the emergency room and was initiated electrolyte replacement protocol. 4. Leukopenia: WBC 2.3 and suspect related to sepsis as well as history of malignancy on chemotherapy. Patient also has mild anemia. Monitored. 5. CAD: Per history, home meds not continued due to NPO and critically ill. [facility]. 6. Hyperlipidemia: Per history, continue home statin. 7. DVT Prophylaxis: Subcutaneous Lovenox postoperatively  8.  Code Status: [facility] confirmed with patient 12/14/21 and multiple nurses PROVIDED HISTORY: abd pain, constipation x 3 days, hx rectal cancer TECHNOLOGIST PROVIDED HISTORY: Additional Contrast?->None Reason for exam:->abd pain, constipation x 3 days, hx rectal cancer Decision Support Exception - unselect if not a suspected or confirmed emergency medical condition->Emergency Medical Condition (MA) Reason for Exam: abd pain, constipation x 3 days, hx rectal cancer FINDINGS: Lower Chest: Small bilateral pleural effusions are noted. Bilateral lower lobe atelectasis is present. Organs: Cholelithiasis is present. Gas is noted along the biliary system. There is a focal indeterminate hypodense lesion within the right hepatic lobe which measures 1.5 x 1.6 cm. This is new since the prior examination. The pancreas, adrenal glands, spleen, and kidneys are unremarkable. GI/Bowel: Distended loops of colon are noted. There is a focal area of narrowing in the rectosigmoid region which could be related to a mass or stricture. This likely results in large bowel obstruction. There is a large amount of free intraperitoneal air, consistent with perforation of bowel. Pelvis: The bladder is unremarkable. There is a small amount of fluid within the pelvis. Peritoneum/Retroperitoneum: Large amount of free intraperitoneal air is noted. Minimal fluid is seen within the abdomen and pelvis. Bones/Soft Tissues: No destructive osseous lesions are identified. Avascular necrosis of the right femoral head is noted without evidence of subchondral collapse. No evidence of osseous metastatic disease. 1. Large amount of free intraperitoneal air is noted, consistent with perforated bowel. Surgical consultation is recommended. 2. There is a focal area of narrowing within the rectosigmoid region which results in large bowel obstruction. This is likely related to patient's known rectosigmoid neoplasm. 3. Minimal fluid is seen within the abdomen and pelvis.  4. Small bilateral pleural effusions with adjacent atelectasis. 5. Cholelithiasis. 6. 1.5 x 1.6 cm hypodense lesion within the right hepatic lobe. Findings are concerning for a metastatic lesion. This can be further evaluated with MRI of the liver. 7. Avascular necrosis of the right femoral head without evidence of subchondral collapse. Findings were discussed with LISA SAINZ at 6:01 a.m. on 12/13/2021. RECOMMENDATIONS: Unavailable     XR CHEST PORTABLE    Result Date: 12/13/2021  EXAMINATION: ONE XRAY VIEW OF THE CHEST 12/13/2021 12:34 pm COMPARISON: Chest radiograph 11/28/2021 HISTORY: ORDERING SYSTEM PROVIDED HISTORY: CVC placement TECHNOLOGIST PROVIDED HISTORY: Reason for exam:->CVC placement Reason for Exam: CVC placement FINDINGS: Cardiomediastinal silhouette is unchanged. Trace bilateral pleural effusions with associated atelectasis and bibasilar airspace opacities are similar to prior. No pneumothorax. No acute osseous abnormality. Severe degenerative changes of the bilateral shoulders are similar to prior. Interval placement of left subclavian approach central venous catheter. On the initial radiograph of 1249 hours, the catheter tip is directed superiorly. The subsequent radiograph of 1321 hours demonstrates catheter tip in appropriate position in the expected location of the SVC. Right chest wall port as well as NG tube are unchanged in position. Interval placement of left subclavian approach central venous catheter with tip in the expected location of the SVC on radiograph of 1321 hours. NG tube and right chest wall port remain in appropriate position. Trace bilateral pleural effusions with bibasilar airspace opacities are overall similar to 11/20/2021.      XR CHEST PORTABLE    Result Date: 12/13/2021  EXAMINATION: ONE XRAY VIEW OF THE CHEST 12/13/2021 12:34 pm COMPARISON: Chest radiograph 11/28/2021 HISTORY: ORDERING SYSTEM PROVIDED HISTORY: CVC placement TECHNOLOGIST PROVIDED HISTORY: Reason for exam:->CVC placement Reason for Exam: Ca 1.07 (L) 1.12 - 1.32 mMOL/L    Calcium, Ion 4.28 (L) 4.48 - 5.28 MG/DL   Magnesium    Collection Time: 12/14/21  7:35 AM   Result Value Ref Range    Magnesium 1.6 (L) 1.8 - 2.4 mg/dl   Phosphorus    Collection Time: 12/14/21  7:35 AM   Result Value Ref Range    Phosphorus 3.7 2.5 - 4.9 MG/DL   CBC auto differential    Collection Time: 12/14/21  7:35 AM   Result Value Ref Range    WBC 8.1 4.0 - 10.5 K/CU MM    RBC 3.27 (L) 4.6 - 6.2 M/CU MM    Hemoglobin 10.7 (L) 13.5 - 18.0 GM/DL    Hematocrit 36.3 (L) 42 - 52 %    .0 (H) 78 - 100 FL    MCH 32.7 (H) 27 - 31 PG    MCHC 29.5 (L) 32.0 - 36.0 %    RDW 17.2 (H) 11.7 - 14.9 %    Platelets 668 674 - 832 K/CU MM    MPV 10.4 7.5 - 11.1 FL       Electronically signed by Marquita Ventura MD on 12/14/2021 at 10:38 AM

## 2021-12-14 NOTE — CARE COORDINATION
Cm spoke with RN. Pt admitted with Bowel Perforation. Pt is POD# 1 Exp lap/R Hemicolectomy/Loop Ileostomy Placement. Pt now on 3 pressors. Code Status changed per pt request to [facility]. Cm available as needed.

## 2021-12-14 NOTE — PROGRESS NOTES
Dr Tina Cole at bedside pt able to answer all mentation questions. Pt stated he wanted to be made comfortable, stop being aggressive with care. Pt maxed out on 3 pressors.   Angelika youssef at bedside

## 2021-12-14 NOTE — PROGRESS NOTES
Spoke with wife at bedside about  home choices. Gave her my work cell number to call with  home information.

## 2021-12-14 NOTE — PROGRESS NOTES
Spoke with pts sister Jose Shelton, retired RN, updated her on pts condition and on 3 pressors. Explained pts desire not to be intubated or coded and pt stated to this writer he wanted to be let go. Pts wife currently has dementia, has a son in [address] Dr Mamie Ibrahim at bedside and updated him on pts status. He ordered 1 mg of diluadid to keep him comfortable.   BP's remain unstable

## 2021-12-14 NOTE — CONSULTS
[facility] Continence Nurse  Consult Note       Teddy Kent  AGE: 64 y.o. GENDER: male  : 1945  TODAY'S DATE:  2021    Subjective:     Reason for  Evaluation and Assessment: wound care eval.        Teddy Kent is a 64 y.o. male referred by:   [x] Physician  [] Nursing  [] Other:     Wound Identification:  Wound Type: prevena over midline incision and new colostomy  Contributing Factors: malnutrition and metastatic rectal cancer         PAST MEDICAL HISTORY        Diagnosis Date    Cancer [facility])     rectal cancer    Hyperlipidemia     Hypertension        PAST SURGICAL HISTORY    Past Surgical History:   Procedure Laterality Date    CT NEEDLE BIOPSY LUNG PERCUTANEOUS  2021    CT NEEDLE BIOPSY LUNG PERCUTANEOUS 2021 [address] [place] [address] CT SCAN    HAND SURGERY      IR PORT PLACEMENT EQUAL OR GREATER THAN 5 YEARS  8/10/2021    IR PORT PLACEMENT EQUAL OR GREATER THAN 5 YEARS 8/10/2021 [facility] SPECIAL PROCEDURES    LAPAROTOMY N/A 2021    LAPAROTOMY EXPLORATORY, RIGHT HEMICOLECTOMY, LOOP ILEOSTOMY PLACEMENT performed by Wilmer Cuevas MD at [facility] OR       FAMILY HISTORY    Family History   Problem Relation Age of Onset    High Blood Pressure Father     Heart Disease Father     Stroke Sister        SOCIAL HISTORY    Social History     Tobacco Use    Smoking status: Former Smoker     Packs/day: 0.25     Years: 6.00     Pack years: 1.50     Start date: 1981     Quit date:      Years since quittin.9    Smokeless tobacco: Never Used   Substance Use Topics    Alcohol use: No    Drug use: No       ALLERGIES    No Known Allergies    MEDICATIONS    No current facility-administered medications on file prior to encounter.      Current Outpatient Medications on File Prior to Encounter   Medication Sig Dispense Refill    atenolol (TENORMIN) 25 MG tablet       diphenoxylate-atropine (DIPHENATOL) 2.5-0.025 MG per tablet Take 1 tablet by mouth 4 times daily as needed for Diarrhea for LABS    CBC:   Lab Results   Component Value Date    WBC 8.1 12/14/2021    RBC 3.27 12/14/2021    HGB 10.7 12/14/2021    HCT 36.3 12/14/2021    .0 12/14/2021    MCH 32.7 12/14/2021    MCHC 29.5 12/14/2021    RDW 17.2 12/14/2021     12/14/2021    MPV 10.4 12/14/2021     CMP:    Lab Results   Component Value Date     12/14/2021    K 4.9 12/14/2021     12/14/2021    CO2 14 12/14/2021    BUN 28 12/14/2021    CREATININE 1.4 12/14/2021    GFRAA 59 12/14/2021    LABGLOM 49 12/14/2021    GLUCOSE 117 12/14/2021    PROT 3.8 12/14/2021    PROT 6.7 06/17/2011    LABALBU 2.4 12/14/2021    CALCIUM 7.4 12/14/2021    BILITOT 0.5 12/14/2021    ALKPHOS 50 12/14/2021    AST 27 12/14/2021    ALT 7 12/14/2021     Albumin:    Lab Results   Component Value Date    LABALBU 2.4 12/14/2021     PT/INR:    Lab Results   Component Value Date    PROTIME 13.8 08/10/2021    INR 1.07 08/10/2021     HgBA1c:  No results found for: LABA1C      Assessment:     Patient Active Problem List   Diagnosis    [facility]    Lung nodule    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 No 12/14/21 1000   Dressing Status Intact 12/14/21 1000   Dressing Changed Dressing reinforced 12/14/21 1000   Number of days: 1       Response to treatment:  Well tolerated by patient.      Pain Assessment:  Severity:  none  Quality of pain:   Wound Pain Timing/Severity:   Premedicated: yes    Plan:     Plan of Care:       Patient in bed agreeable to wound care eval. Pt has midline prevena vac dressing in place and new colostomy. Pt is alert but lethargic and is maxed out on 3 pressors at this time. Colostomy was leaking over part of the prevena dressing. Pouching system bag did not match the size of the barrier applied a new bag to match the size and secured prevena dressing with more vac drape. Prevena dressing intact and functioning. Pt turned to lt side. Pt is at moderate risk for skin breakdown AEB annita. Follow annita orders. Wound care to continue with stoma education when pt is feeling better. Specialty Bed Required :  yes  [x] Low Air Loss   [] Pressure Redistribution  [] Fluid Immersion  [] Bariatric  [] Total Pressure Relief  [] Other:     Discharge Plan:  Placement for patient upon discharge: tbd  Hospice Care: no  Patient appropriate for Outpatient [address] [place] [address] Street: no    Patient/Caregiver Teaching:  Level of patient/caregiver understanding able to:   Cares explained as given. No evidence of learning. Electronically signed by Roberto Carlos Juan.  RICO James,  on 12/14/2021 at 1:34 PM

## 2021-12-14 NOTE — PROGRESS NOTES
julietteist contacted about consulting pallative and code status change    Pt on 3 pressors at this time

## 2021-12-14 NOTE — PROGRESS NOTES
Updated pt's son Paulino Chandler in [address] [facility] state on pts status. Explained he's not doing well and maxed out on 3 pressors. Pt's son wants to keep pt a full code at this time.

## 2021-12-14 NOTE — FLOWSHEET NOTE
Wife, Liv Barger contacted given house supervisor number to inform of which  home pt will go to when she finds her paperwork .

## 2021-12-14 NOTE — PROGRESS NOTES
Spoke with pts sister jaymie sky and Barbara Guzman pts daughter in law about  home. Pt had a prepaid  and family cant find the paper work for it. They stated they would call back as soon as its found.

## 2021-12-14 NOTE — DEATH NOTES
Death Pronouncement Note  Patient's Name: Jose Dumont   Patient's YOB: 1945  MRN Number: [id number]    Admitting Provider: Malena Cristobal MD  Attending Provider: Malena Cristobal MD    Patient was examined and the following were absent: pulses, spontaneous respirations.      I declared the patient dead on  12/14/21 at 12:08    Preliminary Cause of Death:   Septic Shock due to Perforated Bowel    Electronically signed by Malena Cristobal MD on 12/14/21 at 12:36 PM EST

## 2021-12-14 NOTE — PROGRESS NOTES
, security, nursing supv and consulting physicians notified of pts death.  Donor referral hotline contacted by this writer

## 2021-12-14 NOTE — CONSULTS
RENAL DOSE ADJUSTMENT MADE PER P/T PROTOCOL    PREVIOUS ORDER:  Ketorolac 30mg ivp q6h prn moderate to severe pain    Estimated Creatinine Clearance: 50 mL/min (A) (based on SCr of 1.4 mg/dL (H)). Recent Labs     12/13/21 0417 12/13/21 0417 12/14/21  0735   BUN 26*  --  28*   CREATININE 1.1   < > 1.4*      < > 223    < > = values in this interval not displayed. NEW RENALLY ADJUSTED ORDER:  KETOROLAC 15MG IVP Q6H PRN MODERATE TO SEVERE PAIN FOR AGE >65    Manuel Galloway [facility]  12/14/2021 3:04 PM

## 2021-12-15 NOTE — OP NOTE
Operative Report-      Patient ID:  Ki Nunn  [id number]  64 y.o.  1945    Indications: Perforated viscus with metastatic rectal cancer    Pre-operative Diagnosis: Perforated viscus     Post-operative Diagnosis: Perforated cecum from rectal cancer obstruction    Procedure:  1. Exploratory laparotomy with right hemicolectomy and diverting loop ileostomy placement    2. Wound VAC application less than 50 cm²    Surgeon: Robina Wren MD    First Assistant: Nisha Chawla PA-C  The  Use of a first assistant was necessary for the proper positioning, prepping, and draping of the patient, as well as the safe and expeditious execution of the case and closure of skin and subcutaneous tissues. Anesthesia: General Endotracheal Anesthesia    ASA Class: 5, e    Findings: Perforated cecum with fecal peritonitis    Estimated Blood Loss:  less than 100 ml           Drains: x1           Total IV Fluids: 500 ml           Specimens: Right colon             Complications:  None; patient tolerated the procedure well. Disposition: PACU - hemodynamically stable. Condition: stable        Procedure Details   The patient was seen again in the Holding Room. The risks, benefits, complications, treatment options, and expected outcomes were discussed with the patient. The possibilities of reaction to medication, pulmonary aspiration, perforation of viscus, bleeding, recurrent infection, the need for additional procedures, failure to diagnose a condition, the possible need to convert to an open procedure, and creating a complication requiring transfusion or operation were discussed with the patient. The patient and/or family concurred with the proposed plan, giving informed consent. The site of surgery properly noted/marked. The patient was taken to Operating Room, identified as Ki Nunn and the procedure verified as as above. A Time Out was held and the above information confirmed.        The patient was brought to the operating room and placed supine. Fernández catheter placed preoperatively without incidence. Central line was placed in the left subclavian by anesthesia. Then the abdomen was prepped and draped in the usual sterile fashion. The abdomen was massively distended. A midline incision was made, infra umbilicus down to the pubicsymphysis and the incision was deepened through the subcutaneous tissue and entering the peritoneum without incident using Bovie cautery. Upon entry to the peritoneum there was large amount of air was expelled. There is fecal contamination little to suction due to the large particles. She was then clear where the perforation was. The transverse colon descending colon and sigmoid were massively distended but did not appear to have any perforation. On further examination there was a small hole in the cecum which was leaking stool. Likely controlled by placing 2-0 silk suture around the perforation. It is clear that the patient could as a result of the backed up stool from the rectal cancer of the lumen. The right colon was mobilized lateral to medial at the white line of Toldt into the hepatic flexure. Using a ZHANNA stapler the terminal ileum was divided as well as the proximal portion of the transverse colon. The mesentery was taken down using LigaSure device with good hemostasis. The specimen waspassed to Pathology. The pelvis was copiously irrigated and suctioned. I was able to palpate the rectal cancer which was firm and immobile. The remainder of the small bowel and colon were without any pathology. Abdominal cavity was copiously irrigated. At this point I elected to proceed with the anastomosis of the ileum to the transverse colon is a side-to-side stapled anastomosis followed by the closure of the common channel enterotomy using 3-0 PDS in 2 layers. This was done with the idea of bringing out loop ileostomy. Parkring 7 drain was placed through lower

## 2021-12-16 ENCOUNTER — TELEPHONE (OUTPATIENT)
Dept: ONCOLOGY | Age: 76
End: 2021-12-16

## 2021-12-16 LAB
CULTURE: ABNORMAL
GRAM SMEAR: ABNORMAL
Lab: ABNORMAL
Lab: ABNORMAL
SPECIMEN: ABNORMAL
SPECIMEN: ABNORMAL

## 2021-12-16 NOTE — DISCHARGE SUMMARY
Discharge Summary    Name:  Hailee Quevedo /Age/Sex: 1945  (89 y.o. male)   MRN & CSN:  [id number] & [id number] Admission Date/Time: 2021  3:52 AM   Attending:  No att. providers found Discharging Physician: Asia Rosas MD     Hospital Course:   Assessment/Plan:  Quang Hager a 64 y. o. male with a history of  metastatic rectal adenocarcinoma, coronary artery disease, hypertension, and hyperlipidemia with recent admission  with the col who presented to [facility] 2021 with progressive abdominal pain and distention for the past 3 days.  Concerns for perforated bowel on CT and admitted for urgent surgical intervention 2021 by Dr. Quoc Monte. Admit to ICU post op. [facility] and passed at [id number] 21     Perforated bowel with septic shock: Hypotensive in the emergency room requiring pressors.  Labs showed WBC 2.3, hemoglobin 12, lactate 4.1.  S/p Emergent exploratory laparotomy with right hemicolectomy and loop ileostomy per Dr. Quoc Monte 21.  Checking blood cultures. Jero Dolan was given IV Zosyn x1 in the emergency room and started on Flagyl. Flagyl stopped 21. Currently maxed out on three pressors and critically ill. Poor prognosis and discussed with patient, who was lucid and well oriented at that time, and pt wanted to be made comfort care. Discussed with family who were agreeable to comfort measures. Nurses witnessed conversations. Initiated pain medications and Ativan IV prn. Metastatic rectal adenocarcinoma: With mets to the liver and follows with Dr. Valentine Galicia (on [place]). [facility] as above. Severe hypokalemia: Potassium 2.8 suspect secondary to perforated bowel.  Given IV in the emergency room and was initiated electrolyte replacement protocol while hospitalized. Leukopenia: WBC 2.3 and suspect related to sepsis as well as history of malignancy on chemotherapy.  Patient also has mild anemia. Monitored.   CAD: Per history, home meds not continued due to NPO and critically ill. [facility] as above.   Code Status: DNRCC confirmed with patient 12/14/21 and multiple nurses witnessed conversation. Does not want intubation or CPR. Okay to shut off pressors and pain control. Discussed with son in [facility] and sister at bedside who were in agreement. Continued pressor support until wife arrived to say good by. Pt passed as above. The patient expressed appropriate understanding of and agreement with the discharge recommendations, medications, and plan.      Consults this admission:  [address] TO IV TEAM  IP CONSULT TO HOSPITALIST  IP CONSULT TO [address] [place] [address]    Discharge Instruction:       Discharge Medications:             Discharge Time of 30 minutes    Electronically signed by Chet Brewster MD on 12/16/2021 at 7:27 AM

## 2021-12-16 NOTE — TELEPHONE ENCOUNTER
Notified [employer], that patient has passed and Xeloda Free Drug will not be needed.  Application/Account will be closed

## (undated) DEVICE — PACK,BASIC,SIRUS,V: Brand: MEDLINE

## (undated) DEVICE — DRAIN SURG 15FR SIL RND CHN W/ TRCR FULL FLUT DBL WRP TRAD

## (undated) DEVICE — PENCIL ES CRD L10FT HND SWCHING ROCK SWCH W/ EDGE COAT BLDE

## (undated) DEVICE — RELOAD STPL L75MM OPN H3.8MM CLS 1.5MM WIRE DIA0.2MM REG

## (undated) DEVICE — SUTURE PERMAHAND SZ 2-0 L18IN NONABSORBABLE BLK L26MM SH C012D

## (undated) DEVICE — GAUZE,SPONGE,4"X4",16PLY,XRAY,STRL,LF: Brand: MEDLINE

## (undated) DEVICE — TOWEL,OR,DSP,ST,BLUE,STD,6/PK,12PK/CS: Brand: MEDLINE

## (undated) DEVICE — WOUND RETRACTOR AND PROTECTOR: Brand: ALEXIS O WOUND PROTECTOR-RETRACTOR

## (undated) DEVICE — GLOVE SURG SZ 7 CRM LTX FREE POLYISOPRENE POLYMER BEAD ANTI

## (undated) DEVICE — GLOVE SURG SZ 65 L12IN FNGR THK79MIL GRN LTX FREE

## (undated) DEVICE — RESERVOIR,SUCTION,100CC,SILICONE: Brand: MEDLINE

## (undated) DEVICE — SUTURE STRATAFIX SYMMETRIC SZ 1 L18IN ABSRB VLT CT1 L36CM SXPP1A404

## (undated) DEVICE — BLADE ES L6IN ELASTOMERIC COAT EXT DURABLE BEND UPTO 90DEG

## (undated) DEVICE — SEALER ENDOSCP NANO COAT OPN DIV CRV L JAW LIGASURE IMPACT

## (undated) DEVICE — CATHETER URETH 18FR RED RUB INTMIT ALL PURP

## (undated) DEVICE — CONTAINER,SPECIMEN,OR STERILE,4OZ: Brand: MEDLINE

## (undated) DEVICE — INTENDED FOR TISSUE SEPARATION, AND OTHER PROCEDURES THAT REQUIRE A SHARP SURGICAL BLADE TO PUNCTURE OR CUT.: Brand: BARD-PARKER ® STAINLESS STEEL BLADES

## (undated) DEVICE — SYRINGE 20ML LL S/C 50

## (undated) DEVICE — TUBING, SUCTION, 9/32" X 10', STRAIGHT: Brand: MEDLINE

## (undated) DEVICE — SUTURE VCRL SZ 3-0 L27IN ABSRB UD L36MM CT-1 1/2 CIR J258H

## (undated) DEVICE — SUTURE PDS II SZ 3-0 L18IN ABSRB VLT L26MM SH TAPERPOINT Z774D

## (undated) DEVICE — GLOVE SURG SZ 6 THK91MIL LTX FREE SYN POLYISOPRENE ANTI

## (undated) DEVICE — PREVENA INCISION MANAGEMENT SYSTEM- PEEL & PLACE DRESSING: Brand: PREVENA™ PEEL & PLACE™

## (undated) DEVICE — ELECTRODE ES AD CRDLSS PT RET REM POLYHESIVE

## (undated) DEVICE — COUNTER NDL 30 COUNT FOAM STRP SGL MAG

## (undated) DEVICE — SUTURE PERMA-HAND SZ 3-0 L18IN 17 STRND NONABSORBABLE BLK SA64H

## (undated) DEVICE — GOWN,SIRUS,POLYRNF,BRTHSLV,XLN/XL,20/CS: Brand: MEDLINE

## (undated) DEVICE — SUTURE VCRL SZ 2-0 L18IN ABSRB UD CT-1 L36MM 1/2 CIR J839D

## (undated) DEVICE — APPLICATOR MEDICATED 26 CC SOLUTION HI LT ORNG CHLORAPREP

## (undated) DEVICE — SUTURE PERMAHAND SZ 2-0 L17X18IN NONABSORBABLE BLK SILK SA65H

## (undated) DEVICE — SPONGE LAP W18XL18IN WHT COT 4 PLY FLD STRUNG RADPQ DISP ST

## (undated) DEVICE — SOLUTION IV IRRIG POUR BRL 0.9% SODIUM CHL 2F7124

## (undated) DEVICE — SUTURE VCRL SZ 3-0 L18IN ABSRB UD L26MM SH 1/2 CIR J864D

## (undated) DEVICE — MARKER SURG SKIN UTIL BLK REG TIP NONSMEARING W/ 6IN RUL

## (undated) DEVICE — DRAPE,ABDOMINAL,MAJOR,STERILE: Brand: MEDLINE

## (undated) DEVICE — CATHETERIZATION KIT 7 FRX20 CM 3L

## (undated) DEVICE — SUTURE PERMAHAND SZ 3-0 L30IN NONABSORBABLE BLK SH L26MM K832H

## (undated) DEVICE — YANKAUER,BULB TIP,W/O VENT,RIGID,STERILE: Brand: MEDLINE

## (undated) DEVICE — SUTURE PERMAHAND SZ 3-0 L18IN NONABSORBABLE BLK L26MM SH C013D

## (undated) DEVICE — SWAB CULT SGL AMIES W/O CHAR FOR THRT VAG SKIN HRT CULTSWAB

## (undated) DEVICE — GLOVE ORANGE PI 7 1/2   MSG9075

## (undated) DEVICE — GLOVE SURG SZ 6 CRM LTX FREE POLYISOPRENE POLYMER BEAD ANTI

## (undated) DEVICE — Device

## (undated) DEVICE — STAPLER INT L75MM CUT LN L73MM STPL LN L77MM BLU B FRM 8

## (undated) DEVICE — SUTURE STRATAFIX SPRL PDS + VLT 3-0 15CM DISPOSABLE/SNGLE SXPP1B420